# Patient Record
Sex: FEMALE | Race: WHITE | NOT HISPANIC OR LATINO | Employment: UNEMPLOYED | URBAN - METROPOLITAN AREA
[De-identification: names, ages, dates, MRNs, and addresses within clinical notes are randomized per-mention and may not be internally consistent; named-entity substitution may affect disease eponyms.]

---

## 2018-08-03 ENCOUNTER — HOSPITAL ENCOUNTER (EMERGENCY)
Facility: HOSPITAL | Age: 28
Discharge: HOME | End: 2018-08-03
Attending: EMERGENCY MEDICINE
Payer: COMMERCIAL

## 2018-08-03 VITALS
TEMPERATURE: 98 F | HEIGHT: 62 IN | WEIGHT: 123.44 LBS | BODY MASS INDEX: 22.71 KG/M2 | OXYGEN SATURATION: 99 % | SYSTOLIC BLOOD PRESSURE: 123 MMHG | RESPIRATION RATE: 18 BRPM | HEART RATE: 59 BPM | DIASTOLIC BLOOD PRESSURE: 56 MMHG

## 2018-08-03 DIAGNOSIS — O20.0 THREATENED ABORTION: ICD-10-CM

## 2018-08-03 DIAGNOSIS — O46.90 VAGINAL BLEEDING IN PREGNANCY, UNSPECIFIED TRIMESTER: Primary | ICD-10-CM

## 2018-08-03 DIAGNOSIS — N93.9 VAGINAL BLEEDING: ICD-10-CM

## 2018-08-03 LAB
ABO + RH BLD: NORMAL
ABO + RH BLD: NORMAL
ALBUMIN SERPL BCP-MCNC: 3.6 G/DL
ALP SERPL-CCNC: 45 U/L
ALT SERPL W/O P-5'-P-CCNC: 10 U/L
ANION GAP SERPL CALC-SCNC: 6 MMOL/L
AST SERPL-CCNC: 14 U/L
B-HCG UR QL: POSITIVE
BACTERIA GENITAL QL WET PREP: ABNORMAL
BASOPHILS # BLD AUTO: 0.03 K/UL
BASOPHILS NFR BLD: 0.3 %
BILIRUB SERPL-MCNC: 0.5 MG/DL
BILIRUB UR QL STRIP: NEGATIVE
BLD GP AB SCN CELLS X3 SERPL QL: NORMAL
BUN SERPL-MCNC: 3 MG/DL
CALCIUM SERPL-MCNC: 9 MG/DL
CHLORIDE SERPL-SCNC: 106 MMOL/L
CLARITY UR REFRACT.AUTO: CLEAR
CLUE CELLS VAG QL WET PREP: ABNORMAL
CO2 SERPL-SCNC: 23 MMOL/L
COLOR UR AUTO: YELLOW
CREAT SERPL-MCNC: 0.6 MG/DL
CTP QC/QA: YES
DIFFERENTIAL METHOD: ABNORMAL
EOSINOPHIL # BLD AUTO: 0.1 K/UL
EOSINOPHIL NFR BLD: 1.2 %
ERYTHROCYTE [DISTWIDTH] IN BLOOD BY AUTOMATED COUNT: 12.7 %
EST. GFR  (AFRICAN AMERICAN): >60 ML/MIN/1.73 M^2
EST. GFR  (NON AFRICAN AMERICAN): >60 ML/MIN/1.73 M^2
FILAMENT FUNGI VAG WET PREP-#/AREA: ABNORMAL
GLUCOSE SERPL-MCNC: 70 MG/DL
GLUCOSE UR QL STRIP: NEGATIVE
HCG INTACT+B SERPL-ACNC: NORMAL MIU/ML
HCT VFR BLD AUTO: 38 %
HGB BLD-MCNC: 13 G/DL
HGB UR QL STRIP: ABNORMAL
IMM GRANULOCYTES # BLD AUTO: 0.03 K/UL
IMM GRANULOCYTES NFR BLD AUTO: 0.3 %
INJECT RH IG VOL PATIENT: NORMAL ML
KETONES UR QL STRIP: NEGATIVE
LEUKOCYTE ESTERASE UR QL STRIP: NEGATIVE
LYMPHOCYTES # BLD AUTO: 2.3 K/UL
LYMPHOCYTES NFR BLD: 25.5 %
MCH RBC QN AUTO: 31.2 PG
MCHC RBC AUTO-ENTMCNC: 34.2 G/DL
MCV RBC AUTO: 91 FL
MICROSCOPIC COMMENT: NORMAL
MONOCYTES # BLD AUTO: 0.7 K/UL
MONOCYTES NFR BLD: 7.2 %
NEUTROPHILS # BLD AUTO: 6 K/UL
NEUTROPHILS NFR BLD: 65.5 %
NITRITE UR QL STRIP: NEGATIVE
NRBC BLD-RTO: 0 /100 WBC
PH UR STRIP: 7 [PH] (ref 5–8)
PLATELET # BLD AUTO: 264 K/UL
PMV BLD AUTO: 10.9 FL
POTASSIUM SERPL-SCNC: 3.6 MMOL/L
PROT SERPL-MCNC: 6.8 G/DL
PROT UR QL STRIP: NEGATIVE
RBC # BLD AUTO: 4.17 M/UL
RBC #/AREA URNS AUTO: 4 /HPF (ref 0–4)
RH BLD: NORMAL
SODIUM SERPL-SCNC: 135 MMOL/L
SP GR UR STRIP: 1.01 (ref 1–1.03)
SPECIMEN SOURCE: ABNORMAL
SQUAMOUS #/AREA URNS AUTO: 2 /HPF
T VAGINALIS GENITAL QL WET PREP: ABNORMAL
URN SPEC COLLECT METH UR: ABNORMAL
UROBILINOGEN UR STRIP-ACNC: NEGATIVE EU/DL
WBC # BLD AUTO: 9.17 K/UL
WBC #/AREA URNS AUTO: 1 /HPF (ref 0–5)
WBC #/AREA VAG WET PREP: ABNORMAL
YEAST GENITAL QL WET PREP: ABNORMAL

## 2018-08-03 PROCEDURE — 81025 URINE PREGNANCY TEST: CPT | Performed by: PHYSICIAN ASSISTANT

## 2018-08-03 PROCEDURE — 80053 COMPREHEN METABOLIC PANEL: CPT

## 2018-08-03 PROCEDURE — 86901 BLOOD TYPING SEROLOGIC RH(D): CPT | Mod: 91

## 2018-08-03 PROCEDURE — 87491 CHLMYD TRACH DNA AMP PROBE: CPT

## 2018-08-03 PROCEDURE — 84702 CHORIONIC GONADOTROPIN TEST: CPT

## 2018-08-03 PROCEDURE — 85025 COMPLETE CBC W/AUTO DIFF WBC: CPT

## 2018-08-03 PROCEDURE — 99284 EMERGENCY DEPT VISIT MOD MDM: CPT | Mod: ,,, | Performed by: PHYSICIAN ASSISTANT

## 2018-08-03 PROCEDURE — 86850 RBC ANTIBODY SCREEN: CPT

## 2018-08-03 PROCEDURE — 86900 BLOOD TYPING SEROLOGIC ABO: CPT | Mod: 91

## 2018-08-03 PROCEDURE — 81001 URINALYSIS AUTO W/SCOPE: CPT

## 2018-08-03 PROCEDURE — 87210 SMEAR WET MOUNT SALINE/INK: CPT

## 2018-08-03 PROCEDURE — 99284 EMERGENCY DEPT VISIT MOD MDM: CPT

## 2018-08-03 PROCEDURE — 63600519 RHOGAM PHARM REV CODE 636 ALT 250 W HCPCS: Performed by: PHYSICIAN ASSISTANT

## 2018-08-03 PROCEDURE — 25000003 PHARM REV CODE 250: Performed by: PHYSICIAN ASSISTANT

## 2018-08-03 RX ADMIN — SODIUM CHLORIDE 1000 ML: 0.9 INJECTION, SOLUTION INTRAVENOUS at 05:08

## 2018-08-03 RX ADMIN — HUMAN RHO(D) IMMUNE GLOBULIN 300 MCG: 300 INJECTION, SOLUTION INTRAMUSCULAR at 10:08

## 2018-08-03 NOTE — ED NOTES
LOC: The patient is awake and alert; oriented x 3 and speaking appropriately.  APPEARANCE: Patient resting comfortably, patient is clean and well groomed  SKIN: warm and dry, normal skin turgor & moist mucus membranes, skin intact, no breakdown noted.  MUSCULOSKELETAL: Patient moving all extremities well, no obvious swelling or deformities noted  RESPIRATORY: Airway is open and patent; respirations are spontaneous, normal effort and rate  CARDIAC: Patient has a normal rate, no peripheral edema noted, capillary refill < 3 seconds; No complaints of chest pain   ABDOMEN: Soft and C/O lower abd cramping, no distention noted. Bowel sounds present x 4

## 2018-08-03 NOTE — ED TRIAGE NOTES
Vaginal bleeding that began 3 days ago. Denies blood clots . C/o  Lower abdominal cramping.  Denies dysuria.  No fever. States she is 12 wks pregnant per US.

## 2018-08-03 NOTE — ED PROVIDER NOTES
Encounter Date: 8/3/2018       History     Chief Complaint   Patient presents with    Vaginal Bleeding     12 weeks pregnant, 2 days ago mod to heavy, today spotting     Patient is a 28-year-old   female presenting to the ER for evaluation of vaginal bleeding.  Patient is here with  who states that patient has had vaginal bleeding for the last 3 days.  She was changing about 2 pads per day.  It has improved in the last day.  Patient has had some abdominal cramping associated with this.  Denies flank pain.  Denies nausea, vomiting, diarrhea.  No blood in stool or black tarry stool.  She denies dysuria hematuria.  Patient does not have established care in the [place] and they moved here 10 days ago.  Last ultrasound was at 7 weeks pregnant [place].      The history is provided by the patient and the spouse. The history is limited by a language barrier. No  was used.     Review of patient's allergies indicates:  No Known Allergies  Past Medical History:   Diagnosis Date    Anemia      History reviewed. No pertinent surgical history.  History reviewed. No pertinent family history.  Social History   Substance Use Topics    Smoking status: Never Smoker    Smokeless tobacco: Never Used    Alcohol use No     Review of Systems   Constitutional: Negative for chills and fever.   HENT: Negative for congestion.    Respiratory: Negative for cough and shortness of breath.    Cardiovascular: Negative for chest pain and palpitations.   Gastrointestinal: Positive for abdominal pain. Negative for diarrhea, nausea and vomiting.   Genitourinary: Positive for vaginal bleeding. Negative for dysuria, flank pain and hematuria.   Musculoskeletal: Negative for back pain.   Skin: Negative for rash.   Allergic/Immunologic: Negative for immunocompromised state.   Neurological: Negative for dizziness and weakness.   Hematological: Does not bruise/bleed easily.   Psychiatric/Behavioral:  Negative for confusion.       Physical Exam     Initial Vitals [08/03/18 1517]   BP Pulse Resp Temp SpO2   (!) 112/59 64 18 98.4 °F (36.9 °C) 100 %      MAP       --         Physical Exam    Constitutional: She appears well-developed and well-nourished. She is not diaphoretic. No distress.   HENT:   Head: Normocephalic and atraumatic.   Eyes: Conjunctivae and EOM are normal.   Neck: Neck supple.   Cardiovascular: Normal rate, regular rhythm, normal heart sounds and intact distal pulses.   Pulmonary/Chest: Breath sounds normal.   Abdominal: Soft. Normal appearance. She exhibits no distension. There is no tenderness. There is no rigidity, no rebound, no guarding and no CVA tenderness.   Genitourinary: Cervix exhibits no motion tenderness. Right adnexum displays no mass, no tenderness and no fullness. Left adnexum displays no mass, no tenderness and no fullness. There is bleeding in the vagina.   Genitourinary Comments: Minimal vaginal bleeding, brownish in color.    Neurological: She is alert and oriented to person, place, and time.   Skin: Skin is warm and dry.         ED Course   Procedures  Labs Reviewed   CBC W/ AUTO DIFFERENTIAL - Abnormal; Notable for the following:        Result Value    MCH 31.2 (*)     All other components within normal limits   COMPREHENSIVE METABOLIC PANEL - Abnormal; Notable for the following:     Sodium 135 (*)     BUN, Bld 3 (*)     Alkaline Phosphatase 45 (*)     Anion Gap 6 (*)     All other components within normal limits   URINALYSIS, REFLEX TO URINE CULTURE - Abnormal; Notable for the following:     Occult Blood UA 1+ (*)     All other components within normal limits    Narrative:     Preferred Collection Type->Urine, Clean Catch   VAGINAL SCREEN - Abnormal; Notable for the following:     Bacteria - Vaginal Screen Few (*)     All other components within normal limits   POCT URINE PREGNANCY - Abnormal; Notable for the following:     POC Preg Test, Ur Positive (*)     All other  components within normal limits   C. TRACHOMATIS/N. GONORRHOEAE BY AMP DNA   HCG, QUANTITATIVE, PREGNANCY   URINALYSIS MICROSCOPIC    Narrative:     Preferred Collection Type->Urine, Clean Catch   GROUP & RH   RH TYPING   PREPARE RH IMMUNE GLOBULIN          Imaging Results    None                APC / Resident Notes:   Patient was seen in the ED promptly upon arrival.  She is afebrile, no acute distress.  Physical examination does reveal minimal vaginal bleeding, pinkish brown in the vaginal vault.  No blood clots noted. No CMT or adnexal tenderness on exam. Cervical os is closed.   Wet prep unremarkable.  Laboratory studies unremarkable. Beta HCG [id number].    Bedside ultrasound was preformed by Dr. Vergara. IUP noted. Fetal movement and heart rate appears within normal limits.     Patient is O negative. She did receive 1 dose of RhoGAM in ED.  I did provide patient with follow-up information for OB gyn.  Advised to call on Monday.  Advised to avoid NSAIDs; only take Tylenol if needed.  Given strict return precautions to the ED.  Stable for discharge. The care of this patient was overseen by attending physician who agrees with treatment, plan, and disposition.                   Clinical Impression:   The primary encounter diagnosis was Vaginal bleeding in pregnancy, unspecified trimester. Diagnoses of Vaginal bleeding and Threatened  were also pertinent to this visit.      Disposition:   Disposition: Discharged  Condition: Stable                        Sasha Herrera PA-C  18

## 2018-08-04 LAB
C TRACH DNA SPEC QL NAA+PROBE: NOT DETECTED
N GONORRHOEA DNA SPEC QL NAA+PROBE: NOT DETECTED

## 2018-08-09 ENCOUNTER — LAB VISIT (OUTPATIENT)
Dept: LAB | Facility: HOSPITAL | Age: 28
End: 2018-08-09
Attending: OBSTETRICS & GYNECOLOGY
Payer: COMMERCIAL

## 2018-08-09 ENCOUNTER — TELEPHONE (OUTPATIENT)
Dept: OBSTETRICS AND GYNECOLOGY | Facility: CLINIC | Age: 28
End: 2018-08-09

## 2018-08-09 ENCOUNTER — INITIAL PRENATAL (OUTPATIENT)
Dept: OBSTETRICS AND GYNECOLOGY | Facility: CLINIC | Age: 28
End: 2018-08-09
Payer: COMMERCIAL

## 2018-08-09 VITALS — BODY MASS INDEX: 21.77 KG/M2 | WEIGHT: 118.25 LBS | SYSTOLIC BLOOD PRESSURE: 98 MMHG | DIASTOLIC BLOOD PRESSURE: 58 MMHG

## 2018-08-09 DIAGNOSIS — Z34.01 ENCOUNTER FOR SUPERVISION OF NORMAL FIRST PREGNANCY IN FIRST TRIMESTER: ICD-10-CM

## 2018-08-09 DIAGNOSIS — Z84.3 CONSANGUINITY: Primary | ICD-10-CM

## 2018-08-09 PROBLEM — Z34.00 PREGNANCY, SUPERVISION, NORMAL, FIRST: Status: ACTIVE | Noted: 2018-08-09

## 2018-08-09 PROCEDURE — 0500F INITIAL PRENATAL CARE VISIT: CPT | Mod: S$GLB,,, | Performed by: OBSTETRICS & GYNECOLOGY

## 2018-08-09 PROCEDURE — 87086 URINE CULTURE/COLONY COUNT: CPT

## 2018-08-09 PROCEDURE — 87491 CHLMYD TRACH DNA AMP PROBE: CPT

## 2018-08-09 PROCEDURE — 88175 CYTOPATH C/V AUTO FLUID REDO: CPT

## 2018-08-09 NOTE — TELEPHONE ENCOUNTER
----- Message from Trini Srivastava sent at 8/9/2018  3:36 PM CDT -----  Contact: diane  Name of Who is Calling: [facility]      What is the request in detail: Diane is requesting a call back to discuss this medication PNV,calcium 72-iron-folic acid 27 mg iron- 1 mg Tab      Can the clinic reply by [facility]: no      What Number to Call Back if not in [facility]: [phone]

## 2018-08-09 NOTE — TELEPHONE ENCOUNTER
Called pharmacy and informed them that any prenatal vitamin is fine as long as it has folic acid and dha.

## 2018-08-09 NOTE — PROGRESS NOTES
New OB    SUBJECTIVE:     Chief Complaint: Initial Prenatal Visit       History of Present Illness:  Pt is a 28 y.o. female who presents with her first pregnancy.  Dates unknown.  She does not vaginal bleeding/spotting, does have slight abdominal cramping, but no abdominal pain,  does not have nausea, does not have vomiting.     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 No pertinent surgical history.  OB History      Para Term  AB Living    1              SAB TAB Ectopic Multiple Live Births                     History reviewed. No pertinent family history.  Social History   Substance Use Topics    Smoking status: Never Smoker    Smokeless tobacco: Never Used    Alcohol use No       Current Outpatient Prescriptions   Medication Sig    FOLIC ACID, BULK, MISC by Misc.(Non-Drug; Combo Route) route.    PNV,calcium 72-iron-folic acid 27 mg iron- 1 mg Tab Take 1 tablet (1 each total) by mouth once daily.     No current facility-administered medications for this visit.        Review of Systems:  Review of Systems   Constitutional: Negative for fever and unexpected weight change.   Respiratory: Negative for shortness of breath.    Cardiovascular: Negative for chest pain.   Gastrointestinal: Negative for constipation, diarrhea, nausea and vomiting.   Genitourinary: Positive for pelvic pain. Negative for dysuria, frequency, menstrual problem, urgency, vaginal bleeding, vaginal discharge and vaginal pain.   Psychiatric/Behavioral: The patient is not nervous/anxious.         OBJECTIVE:     Physical Exam:  Physical Exam   Constitutional: She is oriented to person, place, and time. She appears well-developed and well-nourished.   Neck: Normal range of motion. Neck supple. No tracheal deviation  present. No thyromegaly present.   Cardiovascular: Normal rate, regular rhythm and normal heart sounds.    Pulmonary/Chest: Effort normal and breath sounds normal. Right breast exhibits no inverted nipple, no mass, no nipple discharge, no skin change and no tenderness. Left breast exhibits no inverted nipple, no mass, no nipple discharge, no skin change and no tenderness. Breasts are symmetrical.   Abdominal: Soft.   Genitourinary: Vagina normal and uterus normal. No labial fusion. There is no rash, tenderness, lesion or injury on the right labia. There is no rash, tenderness, lesion or injury on the left labia. Uterus is not deviated, not enlarged, not fixed and not tender. Cervix exhibits no motion tenderness, no discharge and no friability. Right adnexum displays no mass, no tenderness and no fullness. Left adnexum displays no mass, no tenderness and no fullness. No erythema, tenderness or bleeding in the vagina. No foreign body in the vagina. No signs of injury around the vagina. No vaginal discharge found.   Genitourinary Comments: Urethra: normal appearing urethra with no masses, tenderness or lesions  Urethral meatus: normal size, anterior vaginal wall with no prolapse, no lesions   Neurological: She is alert and oriented to person, place, and time.   Psychiatric: She has a normal mood and affect. Her behavior is normal. Judgment and thought content normal.   Nursing note and vitals reviewed.      Chaperoned by: Lyudmila    ASSESSMENT:       ICD-10-CM ICD-9-CM    1. 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 Encounter for supervision of normal first pregnancy in first trimester Z34.01 V22.0 Hemoglobin Electrophoresis,Hgb A2 Teja.      HIV-1 and HIV-2 antibodies      RPR      Hepatitis B surface antigen      Rubella antibody, IgG      Urine culture      C. trachomatis/N. gonorrhoeae by AMP DNA      Liquid-based pap smear, screening      US MFM Procedure ([facility])       PNV,calcium 72-iron-folic acid 27 mg iron- 1 mg Tab          Plan:      Ximena was seen today for initial prenatal visit.    Diagnoses and all orders for this visit:    Consanguinity  -     Ambulatory consult to Maternal Fetal Medicine    Encounter for supervision of normal first pregnancy in first trimester  -     Hemoglobin Electrophoresis,Hgb A2 Teja.; Future  -     HIV-1 and HIV-2 antibodies; Future  -     RPR; Future  -     Hepatitis B surface antigen; Future  -     Rubella antibody, IgG; Future  -     Urine culture  -     C. trachomatis/N. gonorrhoeae by AMP DNA  -     Liquid-based pap smear, screening  -     US MFM Procedure ([facility]); Future  -     PNV,calcium 72-iron-folic acid 27 mg iron- 1 mg Tab; Take 1 tablet (1 each total) by mouth once daily.        Orders Placed This Encounter   Procedures    Urine culture    C. trachomatis/N. gonorrhoeae by AMP DNA    Hemoglobin Electrophoresis,Hgb A2 Teja.    HIV-1 and HIV-2 antibodies    RPR    Hepatitis B surface antigen    Rubella antibody, IgG    Ambulatory consult to Maternal Fetal Medicine    US MFM Procedure ([facility])       Positive UPT:   - UPT: positive in the clinic  - Dating U/S: ordered today- with MFM  - She does not want 1st trimester screening with MFM; but does want quad screen in the second trimester  - Recommended Prenatal vitamins with with at least 400 mcg of folic acid  - First trimester labs: ordered; declined CF  - referral to MFM for consanguineous relationship      Counseling: Patient was counseled today on routine 1st trimester precautions, including vaginal bleeding and abdominal pain. We also discussed proper weight gain based on the [facility] [employer] [facility]'s recommendations based on her pre-pregnancy weight, foods to avoid in pregnancy (i.e. sushi, fish that are high in mercury, cold deli meat, and unpasteurized cheeses), environmental precautions such as cat litter, and prenatal vitamin options (i.e. stool softener, DHA).   Patient given A to Z handbook of pregnancy.    Follow-up in about 1 month (around 9/9/2018) for ob check.    Diana Rey

## 2018-08-10 ENCOUNTER — APPOINTMENT (OUTPATIENT)
Dept: LAB | Facility: HOSPITAL | Age: 28
End: 2018-08-10
Attending: OBSTETRICS & GYNECOLOGY
Payer: COMMERCIAL

## 2018-08-10 LAB
C TRACH DNA SPEC QL NAA+PROBE: NOT DETECTED
HBV SURFACE AG SERPL QL IA: NEGATIVE
HIV 1+2 AB+HIV1 P24 AG SERPL QL IA: NEGATIVE
N GONORRHOEA DNA SPEC QL NAA+PROBE: NOT DETECTED

## 2018-08-10 PROCEDURE — 86592 SYPHILIS TEST NON-TREP QUAL: CPT

## 2018-08-10 PROCEDURE — 83021 HEMOGLOBIN CHROMOTOGRAPHY: CPT

## 2018-08-10 PROCEDURE — 36415 COLL VENOUS BLD VENIPUNCTURE: CPT | Mod: PO

## 2018-08-10 PROCEDURE — 87340 HEPATITIS B SURFACE AG IA: CPT

## 2018-08-10 PROCEDURE — 86762 RUBELLA ANTIBODY: CPT

## 2018-08-10 PROCEDURE — 86703 HIV-1/HIV-2 1 RESULT ANTBDY: CPT

## 2018-08-11 LAB — RPR SER QL: NORMAL

## 2018-08-12 LAB
BACTERIA UR CULT: NORMAL
BACTERIA UR CULT: NORMAL

## 2018-08-13 LAB
RUBV IGG SER-ACNC: 57.6 IU/ML
RUBV IGG SER-IMP: REACTIVE

## 2018-08-14 LAB
HGB A2 MFR BLD HPLC: 2.8 %
HGB FRACT BLD ELPH-IMP: NORMAL
HGB FRACT BLD ELPH-IMP: NORMAL

## 2018-08-21 ENCOUNTER — INITIAL CONSULT (OUTPATIENT)
Dept: MATERNAL FETAL MEDICINE | Facility: CLINIC | Age: 28
End: 2018-08-21
Payer: COMMERCIAL

## 2018-08-21 ENCOUNTER — OFFICE VISIT (OUTPATIENT)
Dept: MATERNAL FETAL MEDICINE | Facility: CLINIC | Age: 28
End: 2018-08-21
Payer: COMMERCIAL

## 2018-08-21 VITALS — DIASTOLIC BLOOD PRESSURE: 68 MMHG | BODY MASS INDEX: 20.85 KG/M2 | WEIGHT: 113.31 LBS | SYSTOLIC BLOOD PRESSURE: 98 MMHG

## 2018-08-21 DIAGNOSIS — Z84.3 CONSANGUINITY: ICD-10-CM

## 2018-08-21 DIAGNOSIS — Z34.01 ENCOUNTER FOR SUPERVISION OF NORMAL FIRST PREGNANCY IN FIRST TRIMESTER: ICD-10-CM

## 2018-08-21 DIAGNOSIS — O35.9XX0 FETAL ABNORMALITY AFFECTING MANAGEMENT OF MOTHER, SINGLE OR UNSPECIFIED FETUS: ICD-10-CM

## 2018-08-21 DIAGNOSIS — Z36.89 ENCOUNTER FOR FETAL ANATOMIC SURVEY: Primary | ICD-10-CM

## 2018-08-21 PROCEDURE — 99999 PR PBB SHADOW E&M-EST. PATIENT-LVL II: CPT | Mod: PBBFAC,,, | Performed by: OBSTETRICS & GYNECOLOGY

## 2018-08-21 PROCEDURE — 76811 OB US DETAILED SNGL FETUS: CPT | Mod: S$GLB,,, | Performed by: OBSTETRICS & GYNECOLOGY

## 2018-08-21 PROCEDURE — 99205 OFFICE O/P NEW HI 60 MIN: CPT | Mod: 25,S$GLB,, | Performed by: OBSTETRICS & GYNECOLOGY

## 2018-08-21 PROCEDURE — 3008F BODY MASS INDEX DOCD: CPT | Mod: CPTII,S$GLB,, | Performed by: OBSTETRICS & GYNECOLOGY

## 2018-08-21 NOTE — PROGRESS NOTES
"OB Ultrasound Report (see PDF version under imaging tab) and consultation    Indication  ========    Consultation- consanguinity . Estimation of gestational age.    Method  ======    Transabdominal ultrasound examination, [place] E10. View: Sufficient.    Pregnancy  =========    [place] pregnancy. Number of fetuses: 1.    Dating  ======    GA by "stated dating" 15 w + 3 d  YASMIN by "stated dating": 2/9/2019  Ultrasound examination on: 8/21/2018  GA by U/S based upon: AC, BPD, Femur, HC  GA by U/S 16 w + 2 d  YASMIN by U/S: 2/3/2019  Assigned: Dating performed on 08/21/2018, based on the external assessment  Assigned GA 15 w + 3 d  Assigned YASMIN: 2/9/2019    General Evaluation  ===============    Cardiac activity: present.  bpm.  Fetal movements: visualized.  Placenta:  Placental site: right lateral.  Umbilical cord: 2 vessel cord.  Amniotic fluid: normal amount.    Fetal Biometry  ===========    Fetal Biometry  BPD 34.7 mm 16w 5d Hadlock  OFD 42.1 mm 16w 4d Dhara  .8 mm 16w 1d Hadlock  .7 mm 16w 4d Hadlock  Femur 18.9 mm 15w 4d Hadlock   g  Calculated by: Hadlock (BPD-HC-AC-FL)  EFW (lb) 0 lb  EFW (oz) 5 oz  Cephalic index 0.82  HC / AC 1.15  FL / BPD 0.54  FL / AC 0.18   bpm    Fetal Anatomy  ===========    Cranium: appears normal  Lateral ventricles: details  Choroid plexus: normal  Midline falx: normal  Cerebellum: visualized  Cisterna magna: normal  Rt lateral ventricle: ventriculomegaly  Lt lateral ventricle: ventriculomegaly  Third ventricle: dilated  Stomach: visualized  Bladder: visualized  Arms: both visualized  Legs: both visualized    Maternal Structures  ===============    Ovaries / Tubes / Adnexa  Rt ovary: Visualized  Rt ovary D1 4.0 cm  Rt ovary D2 3.3 cm  Rt ovary D3 2.1 cm  Rt ovary mean 3.1 cm  Rt ovary vol 14.0 cm³  Lt ovary: Not visualized    Consultation  ==========    Type: Abnormal Ultrasound Finding and Parental consanguinity.  I spent 60 minutes on the " consultation today with the patient and her spouse regarding their family histories and regarding findings  from today's ultrasound exam. More than 50% of the consultation was spent in face-to-face counseling and coordination of care.    The patient is a 29 y/o G1 who is currently 15 and 3/7 weeks pregnant based on a prior scan performed in [facility] (YASMIN of  2/9/19). She presents with her  today due to concerns regarding consanguinity. Ximena and her  are first cousins (his  mother and her father are siblings). They report 4 relatives with congenital heart disease (CHD). One child (Wendi brothers son, who  is the product of a consanguineous mating also) has a congenital pulmonary valve problem that has been corrected by one surgical  procedure. He is 6 years old and reportedly has no other congenital birth defects or developmental delay. The other 3 individuals with  CHD are the offspring of 2 of Wendi fathers brothers. One brother had two daughters affected by CHD, and another brother has one  son affected by CHD. The specific types of CHD for these individuals is unknown. These individuals are otherwise reportedly normal.  To the best of their knowledge, no other individuals in the families are affected by congenital birth defects or known genetic disease.  Both Ximena and her  are in good health and deny any chronic medical illnesses or medication use. Other than 2 episodes of  vaginal bleeding, which have resolved, Wendi pregnancy has been uncomplicated thus far.  We reviewed the concerns associated with consanguinity in general and discussed the particular risks based on the known family  history of CHD. The primary genetic concerns include an increased risk for autosomal recessive disorders, due to the increased  number of genes shared in consanguineous couples, and an increased risk (approximately 2X increased over the general population  risk) for congenital anomalies or  developmental delay. Ximena and her  have been screened for thalassemia (uncertain whether  beta thalassemia, alpha thalassemia, or both) prior to their marriage, and neither was found to be a carrier. They have not undergone  any other screening for genetic disease. We reviewed the option of expanded carrier screening. At this point, they are not interested  in pursuing expanded carrier screening. Based on the family history of CHD, they are at increased risk to have a child with CHD, and  fetal echo and consultation with Pediatric Cardiology is recommended.  On todays ultrasound examination, while limited due to early gestation, bilateral cerebral lateral ventriculomegaly and dilation of the  third ventricle is suspected. Therefore, we reviewed the association between this finding and fetal chromosomal/genetic abnormalities  and fetal congenital infection. Amniocentesis, as the means of making a definitive cytogenetic diagnosis and as a means of testing for  some congenital infections, was discussed. At this point, Ximena and her  are deferring a decision about amniocentesis. Will  plan for a f/u ultrasound exam in 3 weeks to reassess the cerebral ventricles and to better delineate the rest of the fetal anatomy. At  that time, will readdress amniocentesis and referral to Pediatric Cardiology for a fetal echo.    Impression  =========    Fetal size is appropriate for established gestational age. The fetal anatomic survey is limited due to early gestational age. However,  lateral cerebral ventriculomegaly and dilation of the third ventricle is suspected. The remainder of the intracranial anatomy is grossly  normal. No other fetal structural malformations are seen, but some of the fetal anatomy cannot be adequately assessed today.  AFV is normal.

## 2018-08-21 NOTE — LETTER
August 21, 2018      Diana Rey MD  [address] [address]           [facility]  [address]  [facility] [address]  Phone: [phone]          Patient: Ximena Robbins   MR Number: [id number]   YOB: 1990   Date of Visit: 8/21/2018       Dear Dr. Diana Rey:    Thank you for referring Ximena Robbins to me for evaluation. Attached you will find relevant portions of my assessment and plan of care.    If you have questions, please do not hesitate to call me. I look forward to following Ximena Robbins along with you.    Sincerely,    Peri Watson MD    Enclosure  CC:  No Recipients    If you would like to receive this communication electronically, please contact [email or web address] or [phone] to request more information on [employer] access.    For providers and/or their staff who would like to refer a patient to [facility], please contact us through our one-stop-shop provider referral line, [facility], at [phone].    If you feel you have received this communication in error or would no longer like to receive these types of communications, please e-mail [email or web address]

## 2018-09-04 ENCOUNTER — PATIENT MESSAGE (OUTPATIENT)
Dept: OBSTETRICS AND GYNECOLOGY | Facility: CLINIC | Age: 28
End: 2018-09-04

## 2018-09-12 ENCOUNTER — PROCEDURE VISIT (OUTPATIENT)
Dept: MATERNAL FETAL MEDICINE | Facility: CLINIC | Age: 28
End: 2018-09-12
Payer: COMMERCIAL

## 2018-09-12 ENCOUNTER — INITIAL CONSULT (OUTPATIENT)
Dept: MATERNAL FETAL MEDICINE | Facility: CLINIC | Age: 28
End: 2018-09-12
Payer: COMMERCIAL

## 2018-09-12 VITALS — WEIGHT: 116.19 LBS | SYSTOLIC BLOOD PRESSURE: 90 MMHG | BODY MASS INDEX: 21.38 KG/M2 | DIASTOLIC BLOOD PRESSURE: 54 MMHG

## 2018-09-12 DIAGNOSIS — O35.00X0 CENTRAL NERVOUS SYSTEM MALFORMATION IN FETUS AFFECTING OBSTETRICAL CARE, SINGLE OR UNSPECIFIED FETUS: ICD-10-CM

## 2018-09-12 DIAGNOSIS — O35.9XX0 FETAL ABNORMALITY AFFECTING MANAGEMENT OF MOTHER, SINGLE OR UNSPECIFIED FETUS: ICD-10-CM

## 2018-09-12 DIAGNOSIS — Z84.3 CONSANGUINITY: ICD-10-CM

## 2018-09-12 DIAGNOSIS — Z36.89 ENCOUNTER FOR FETAL ANATOMIC SURVEY: ICD-10-CM

## 2018-09-12 DIAGNOSIS — O35.9XX0 FETAL ABNORMALITY AFFECTING MANAGEMENT OF MOTHER, SINGLE OR UNSPECIFIED FETUS: Primary | ICD-10-CM

## 2018-09-12 DIAGNOSIS — O35.BXX0 PREGNANCY COMPLICATED BY FETAL CONGENITAL HEART DISEASE, SINGLE OR UNSPECIFIED FETUS: ICD-10-CM

## 2018-09-12 PROCEDURE — 99215 OFFICE O/P EST HI 40 MIN: CPT | Mod: 25,S$GLB,, | Performed by: OBSTETRICS & GYNECOLOGY

## 2018-09-12 PROCEDURE — 76811 OB US DETAILED SNGL FETUS: CPT | Mod: S$GLB,,, | Performed by: OBSTETRICS & GYNECOLOGY

## 2018-09-12 PROCEDURE — 3008F BODY MASS INDEX DOCD: CPT | Mod: CPTII,S$GLB,, | Performed by: OBSTETRICS & GYNECOLOGY

## 2018-09-12 PROCEDURE — 99999 PR PBB SHADOW E&M-EST. PATIENT-LVL II: CPT | Mod: PBBFAC,,, | Performed by: OBSTETRICS & GYNECOLOGY

## 2018-09-13 ENCOUNTER — TELEPHONE (OUTPATIENT)
Dept: MATERNAL FETAL MEDICINE | Facility: CLINIC | Age: 28
End: 2018-09-13

## 2018-09-13 NOTE — PROGRESS NOTES
"OB Ultrasound Report (see PDF version under imaging tab) and f/u consultation    Indication  ========    Fetal anatomy survey - dilated lateral ventricles and third ventricle seen on last exam.    Method  ======    [place] E10, Transabdominal ultrasound examination. View: Suboptimal view: limited by fetal position.    Pregnancy  =========    [place] pregnancy. Number of fetuses: 1.    Dating  ======    Cycle: regular cycle  GA by "stated dating" 18 w + 4 d  YASMIN by "stated dating": 2/9/2019  Ultrasound examination on: 9/12/2018  GA by U/S based upon: AC, BPD, Femur, HC  GA by U/S 18 w + 6 d  YASMIN by U/S: 2/7/2019  Assigned: Dating performed on 08/21/2018, based on the external assessment  Assigned GA 18 w + 4 d  Assigned YASMIN: 2/9/2019    General Evaluation  ===============    Cardiac activity: present.  bpm.  Fetal movements: visualized.  Presentation: transverse.  Placenta:  Placental site: anterior.  Umbilical cord: Cord vessels: 2 vessel cord. Cord insertion: placental insertion: normal.  Amniotic fluid: normal amount.    Fetal Biometry  ===========    Fetal Biometry  BPD 43.6 mm 19w 1d Hadlock  OFD 56.0 mm 19w 5d Dhara  .4 mm 19w 0d Hadlock  .8 mm 18w 6d Hadlock  Femur 27.1 mm 18w 2d Hadlock  Cerebellum tr 18.2 mm 18w 4d Reed  CM 4.6 mm  Nuchal fold 4.79 mm  Humerus 28.4 mm 19w 1d Dhara   g  Calculated by: Hadlock (BPD-HC-AC-FL)  EFW (lb) 0 lb  EFW (oz) 9 oz  Cephalic index 0.78  HC / AC 1.21  FL / BPD 0.62  FL / AC 0.20   bpm    Fetal Anatomy  ===========    Cranium: normal  Lateral ventricles: abnormal  Choroid plexus: normal  Midline falx: normal  Cerebellum: normal  Cisterna magna: normal  Head shape: normal  Rt lateral ventricle: abnormal  Rt lateral ventricle: 11 - 12mm  Lt lateral ventricle: abnormal  Lt lateral ventricle: 11 - 12mm  Cavum septi pellucidi: visualized, small  Parenchyma: normal  Third ventricle: abnormal  Third ventricle: dilated - 3.4 " mm  Neck: appears normal  Nuchal fold: normal  Lips: normal  Profile: normal  Nose: normal  Maxilla: normal  Mandible: normal  RVOT: normal  LVOT: suboptimal  4-chamber view: normal 4 chamber with prominent/dilated coronary sinus  Situs: normal  Aortic arch: suboptimal  Ductal arch: suboptimal  SVC: suboptimal  IVC: suboptimal  3-vessel view: suboptimal  3-vessel - trachea view: suspected right aortic arch  Cardiac position: normal  Cardiac axis: normal  Cardiac size: normal  Cardiac rhythm: normal  Rt lung: normal  Lt lung: normal  Diaphragm: normal  Cord insertion: normal  Stomach: normal  Kidneys: normal  Bladder: normal  Genitals: normal  Abdom. wall: appears normal  Abdom. cavity: normal  Rt kidney: normal  Lt kidney: normal  Liver: normal  Bowel: normal  Rt renal artery: normal  Lt renal artery: normal  Cervical spine: normal  Thoracic spine: normal  Lumbar spine: normal  Sacral spine: normal  Arms: normal  Legs: normal  Rt arm: normal  Lt arm: normal  Rt hand: present, hand closed  Lt hand: present, hand closed  Rt leg: normal  Lt leg: normal  Rt foot: normal  Lt foot: normal  Position of hands: suboptimal  Position of feet: normal  Gender: male  Wants to know gender: yes    Maternal Structures  ===============    Uterus / Cervix  Uterus: Normal  Cervical length 34.4 mm  Ovaries / Tubes / Adnexa  Rt ovary: Visualized  Lt ovary: Not visualized  Other: Uterus and adnexa normal    Consultation  ==========    Type: Abnormal Ultrasound Finding.  I spent 60 minutes on the consultation today with the patient and her spouse regarding findings from today's ultrasound exam. More  than 50% of the consultation was spent in face-to-face counseling and coordination of care.  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 Possible causes of aqueductal stenosis, including X-linked  aqueductal stenosis, infection, hemorrhage, and other genetic disorders, were reviewed. Options for testing during  pregnancy,  including amniocentesis, serial ultrasound exams, fetal MRI, serum screening tests for common fetal aneuploidies, and maternal  serologies for congenital infection, were also reviewed. The limitations of all testing available prenatally in arriving at an etiology for the  CNS findings were also reviewed. Similarly, the inability to predict with certainty the presence and degree of neurologic and  neurodevelopmental abnormalities associated with the CNS findings was discussed. The concurrent finding of likely fetal  cardiovascular malformation and consanguinity of the couple makes a genetic etiology a more likely explanation for the aqueductal  stenosis. We reviewed the importance of serial ultrasound examinations to assess for progression of ventriculomegaly and the  importance of better delineation of fetal cardiac anatomy. We also reviewed options regarding pregnancy continuation/termination,  specifically with regard to legal limitations in terms of gestational age.  At the end of our discussion, the couple decided to proceed with a quad screen and maternal serologies for congenital infection. Will  send CMV and toxoplasmosis IgG/IgM. She has had rubella and syphilis testing recently and is rubella immune and negative for  syphilis.  At this point, they are uncertain regarding amniocentesis and continuation of pregnancy.    Impression  =========    The following abnormal findings are seen on today's exam:  1. Bilateral, symmetrical lateral cerebral ventriculomegaly (11 - 12 mm at the atrial level)  2. Dilation of the third ventricle (~ 3 mm)  3. Prominent coronary sinus  4. 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 Possible narrowing of the aortic outflow with malalignment VSD    The remainder of the visualized fetal anatomy is normal; however, not all structures can be adequately visualized due to fetal position  and early gestational age. The cerebellum and posterior fossa are normal in appearance, and the 4th  ventricle is not dilated. There is  no evidence of cerebral ventricular wall irregularity, intraventricular or intraparenchymal hemorrhage, intracranial calcification, or  mass lesion in the brain. Today's CNS findings are most consistent with a diagnosis of aqueductal stenosis. Because the fetus is male,  X-linked aqueductal stenosis is in the differential diagnosis. Also in the differential diagnosis is aqueductal stenosis secondary to  infection, hemorrhage, or mass lesion. The fetal thumbs do not appear to be persistently adducted. While somewhat reassuring in  terms of concern for X-linked aqueductal stenosis, only ~50% of fetuses with aqueductal stenosis will have adducted thumbs.  The cardiac findings are concerning for an aortic arch abnormality and possible LVOT narrowing and malalignment VSD.

## 2018-09-13 NOTE — TELEPHONE ENCOUNTER
Left message for patient and  that orders for blood work labs have been entered and the patient can get them drawn at her earliest convenience.

## 2018-09-18 ENCOUNTER — LAB VISIT (OUTPATIENT)
Dept: LAB | Facility: OTHER | Age: 28
End: 2018-09-18
Payer: COMMERCIAL

## 2018-09-18 DIAGNOSIS — O35.9XX0 FETAL ABNORMALITY AFFECTING MANAGEMENT OF MOTHER, SINGLE OR UNSPECIFIED FETUS: ICD-10-CM

## 2018-09-18 PROCEDURE — 81511 FTL CGEN ABNOR FOUR ANAL: CPT

## 2018-09-18 PROCEDURE — 86644 CMV ANTIBODY: CPT

## 2018-09-18 PROCEDURE — 86645 CMV ANTIBODY IGM: CPT

## 2018-09-18 PROCEDURE — 86777 TOXOPLASMA ANTIBODY: CPT

## 2018-09-18 PROCEDURE — 86778 TOXOPLASMA ANTIBODY IGM: CPT

## 2018-09-20 LAB
# FETUSES US: ABNORMAL
2ND TRIMESTER 4 SCREEN PNL SERPL: POSITIVE
2ND TRIMESTER 4 SCREEN SERPL-IMP: ABNORMAL
AFP MOM SERPL: 2.27
AFP SERPL-MCNC: 129.2 NG/ML
AGE AT DELIVERY: 28
B-HCG MOM SERPL: 1.24
B-HCG SERPL-ACNC: 30.7 IU/ML
CMV IGM TITR SERPL: <8 U/ML
FET TS 21 RISK FROM MAT AGE: ABNORMAL
GA (DAYS): 3 D
GA (WEEKS): 19 WK
GA METHOD: ABNORMAL
IDDM PATIENT QL: ABNORMAL
INHIBIN A MOM SERPL: 1.56
INHIBIN A SERPL-MCNC: 333.8 PG/ML
SMOKING STATUS FTND: NO
T GONDII IGM SER-ACNC: <3 AU/ML
TS 18 RISK FETUS: ABNORMAL
TS 21 RISK FETUS: ABNORMAL
U ESTRIOL MOM SERPL: 0.99
U ESTRIOL SERPL-MCNC: 1.79 NG/ML

## 2018-09-24 ENCOUNTER — PATIENT MESSAGE (OUTPATIENT)
Dept: MATERNAL FETAL MEDICINE | Facility: CLINIC | Age: 28
End: 2018-09-24

## 2018-09-25 LAB — CMV IGG SERPL QL IA: REACTIVE

## 2018-09-26 LAB
T GONDII IGG SER QL IA: NORMAL
T GONDII IGG SERPL IA-ACNC: <5 IU/ML

## 2018-09-27 ENCOUNTER — TELEPHONE (OUTPATIENT)
Dept: MATERNAL FETAL MEDICINE | Facility: CLINIC | Age: 28
End: 2018-09-27

## 2018-09-27 NOTE — TELEPHONE ENCOUNTER
Pt  notified that MRI has been scheduled per Dr Watson on Wednesday, October 3 at 4:30 pm. Pt's  verbalized understanding of information. Pt portal message sent to the pt as well.

## 2018-09-28 ENCOUNTER — TELEPHONE (OUTPATIENT)
Dept: MATERNAL FETAL MEDICINE | Facility: CLINIC | Age: 28
End: 2018-09-28

## 2018-09-28 ENCOUNTER — TELEPHONE (OUTPATIENT)
Dept: OBSTETRICS AND GYNECOLOGY | Facility: CLINIC | Age: 28
End: 2018-09-28

## 2018-09-28 DIAGNOSIS — Z01.89 ENCOUNTER FOR LABORATORY TEST: Primary | ICD-10-CM

## 2018-09-28 NOTE — TELEPHONE ENCOUNTER
"Pt's  notified that Dr Watson would like to order one more lab test --CMV IgG avidity. Informed that per Dr Watson's message " her CMV test results likely represent a distant past infection, but the avidity testing will help confirm that". It is schedule before MRI on 10/3/18 at 3:45 pm. Pt verbalized understanding of information and appointment letter placed in mail for pt and  to review.         "

## 2018-10-03 ENCOUNTER — HOSPITAL ENCOUNTER (OUTPATIENT)
Dept: RADIOLOGY | Facility: OTHER | Age: 28
Discharge: HOME | End: 2018-10-03
Payer: COMMERCIAL

## 2018-10-03 PROCEDURE — 74712 MRI FETAL SNGL/1ST GESTATION: CPT | Mod: TC

## 2018-10-03 PROCEDURE — 74712 MRI FETAL SNGL/1ST GESTATION: CPT | Mod: 26,,, | Performed by: RADIOLOGY

## 2018-10-04 ENCOUNTER — LAB VISIT (OUTPATIENT)
Dept: LAB | Facility: OTHER | Age: 28
End: 2018-10-04
Payer: COMMERCIAL

## 2018-10-04 DIAGNOSIS — O35.9XX0 FETAL ABNORMALITY AFFECTING MANAGEMENT OF MOTHER, SINGLE OR UNSPECIFIED FETUS: Primary | ICD-10-CM

## 2018-10-04 DIAGNOSIS — O35.9XX0 FETAL ABNORMALITY AFFECTING MANAGEMENT OF MOTHER, SINGLE OR UNSPECIFIED FETUS: ICD-10-CM

## 2018-10-04 PROCEDURE — 86644 CMV ANTIBODY: CPT

## 2018-10-04 PROCEDURE — 30000890 MAYO MISCELLANEOUS TEST (REFLEX)

## 2018-10-10 LAB — MAYO MISCELLANEOUS RESULT (REF): NORMAL

## 2018-10-15 ENCOUNTER — PATIENT MESSAGE (OUTPATIENT)
Dept: MATERNAL FETAL MEDICINE | Facility: CLINIC | Age: 28
End: 2018-10-15

## 2018-10-15 NOTE — TELEPHONE ENCOUNTER
Mrs. Robbins,  Please call me at your convenience to discuss the results from your lab work and fetal MRI.  [phone].  Dr. Watson

## 2018-10-16 ENCOUNTER — TELEPHONE (OUTPATIENT)
Dept: MATERNAL FETAL MEDICINE | Facility: CLINIC | Age: 28
End: 2018-10-16

## 2018-10-16 DIAGNOSIS — O35.9XX0 FETAL ABNORMALITY AFFECTING MANAGEMENT OF MOTHER, SINGLE OR UNSPECIFIED FETUS: Primary | ICD-10-CM

## 2018-10-16 NOTE — TELEPHONE ENCOUNTER
Spoke with patient's  and tried to set up a follow up appointment and he told me that he would call us back next week to schedule.

## 2018-10-30 ENCOUNTER — PROCEDURE VISIT (OUTPATIENT)
Dept: MATERNAL FETAL MEDICINE | Facility: CLINIC | Age: 28
End: 2018-10-30
Payer: COMMERCIAL

## 2018-10-30 ENCOUNTER — TELEPHONE (OUTPATIENT)
Dept: MATERNAL FETAL MEDICINE | Facility: CLINIC | Age: 28
End: 2018-10-30

## 2018-10-30 ENCOUNTER — INITIAL CONSULT (OUTPATIENT)
Dept: MATERNAL FETAL MEDICINE | Facility: CLINIC | Age: 28
End: 2018-10-30
Payer: COMMERCIAL

## 2018-10-30 ENCOUNTER — TELEPHONE (OUTPATIENT)
Dept: PEDIATRIC CARDIOLOGY | Facility: CLINIC | Age: 28
End: 2018-10-30

## 2018-10-30 VITALS
SYSTOLIC BLOOD PRESSURE: 100 MMHG | BODY MASS INDEX: 22.27 KG/M2 | DIASTOLIC BLOOD PRESSURE: 62 MMHG | WEIGHT: 121.06 LBS

## 2018-10-30 DIAGNOSIS — O35.9XX0 FETAL ABNORMALITY AFFECTING MANAGEMENT OF MOTHER, SINGLE OR UNSPECIFIED FETUS: ICD-10-CM

## 2018-10-30 DIAGNOSIS — O40.2XX0 POLYHYDRAMNIOS IN SECOND TRIMESTER, SINGLE OR UNSPECIFIED FETUS: ICD-10-CM

## 2018-10-30 DIAGNOSIS — O35.9XX0 KNOWN FETAL ANOMALY, ANTEPARTUM, SINGLE OR UNSPECIFIED FETUS: Primary | ICD-10-CM

## 2018-10-30 PROCEDURE — 99999 PR PBB SHADOW E&M-EST. PATIENT-LVL II: CPT | Mod: PBBFAC,,, | Performed by: OBSTETRICS & GYNECOLOGY

## 2018-10-30 PROCEDURE — 76816 OB US FOLLOW-UP PER FETUS: CPT | Mod: S$GLB,,, | Performed by: OBSTETRICS & GYNECOLOGY

## 2018-10-30 PROCEDURE — 3008F BODY MASS INDEX DOCD: CPT | Mod: CPTII,S$GLB,, | Performed by: OBSTETRICS & GYNECOLOGY

## 2018-10-30 PROCEDURE — 99213 OFFICE O/P EST LOW 20 MIN: CPT | Mod: 25,S$GLB,, | Performed by: OBSTETRICS & GYNECOLOGY

## 2018-10-30 NOTE — LETTER
October 30, 2018      Diana Rey MD  [address] [facility] [address]           [facility]  [address]  [facility] [address]  Phone: [phone]          Patient: Ximena Robbins   MR Number: [id number]   YOB: 1990   Date of Visit: 10/30/2018       Dear Dr. Diana Rey:    Thank you for referring Ximena Robbins to me for evaluation. Attached you will find relevant portions of my assessment and plan of care.    If you have questions, please do not hesitate to call me. I look forward to following Ximena Robbins along with you.    Sincerely,    Cheryl Flor RN    Enclosure  CC:  No Recipients    If you would like to receive this communication electronically, please contact [email or web address] or [phone] to request more information on [facility] access.    For providers and/or their staff who would like to refer a patient to [facility], please contact us through our one-stop-shop provider referral line, [facility], at [phone].    If you feel you have received this communication in error or would no longer like to receive these types of communications, please e-mail [email or web address]

## 2018-10-30 NOTE — TELEPHONE ENCOUNTER
----- Message from Cheryl Flor RN sent at 10/30/2018  1:55 PM CDT -----  Please call the pt's  to schedule. He will let you know if the date is ok since his schedule is hectic. If you can make it with Aleja or Atilio that would be ideal.     Thank You,     KAMALA Ochoa, RN  [facility]  Maternal Fetal Medicine  [phone]        ----- Message -----  From: Amol Armstrong RN  Sent: 10/30/2018   1:46 PM  To: Cheryl Flor RN    What if it's not with one of those docs?  We could do 11/14 with Dr. Russo in the morning at [facility]?  Aleja and Mouledgallito don't have anything in the next few weeks.  ----- Message -----  From: Cheryl Flor RN  Sent: 10/30/2018  10:07 AM  To: [facility] Fetal Cardiology    Hey guys,     This pt needs a fetal echo in two weeks. She has a suspected rt aortic arch, small stomach and cerebral ventriculomegaly. I attempted to schedule the appointment but nothing you all had available worked with the pt's 's schedule (he's an MD). Pt will need an Macedonian interpretor or Marrti. She speaks some English but  interprets as well. Please let me know once she is scheduled and I will schedule her with us. His best contact is [phone].     Dr Watson wants with Dr Masterson or Xiao (spelled it wrong)     Thank You,     KAMALA Ochoa, RN  [facility]  Maternal Fetal Medicine  [phone]

## 2018-10-30 NOTE — PROGRESS NOTES
"OB Ultrasound Report (see PDF version under imaging tab) and f/u consultation    Indication  ========    Evaluation of fetal growth and f/u fetal anomalies.    Maternal Assessment  ================    Weight 55 kg  Weight (lb) 121 lb  BP syst 100 mmHg  BP diast 62 mmHg    Method  ======    Transabdominal ultrasound examination. View: Good view.    Pregnancy  =========    [place] pregnancy. Number of fetuses: 1.    Dating  ======    Cycle: regular cycle  GA by "stated dating" 25 w + 3 d  YASMIN by "stated dating": 2/9/2019  Ultrasound examination on: 10/30/2018  GA by U/S based upon: AC, BPD, Femur, HC  GA by U/S 25 w + 1 d  YASMIN by U/S: 2/11/2019  Assigned: Dating performed on 08/21/2018, based on the external assessment  Assigned GA 25 w + 3 d  Assigned YASMIN: 2/9/2019    General Evaluation  ===============    Cardiac activity: present.  bpm.  Fetal movements: visualized.  Presentation: cephalic.  Placenta: anterior.  Umbilical cord: 2 vessel cord.  Amniotic fluid: Amount of AF: polyhydramnios.    Fetal Biometry  ===========    Fetal Biometry  BPD 64.3 mm 26w 0d Hadlock  OFD 81.0 mm 26w 2d Dhara  .9 mm 25w 2d Hadlock  .4 mm 24w 2d Hadlock  Femur 45.6 mm 25w 1d Hadlock   g 30% Ruben  Calculated by: Hadlock (BPD-HC-AC-FL)  EFW (lb) 1 lb  EFW (oz) 10 oz  Cephalic index 0.79  HC / AC 1.19  FL / BPD 0.71  FL / AC 0.23   bpm  Head / Face / Neck   8.4 mm    Fetal Anatomy  ===========    Cranium: normal  Midline falx: normal  Cavum septi pellucidi: normal  Cerebellum: normal  Cisterna magna: normal  Thalami: normal  Third ventricle: mildly dilated  Fourth ventricle: normal  Posterior fossa: normal  Lips: normal  4-chamber view: normal  RVOT: normal  LVOT: normal  3-vessel-trachea view: abnormal  3-vessel - trachea view: right aortic arch with left DA  Kidneys: normal  Bladder: normal  Stomach: small  Gender: male  Wants to know gender: yes  Other: A full anatomy survey previously " performed.    Consultation  ==========    Type: Abnormal Ultrasound Findings.  I spent 15 minutes on the consultation today with the patient and her spouse regarding results from prior testing, findings from today's  ultrasound exam, and recommendations for further evaluation and monitoring. More than 50% of the consultation was spent in  face-to-face counseling and coordination of care.  Fetal MRI confirmed mild lateral cerebral ventriculomegaly and did not show any other significant structural abnormalities. Maternal  serologies, including CMV IgG avidity testing, were not suggestive of acute infection. The quad screen showed a minimal elevation of  MSAFP but was otherwise normal.  Findings from today's exam, specifically improvement in size of the lateral cerebral ventricles and the new finding of polyhydramnios  and a small fetal stomach, were discussed. The risks of PTL/PTD and PROM associated with progressive polyhhydramnios were also  reviewed, as was the importance of careful surveillance for worsening polyhydramnios.  Recommendations for further evaluation also reviewed. The patient and her spouse were also encouraged to schedule f/u OB care  visits with Dr. Rey.    Impression  =========    The fetal lateral cerebral ventricles are normal in size on today's exam with measurements of 7.7 to 8.5 mm. The third ventricle  remains borderline/mildly dilated at 2 - 3 mm. The remainder of the intracranial anatomy is normal in appearance.  A right-sided aortic arch with a left DA is again suspected on today's study.  A new finding today is polyhydramnios. The fetal stomach remained relatively small throughout the study, so a possible cause for the  polyhydramnios is esophageal atresia +/- TEF. No other fetal structural anomalies associated with polyhydramnios are seen.    Recommendation  ==============    1. F/U exam in 2 weeks to reassess AFV and size of the fetal stomach  2. Fetal echo with Pediatric Cardiology  within the next 2 - 4 weeks.

## 2018-10-30 NOTE — PROGRESS NOTES
Dr Watson would like to see pt for follow up ultrasound and consult in 2 weeks in addition ordering a fetal echo. Discussed these orders with the pt and her . Attempted to schedule both appointments (together and separately), offering multiple dates but pt's  decline reporting those dates do not work with his schedule. Pt and her  informed of the importance of the fetal echo and follow up with Dr Watson. Both verbalized understanding of information. Message sent to fetal echo for scheduling and will coordinate MFM visit with that appointment per their request.

## 2018-10-30 NOTE — TELEPHONE ENCOUNTER
Spoke to Pt's . He confirmed fetal echo has been scheduled on November 14 at 8 am. Offered to have pt f/u with Dr Watson at 10 am the same day. He declined and states he will call the week of the 19th once he knows his schedule.

## 2018-11-14 ENCOUNTER — CLINICAL SUPPORT (OUTPATIENT)
Dept: PEDIATRIC CARDIOLOGY | Facility: CLINIC | Age: 28
End: 2018-11-14
Attending: OBSTETRICS & GYNECOLOGY
Payer: COMMERCIAL

## 2018-11-14 ENCOUNTER — OFFICE VISIT (OUTPATIENT)
Dept: PEDIATRIC CARDIOLOGY | Facility: CLINIC | Age: 28
End: 2018-11-14
Payer: COMMERCIAL

## 2018-11-14 VITALS
WEIGHT: 125.25 LBS | HEIGHT: 62 IN | HEART RATE: 60 BPM | BODY MASS INDEX: 23.05 KG/M2 | SYSTOLIC BLOOD PRESSURE: 96 MMHG | DIASTOLIC BLOOD PRESSURE: 50 MMHG

## 2018-11-14 DIAGNOSIS — O35.BXX0 ANOMALY OF HEART OF FETUS AFFECTING PREGNANCY, ANTEPARTUM, SINGLE OR UNSPECIFIED FETUS: ICD-10-CM

## 2018-11-14 DIAGNOSIS — O35.9XX0 KNOWN FETAL ANOMALY, ANTEPARTUM, SINGLE OR UNSPECIFIED FETUS: ICD-10-CM

## 2018-11-14 DIAGNOSIS — O35.09X0 PREGNANCY COMPLICATED BY FETAL CEREBRAL VENTRICULOMEGALY, SINGLE OR UNSPECIFIED FETUS: ICD-10-CM

## 2018-11-14 DIAGNOSIS — Z3A.27 27 WEEKS GESTATION OF PREGNANCY: ICD-10-CM

## 2018-11-14 DIAGNOSIS — O40.3XX0 POLYHYDRAMNIOS AFFECTING PREGNANCY IN THIRD TRIMESTER: ICD-10-CM

## 2018-11-14 PROCEDURE — 99999 PR PBB SHADOW E&M-EST. PATIENT-LVL III: CPT | Mod: PBBFAC,,, | Performed by: PEDIATRICS

## 2018-11-14 PROCEDURE — 76825 ECHO EXAM OF FETAL HEART: CPT | Mod: S$GLB,,, | Performed by: PEDIATRICS

## 2018-11-14 PROCEDURE — 3008F BODY MASS INDEX DOCD: CPT | Mod: CPTII,S$GLB,, | Performed by: PEDIATRICS

## 2018-11-14 PROCEDURE — 93325 DOPPLER ECHO COLOR FLOW MAPG: CPT | Mod: S$GLB,,, | Performed by: PEDIATRICS

## 2018-11-14 PROCEDURE — 99204 OFFICE O/P NEW MOD 45 MIN: CPT | Mod: 25,S$GLB,, | Performed by: PEDIATRICS

## 2018-11-14 PROCEDURE — 76827 ECHO EXAM OF FETAL HEART: CPT | Mod: S$GLB,,, | Performed by: PEDIATRICS

## 2018-11-14 NOTE — PROGRESS NOTES
I had the pleasure of evaluating Ximena Robbins who is now 28 y.o.  and carrying her 1st pregnancy at 27 4/7 weeks gestation. She was referred for evaluation of the fetal heart due to increased risks for congenital heart disease associated with cerebral ventriculomegaly, polyhydramnios, possible VSD and probable right aortic arch noted at [facility] evaluations..      Current Outpatient Medications:     FOLIC ACID, BULK, MISC, by Misc.(Non-Drug; Combo Route) route., Disp: , Rfl:     PNV,calcium 72-iron-folic acid 27 mg iron- 1 mg Tab, Take 1 tablet (1 each total) by mouth once daily., Disp: 30 tablet, Rfl: 11    ranitidine (ZANTAC) 150 MG tablet, Take 150 mg by mouth Daily., Disp: , Rfl:   Review of patient's allergies indicates:  No Known Allergies    Maternal factors increasing risk for congenital heart disease: As noted above  Paternal factors increasing risk for congenital heart disease: Unremarkable    FETAL ECHOCARDIOGRAM (preliminary):  Fetal echocardiogram performed to evaluate for question of right aortic arch with possible malalignment type VSD suspected based on MRI and previous Maternal Fetal Medicine evaluations:  Several images suggest but not diagnostic for the presence of a small to moderate ventricular septal defect with mild malalignment of the ventricular septum.  Color flow Doppler does not demonstrate shunt in this area suggesting that this is simply dropout artifact.  Images suggest normally positioned aortic arch.  Otherwise normal anatomical connections and function for estimated gestational age.   (Full report in electronic medical record)    I reviewed the information available from this study with the parents.  Discussion was primarily with the father who is one of [facility]'s Internal Medicine physicians and has a good understanding of physiology.  I used a diagram of fetal cardiac rate is the allergy to explain the observations today.  Basically, the images that I was able to obtain of the  aortic arch appear to demonstrate normal sinus although this is very difficult to confirm on fetal evaluation with inability to demonstrate the trachea well on the images obtained.  I also went over my concern that there may be a ventricular septal defect with a mild degree of malalignment of the ventricular septum.  The remainder the anatomical connections and function appear to be normal with no evidence of significant pulmonary or aortic stenosis.  It is quite conceivable with a history present that this baby would have tetralogy of Fallot that is associated with a higher risk for right aortic arch as well as tracheoesophageal fistula.  Based on the imaging that is present, I do not think that this increases the risk for fetal demise or for normal management of delivery.  I also do not anticipate that the baby would be cyanotic after delivery although this is quite difficult to predict.  I discussed the possibility of repeating this study in 4-6 weeks with the father.  He is not sure that he wants to proceed with this plan and expressed his comfort with simply planning for a prompt evaluation of the infant after delivery to provided and more definitive diagnosis of any cardiovascular abnormalities that might be present.    Briefly reviewed the American Heart Association web site with the family can go to discuss the concerns that may arise after delivery.  I am not sure that the mother has a good understanding based on our discussions today but Dr. Robbins can use these diagrams to help explain the concerns to her at his leisure.  Unless other issues arise to prompt a more urgent reason to evaluate later during pregnancy, have left the decision for further fetal evaluations up to the family.  They are to call me and I will arrange for a repeat fetal cardiac evaluation convenient with the father schedule if they wish to follow up with another fetal cardiac study.  Otherwise we will plan to evaluate the infant  after delivery.      I answered all the parent's questions. I have not scheduled another evaluation; however, if questions arise later during gestation or after delivery, I would be happy to assist in any way. Ms. Robbins is comfortable with the plan.        RECOMMENDATIONS:  Repeat fetal echocardiogram in 4 -6 weeks if family wishes (father will call to schedule).  Otherwise would manage as provisional diagnosis of tetralogy of Fallot - unlikely to present with ductal dependent physiology.  Prompt evaluation of infant after delivery.      Note:  Over 50% of visit in consultation with the family >45 minutes

## 2018-11-14 NOTE — LETTER
November 14, 2018        Diana Rey MD  [address] [facility] [address]             [facility] - Peds Cardiology  [address]  [facility] [address]  Phone: [phone]  Fax: [phone]   Patient: Ximena Robbins   MR Number: [id number]   YOB: 1990   Date of Visit: 11/14/2018       Dear Dr. Rey:    Thank you for referring Ximena Robbins to me for evaluation. Below are the relevant portions of my assessment and plan of care.            If you have questions, please do not hesitate to call me. I look forward to following Ximena along with you.    Sincerely,      Neil Russo MD           CC  Peri Watson MD

## 2018-11-14 NOTE — LETTER
November 14, 2018        No Recipients             [facility]  [address]  [facility] [address]  Phone: [phone]  Fax: [phone]   Patient: Ximena Robbins   MR Number: [id number]   YOB: 1990   Date of Visit: 11/14/2018       Dear Dr. Park Recipients:    Thank you for referring Ximena Robbins to me for evaluation. Attached you will find relevant portions of my assessment and plan of care.    If you have questions, please do not hesitate to call me. I look forward to following Ximena Robbins along with you.    Sincerely,      Neil Russo MD            CC  No Recipients    Enclosure

## 2018-11-15 ENCOUNTER — OFFICE VISIT (OUTPATIENT)
Dept: URGENT CARE | Facility: CLINIC | Age: 28
End: 2018-11-15
Payer: COMMERCIAL

## 2018-11-15 ENCOUNTER — IMMUNIZATION (OUTPATIENT)
Dept: URGENT CARE | Facility: CLINIC | Age: 28
End: 2018-11-15
Payer: COMMERCIAL

## 2018-11-15 VITALS
OXYGEN SATURATION: 100 % | HEART RATE: 64 BPM | HEIGHT: 62 IN | RESPIRATION RATE: 18 BRPM | WEIGHT: 123.44 LBS | SYSTOLIC BLOOD PRESSURE: 94 MMHG | BODY MASS INDEX: 22.71 KG/M2 | TEMPERATURE: 98 F | DIASTOLIC BLOOD PRESSURE: 62 MMHG

## 2018-11-15 DIAGNOSIS — Z23 FLU VACCINE NEED: Primary | ICD-10-CM

## 2018-11-15 DIAGNOSIS — H65.91 OTITIS MEDIA WITH EFFUSION, RIGHT: Primary | ICD-10-CM

## 2018-11-15 PROCEDURE — 3008F BODY MASS INDEX DOCD: CPT | Mod: CPTII,S$GLB,, | Performed by: FAMILY MEDICINE

## 2018-11-15 PROCEDURE — 90471 IMMUNIZATION ADMIN: CPT | Mod: S$GLB,,, | Performed by: FAMILY MEDICINE

## 2018-11-15 PROCEDURE — 99213 OFFICE O/P EST LOW 20 MIN: CPT | Mod: S$GLB,,, | Performed by: FAMILY MEDICINE

## 2018-11-15 PROCEDURE — 90686 IIV4 VACC NO PRSV 0.5 ML IM: CPT | Mod: S$GLB,,, | Performed by: FAMILY MEDICINE

## 2018-11-15 RX ORDER — AMOXICILLIN 875 MG/1
875 TABLET, FILM COATED ORAL 2 TIMES DAILY
Qty: 20 TABLET | Refills: 0 | Status: SHIPPED | OUTPATIENT
Start: 2018-11-15 | End: 2018-11-25

## 2018-11-15 NOTE — PROGRESS NOTES
"Subjective:       Patient ID: Ximena Robbins is a 28 y.o. female.    Vitals:  height is 5' 1.8" (1.57 m) and weight is 56 kg (123 lb 7.3 oz). Her oral temperature is 97.6 °F (36.4 °C). Her blood pressure is 94/62 and her pulse is 64. Her respiration is 18 and oxygen saturation is 100%.     Chief Complaint: Otalgia    This is a 28 y.o. female who presents today with a chief complaint of   Rt ear fullness for 3 months. No other sx      Otalgia    There is pain in the right ear. This is a new problem. The current episode started more than 1 month ago. The problem occurs constantly. The problem has been unchanged. There has been no fever. The pain is at a severity of 0/10. The patient is experiencing no pain. Pertinent negatives include no coughing, diarrhea, headaches, rash, sore throat or vomiting. The treatment provided no relief.       Constitution: Negative for chills, fatigue and fever.   HENT: Positive for ear pain. Negative for congestion and sore throat.         Ear fullness   Neck: Negative for painful lymph nodes.   Cardiovascular: Negative for chest pain and leg swelling.   Eyes: Negative for double vision and blurred vision.   Respiratory: Negative for cough and shortness of breath.    Gastrointestinal: Negative for nausea, vomiting and diarrhea.   Genitourinary: Negative for dysuria, frequency, urgency and history of kidney stones.   Musculoskeletal: Negative for joint pain, joint swelling, muscle cramps and muscle ache.   Skin: Negative for color change, pale, rash and bruising.   Allergic/Immunologic: Negative for seasonal allergies.   Neurological: Negative for dizziness, history of vertigo, light-headedness, passing out and headaches.   Hematologic/Lymphatic: Negative for swollen lymph nodes.   Psychiatric/Behavioral: Negative for nervous/anxious, sleep disturbance and depression. The patient is not nervous/anxious.        Objective:      Physical Exam   Constitutional: She appears well-developed and " well-nourished.   HENT:   Head: Normocephalic and atraumatic.   Right Ear: Tympanic membrane is erythematous (minimal) and bulging.   Left Ear: Tympanic membrane and ear canal normal.   Cardiovascular: Normal rate, regular rhythm and normal heart sounds.   Pulmonary/Chest: Effort normal and breath sounds normal.   Nursing note and vitals reviewed.      Assessment:       1. Otitis media with effusion, right        Plan:         Otitis media with effusion, right    Other orders  -     amoxicillin (AMOXIL) 875 MG tablet; Take 1 tablet (875 mg total) by mouth 2 (two) times daily. for 10 days  Dispense: 20 tablet; Refill: 0    to discuss with OB/GYn treatment now vs wait and see approach

## 2018-11-15 NOTE — PATIENT INSTRUCTIONS
Anatomy of the Ear    The ear is a complex and delicate organ. It collects sound waves so you can hear the world around you. The ear also has a second function--it helps you keep your balance. Your ear can be divided into 3 parts. The outer ear and middle ear help collect and amplify sound. The inner ear converts sound waves to messages that are sent to the brain. The inner ear also senses the movement and position of your head and body so you can maintain your balance and see clearly, even when you change positions.  The mastoid bone surrounds the middle ear. The external ear collects sound waves. The ear canal carries sound waves to the eardrum. The eardrum vibrates from sound waves, setting the middle ear bones in motion. The middle ear bones (ossicles) vibrate, transmitting sound waves to the inner ear. When the ear is healthy, air pressure remains balanced in the middle ear. The eustachian tube helps control air pressure in the middle ear. The semicircular canals help maintain balance. The vestibular nerve carries balance signals to the brain. The auditory nerve carries sound signals to the brain. The cochlea picks up sound waves and makes nerve signals.     Date Last Reviewed: 10/1/2016  © 3952-6621 [employer]. [address] [place] [address], [place] [address]. All rights reserved. This information is not intended as a substitute for professional medical care. Always follow your healthcare professional's instructions.

## 2018-11-20 ENCOUNTER — TELEPHONE (OUTPATIENT)
Dept: OBSTETRICS AND GYNECOLOGY | Facility: CLINIC | Age: 28
End: 2018-11-20

## 2018-11-20 NOTE — TELEPHONE ENCOUNTER
----- Message from Onur Clark sent at 11/20/2018  7:16 AM CST -----  Contact: [email or web address]  Message from [facility], System Message sent at 11/20/2018  2:31 AM CST -----    Appointment Request From: Ximena Robbins    With Provider: Diana Rey MD [[facility]]    Preferred Date Range: 11/21/2018 - 11/21/2018    Preferred Times: Any time    Reason for visit: Existing Patient    Comments:  Hi, I would like to have my prenatal F/U with my OB Dr. Rey or any alternative provider if she is not available on WEDNESDAY please.

## 2018-11-27 ENCOUNTER — HOSPITAL ENCOUNTER (INPATIENT)
Facility: OTHER | Age: 28
LOS: 4 days | Discharge: HOME | End: 2018-12-01
Attending: OBSTETRICS & GYNECOLOGY | Admitting: PEDIATRICS
Payer: COMMERCIAL

## 2018-11-27 ENCOUNTER — ANESTHESIA EVENT (OUTPATIENT)
Dept: OBSTETRICS AND GYNECOLOGY | Facility: OTHER | Age: 28
End: 2018-11-27
Payer: COMMERCIAL

## 2018-11-27 ENCOUNTER — CLINICAL SUPPORT (OUTPATIENT)
Dept: OBSTETRICS AND GYNECOLOGY | Facility: CLINIC | Age: 28
End: 2018-11-27
Payer: COMMERCIAL

## 2018-11-27 ENCOUNTER — ANESTHESIA (OUTPATIENT)
Dept: OBSTETRICS AND GYNECOLOGY | Facility: OTHER | Age: 28
End: 2018-11-27
Payer: COMMERCIAL

## 2018-11-27 ENCOUNTER — ROUTINE PRENATAL (OUTPATIENT)
Dept: OBSTETRICS AND GYNECOLOGY | Facility: CLINIC | Age: 28
End: 2018-11-27
Payer: COMMERCIAL

## 2018-11-27 VITALS — BODY MASS INDEX: 23.54 KG/M2 | DIASTOLIC BLOOD PRESSURE: 60 MMHG | SYSTOLIC BLOOD PRESSURE: 92 MMHG | WEIGHT: 127.88 LBS

## 2018-11-27 DIAGNOSIS — Z67.91 RH NEGATIVE STATUS DURING PREGNANCY IN THIRD TRIMESTER: ICD-10-CM

## 2018-11-27 DIAGNOSIS — O28.8 NON-REACTIVE NST (NON-STRESS TEST): ICD-10-CM

## 2018-11-27 DIAGNOSIS — Z3A.29 29 WEEKS GESTATION OF PREGNANCY: Primary | ICD-10-CM

## 2018-11-27 DIAGNOSIS — O26.893 RH NEGATIVE STATUS DURING PREGNANCY IN THIRD TRIMESTER: ICD-10-CM

## 2018-11-27 LAB
BASOPHILS # BLD AUTO: 0.02 K/UL
BASOPHILS NFR BLD: 0.1 %
DIFFERENTIAL METHOD: ABNORMAL
EOSINOPHIL # BLD AUTO: 0 K/UL
EOSINOPHIL NFR BLD: 0.2 %
ERYTHROCYTE [DISTWIDTH] IN BLOOD BY AUTOMATED COUNT: 12.5 %
HCT VFR BLD AUTO: 33 %
HGB BLD-MCNC: 11.4 G/DL
LYMPHOCYTES # BLD AUTO: 2.1 K/UL
LYMPHOCYTES NFR BLD: 15.4 %
MCH RBC QN AUTO: 31.2 PG
MCHC RBC AUTO-ENTMCNC: 34.5 G/DL
MCV RBC AUTO: 90 FL
MONOCYTES # BLD AUTO: 0.6 K/UL
MONOCYTES NFR BLD: 4.6 %
NEUTROPHILS # BLD AUTO: 10.7 K/UL
NEUTROPHILS NFR BLD: 79.3 %
PLATELET # BLD AUTO: 268 K/UL
PMV BLD AUTO: 10.3 FL
RBC # BLD AUTO: 3.65 M/UL
WBC # BLD AUTO: 13.5 K/UL

## 2018-11-27 PROCEDURE — 96376 TX/PRO/DX INJ SAME DRUG ADON: CPT

## 2018-11-27 PROCEDURE — 90471 IMMUNIZATION ADMIN: CPT | Mod: S$GLB,,, | Performed by: NURSE PRACTITIONER

## 2018-11-27 PROCEDURE — 85025 COMPLETE CBC W/AUTO DIFF WBC: CPT | Mod: 91

## 2018-11-27 PROCEDURE — 11000001 HC ACUTE MED/SURG PRIVATE ROOM

## 2018-11-27 PROCEDURE — 25000003 PHARM REV CODE 250: Performed by: STUDENT IN AN ORGANIZED HEALTH CARE EDUCATION/TRAINING PROGRAM

## 2018-11-27 PROCEDURE — 96361 HYDRATE IV INFUSION ADD-ON: CPT

## 2018-11-27 PROCEDURE — 86592 SYPHILIS TEST NON-TREP QUAL: CPT

## 2018-11-27 PROCEDURE — 86703 HIV-1/HIV-2 1 RESULT ANTBDY: CPT

## 2018-11-27 PROCEDURE — 96372 THER/PROPH/DIAG INJ SC/IM: CPT | Mod: 59,S$GLB,, | Performed by: NURSE PRACTITIONER

## 2018-11-27 PROCEDURE — 0502F SUBSEQUENT PRENATAL CARE: CPT | Mod: S$GLB,,, | Performed by: NURSE PRACTITIONER

## 2018-11-27 PROCEDURE — 99285 EMERGENCY DEPT VISIT HI MDM: CPT | Mod: 25

## 2018-11-27 PROCEDURE — 63600175 PHARM REV CODE 636 W HCPCS: Performed by: STUDENT IN AN ORGANIZED HEALTH CARE EDUCATION/TRAINING PROGRAM

## 2018-11-27 PROCEDURE — 99999 PR PBB SHADOW E&M-EST. PATIENT-LVL I: CPT | Mod: PBBFAC,,,

## 2018-11-27 PROCEDURE — 96366 THER/PROPH/DIAG IV INF ADDON: CPT

## 2018-11-27 PROCEDURE — 90715 TDAP VACCINE 7 YRS/> IM: CPT | Mod: S$GLB,,, | Performed by: NURSE PRACTITIONER

## 2018-11-27 PROCEDURE — 59025 FETAL NON-STRESS TEST: CPT

## 2018-11-27 PROCEDURE — 96365 THER/PROPH/DIAG IV INF INIT: CPT

## 2018-11-27 PROCEDURE — 99999 PR PBB SHADOW E&M-EST. PATIENT-LVL II: CPT | Mod: PBBFAC,,, | Performed by: NURSE PRACTITIONER

## 2018-11-27 PROCEDURE — 96372 THER/PROPH/DIAG INJ SC/IM: CPT | Mod: 59

## 2018-11-27 RX ORDER — SIMETHICONE 80 MG
1 TABLET,CHEWABLE ORAL EVERY 6 HOURS PRN
Status: DISCONTINUED | OUTPATIENT
Start: 2018-11-27 | End: 2018-11-30

## 2018-11-27 RX ORDER — ONDANSETRON 8 MG/1
8 TABLET, ORALLY DISINTEGRATING ORAL EVERY 8 HOURS PRN
Status: DISCONTINUED | OUTPATIENT
Start: 2018-11-27 | End: 2018-11-30

## 2018-11-27 RX ORDER — CALCIUM GLUCONATE 98 MG/ML
1 INJECTION, SOLUTION INTRAVENOUS
Status: DISCONTINUED | OUTPATIENT
Start: 2018-11-27 | End: 2018-11-28

## 2018-11-27 RX ORDER — MAGNESIUM SULFATE HEPTAHYDRATE 40 MG/ML
2 INJECTION, SOLUTION INTRAVENOUS CONTINUOUS
Status: DISCONTINUED | OUTPATIENT
Start: 2018-11-27 | End: 2018-11-28

## 2018-11-27 RX ORDER — SODIUM CHLORIDE, SODIUM LACTATE, POTASSIUM CHLORIDE, CALCIUM CHLORIDE 600; 310; 30; 20 MG/100ML; MG/100ML; MG/100ML; MG/100ML
1000 INJECTION, SOLUTION INTRAVENOUS CONTINUOUS
Status: ACTIVE | OUTPATIENT
Start: 2018-11-27 | End: 2018-11-28

## 2018-11-27 RX ORDER — DIPHENHYDRAMINE HYDROCHLORIDE 50 MG/ML
25 INJECTION INTRAMUSCULAR; INTRAVENOUS EVERY 4 HOURS PRN
Status: DISCONTINUED | OUTPATIENT
Start: 2018-11-27 | End: 2018-11-30

## 2018-11-27 RX ORDER — MAGNESIUM SULFATE HEPTAHYDRATE 40 MG/ML
6 INJECTION, SOLUTION INTRAVENOUS ONCE
Status: COMPLETED | OUTPATIENT
Start: 2018-11-27 | End: 2018-11-27

## 2018-11-27 RX ORDER — BETAMETHASONE SODIUM PHOSPHATE AND BETAMETHASONE ACETATE 3; 3 MG/ML; MG/ML
12 INJECTION, SUSPENSION INTRA-ARTICULAR; INTRALESIONAL; INTRAMUSCULAR; SOFT TISSUE ONCE
Status: COMPLETED | OUTPATIENT
Start: 2018-11-28 | End: 2018-11-28

## 2018-11-27 RX ORDER — AMOXICILLIN 250 MG
1 CAPSULE ORAL NIGHTLY PRN
Status: DISCONTINUED | OUTPATIENT
Start: 2018-11-27 | End: 2018-11-30

## 2018-11-27 RX ORDER — BETAMETHASONE SODIUM PHOSPHATE AND BETAMETHASONE ACETATE 3; 3 MG/ML; MG/ML
12 INJECTION, SUSPENSION INTRA-ARTICULAR; INTRALESIONAL; INTRAMUSCULAR; SOFT TISSUE ONCE
Status: COMPLETED | OUTPATIENT
Start: 2018-11-27 | End: 2018-11-27

## 2018-11-27 RX ORDER — PRENATAL WITH FERROUS FUM AND FOLIC ACID 3080; 920; 120; 400; 22; 1.84; 3; 20; 10; 1; 12; 200; 27; 25; 2 [IU]/1; [IU]/1; MG/1; [IU]/1; MG/1; MG/1; MG/1; MG/1; MG/1; MG/1; UG/1; MG/1; MG/1; MG/1; MG/1
1 TABLET ORAL DAILY
Status: DISCONTINUED | OUTPATIENT
Start: 2018-11-28 | End: 2018-11-30

## 2018-11-27 RX ORDER — DIPHENHYDRAMINE HCL 25 MG
25 CAPSULE ORAL EVERY 4 HOURS PRN
Status: DISCONTINUED | OUTPATIENT
Start: 2018-11-27 | End: 2018-11-30

## 2018-11-27 RX ADMIN — PROMETHAZINE HYDROCHLORIDE 12.5 MG: 25 INJECTION, SOLUTION INTRAMUSCULAR; INTRAVENOUS at 08:11

## 2018-11-27 RX ADMIN — SODIUM CHLORIDE, SODIUM LACTATE, POTASSIUM CHLORIDE, AND CALCIUM CHLORIDE 1000 ML: .6; .31; .03; .02 INJECTION, SOLUTION INTRAVENOUS at 06:11

## 2018-11-27 RX ADMIN — MAGNESIUM SULFATE HEPTAHYDRATE 2 G/HR: 40 INJECTION, SOLUTION INTRAVENOUS at 07:11

## 2018-11-27 RX ADMIN — MAGNESIUM SULFATE HEPTAHYDRATE 6 G: 40 INJECTION, SOLUTION INTRAVENOUS at 06:11

## 2018-11-27 RX ADMIN — BETAMETHASONE SODIUM PHOSPHATE AND BETAMETHASONE ACETATE 12 MG: 3; 3 INJECTION, SUSPENSION INTRA-ARTICULAR; INTRALESIONAL; INTRAMUSCULAR at 06:11

## 2018-11-27 NOTE — PROGRESS NOTES
Here for TDAP injection. Patient without complaint of pain at this time ,Injection given. Tolerated well. No pain noted after injection.  Advised to wait in lobby 15 minutes and report any adverse reactions.         Site: [facility]    Baby Friendly Handout Given to Patient    Here for rhogam injection , no complaints at this time. Verified patient is RH negative.   No complaint of pain before or after injection. Patient advised to wait 15 minutes before leaving and report any adverse reactions.      Site: [facility]

## 2018-11-27 NOTE — PROGRESS NOTES
Here for routine OB appt at 29w3d, with no complaints.  Reports good FM.  Denies LOF, denies VB, denies contractions. Needs glucose screen.. No showed for last appointment with me, last seen by MFM in October. Dx with polyhydramnios, needs f/u scan. Occ right upper abdominal pain, no association with food. Appears to be from baby balling up. Here with . Discussed need for Tdap and Rhogam. Reviewed warning signs of Labor and Preeclampsia.  Daily FM counts reinforced.  F/U scheduled 2 weeks with me  Tdap, GTT, and Rhogam scheduled

## 2018-11-28 LAB
BACTERIA #/AREA URNS HPF: NORMAL /HPF
BILIRUB UR QL STRIP: NEGATIVE
CLARITY UR: CLEAR
COLOR UR: YELLOW
GLUCOSE UR QL STRIP: NEGATIVE
HGB UR QL STRIP: ABNORMAL
HIV 1+2 AB+HIV1 P24 AG SERPL QL IA: NEGATIVE
KETONES UR QL STRIP: ABNORMAL
LEUKOCYTE ESTERASE UR QL STRIP: NEGATIVE
MAGNESIUM SERPL-MCNC: 7 MG/DL
MICROSCOPIC COMMENT: NORMAL
NITRITE UR QL STRIP: NEGATIVE
PH UR STRIP: 6 [PH] (ref 5–8)
POCT GLUCOSE: 100 MG/DL (ref 70–110)
PROT UR QL STRIP: NEGATIVE
RBC #/AREA URNS HPF: 1 /HPF (ref 0–4)
RPR SER QL: NORMAL
SP GR UR STRIP: >=1.03 (ref 1–1.03)
SQUAMOUS #/AREA URNS HPF: 3 /HPF
URN SPEC COLLECT METH UR: ABNORMAL
UROBILINOGEN UR STRIP-ACNC: NEGATIVE EU/DL
WBC #/AREA URNS HPF: 4 /HPF (ref 0–5)

## 2018-11-28 PROCEDURE — 25000003 PHARM REV CODE 250: Performed by: STUDENT IN AN ORGANIZED HEALTH CARE EDUCATION/TRAINING PROGRAM

## 2018-11-28 PROCEDURE — 87086 URINE CULTURE/COLONY COUNT: CPT

## 2018-11-28 PROCEDURE — 11000001 HC ACUTE MED/SURG PRIVATE ROOM

## 2018-11-28 PROCEDURE — 63600175 PHARM REV CODE 636 W HCPCS: Performed by: STUDENT IN AN ORGANIZED HEALTH CARE EDUCATION/TRAINING PROGRAM

## 2018-11-28 PROCEDURE — 25000003 PHARM REV CODE 250: Performed by: PEDIATRICS

## 2018-11-28 PROCEDURE — 81000 URINALYSIS NONAUTO W/SCOPE: CPT

## 2018-11-28 PROCEDURE — 72100003 HC LABOR CARE, EA. ADDL. 8 HRS

## 2018-11-28 PROCEDURE — 72100002 HC LABOR CARE, 1ST 8 HOURS

## 2018-11-28 PROCEDURE — 96361 HYDRATE IV INFUSION ADD-ON: CPT

## 2018-11-28 PROCEDURE — 59025 FETAL NON-STRESS TEST: CPT | Mod: 26,,, | Performed by: OBSTETRICS & GYNECOLOGY

## 2018-11-28 PROCEDURE — 36415 COLL VENOUS BLD VENIPUNCTURE: CPT

## 2018-11-28 PROCEDURE — 83735 ASSAY OF MAGNESIUM: CPT

## 2018-11-28 PROCEDURE — 99232 SBSQ HOSP IP/OBS MODERATE 35: CPT | Mod: 25,,, | Performed by: OBSTETRICS & GYNECOLOGY

## 2018-11-28 RX ORDER — SODIUM CHLORIDE, SODIUM LACTATE, POTASSIUM CHLORIDE, CALCIUM CHLORIDE 600; 310; 30; 20 MG/100ML; MG/100ML; MG/100ML; MG/100ML
1000 INJECTION, SOLUTION INTRAVENOUS CONTINUOUS
Status: ACTIVE | OUTPATIENT
Start: 2018-11-28 | End: 2018-11-29

## 2018-11-28 RX ORDER — PANTOPRAZOLE SODIUM 40 MG/1
40 TABLET, DELAYED RELEASE ORAL DAILY
Status: DISCONTINUED | OUTPATIENT
Start: 2018-11-28 | End: 2018-12-01 | Stop reason: HOSPADM

## 2018-11-28 RX ORDER — DEXTROSE MONOHYDRATE, SODIUM CHLORIDE, AND POTASSIUM CHLORIDE 50; 1.49; 4.5 G/1000ML; G/1000ML; G/1000ML
INJECTION, SOLUTION INTRAVENOUS CONTINUOUS
Status: DISCONTINUED | OUTPATIENT
Start: 2018-11-28 | End: 2018-11-28

## 2018-11-28 RX ADMIN — DEXTROSE MONOHYDRATE, SODIUM CHLORIDE, AND POTASSIUM CHLORIDE: 50; 4.5; 1.49 INJECTION, SOLUTION INTRAVENOUS at 01:11

## 2018-11-28 RX ADMIN — MAGNESIUM SULFATE HEPTAHYDRATE 2 G/HR: 40 INJECTION, SOLUTION INTRAVENOUS at 12:11

## 2018-11-28 RX ADMIN — BETAMETHASONE SODIUM PHOSPHATE AND BETAMETHASONE ACETATE 12 MG: 3; 3 INJECTION, SUSPENSION INTRA-ARTICULAR; INTRALESIONAL; INTRAMUSCULAR at 07:11

## 2018-11-28 RX ADMIN — PANTOPRAZOLE SODIUM 40 MG: 40 TABLET, DELAYED RELEASE ORAL at 02:11

## 2018-11-28 RX ADMIN — ONDANSETRON 8 MG: 8 TABLET, ORALLY DISINTEGRATING ORAL at 05:11

## 2018-11-28 RX ADMIN — SODIUM CHLORIDE, SODIUM LACTATE, POTASSIUM CHLORIDE, AND CALCIUM CHLORIDE 1000 ML: .6; .31; .03; .02 INJECTION, SOLUTION INTRAVENOUS at 03:11

## 2018-11-28 RX ADMIN — ONDANSETRON 8 MG: 8 TABLET, ORALLY DISINTEGRATING ORAL at 10:11

## 2018-11-28 NOTE — PROGRESS NOTES
"[facility]  Obstetrics  Antepartum Progress Note    Patient Name: Ximena Robbins  MRN: [id number]  Admission Date: 2018  Hospital Length of Stay: 1 days  Attending Physician: Anne Gardner MD  Primary Care Provider: Primary Doctor No    Subjective:     Principal Problem:Non-reactive NST (non-stress test)    HPI:  Ximena Robbins is a 28 y.o. F at 29w3d presents complaining of abdominal pain for the past couple of days. This IUP is complicated by Rh negative status, fetal anomalies ( including right aortic arch, polyhydramnios and small stomach,) parental consanguity, and language barrier.  Patient reports contractions, denies vaginal bleeding, denies LOF.   Fetal Movement: normal.       Hospital Course:  2018 Admitted for observation 2/2 to contractions at 29 w3d. Cvx 60/-3, FFN unable to be checked 2/2 to recent intercourse. Noted FHT decelerations with contractions in KEEGAN. Maternal resuscitation able to be performed. BMZ given, 6g Mg load started. Admitted for continuous observation.   2018 - Patient stable overnight. Contractions noted to have "spaced out." Continued on Mag Sulfate for fetal neuro-protection.     Interval History:  Patient denies complaints on AM rounds. Had some contractions around 4:00 the patient states.  Endorses good fetal movements. No vaginal bleeding or leaking fluids.       Objective:     Vital Signs (Most Recent):  Temp: 98.2 °F (36.8 °C) (18 0420)  Pulse: 73 (18 0640)  Resp: 18 (18 0420)  BP: (!) 85/50 (18 0640)  SpO2: 97 % (18 0640) Vital Signs (24h Range):  Temp:  [97.6 °F (36.4 °C)-98.8 °F (37.1 °C)] 98.2 °F (36.8 °C)  Pulse:  [51-92] 73  Resp:  [18] 18  SpO2:  [97 %-100 %] 97 %  BP: ()/(48-69) 85/50        There is no height or weight on file to calculate BMI.    FHT: 120, moderate variability, accels present, no decells, Category 1 - Reassuring  TOCO: Rare      Intake/Output Summary (Last 24 " hours) at 2018 0658  Last data filed at 2018 0600  Gross per 24 hour   Intake 1455.01 ml   Output 1280 ml   Net 175.01 ml       Cervical Exam:  Dilation:  1  Effacement:  60%  Station: -3  Presentation: Vertex     Significant Labs:  Recent Lab Results       18  1830   18  1250        Antibody Identification   POS  Comment:  Rhogam Antibody     Baso # 0.02 0.01     Basophil% 0.1 0.1     Differential Method Automated Automated     Eos # 0.0 0.1     Eosinophil% 0.2 0.5     Gran # (ANC) 10.7 11.5     Gran% 79.3 78.8     Group & Rh   O NEG     Hematocrit 33.0 34.1     Hemoglobin 11.4 11.5     INDIRECT LESLIE   POS     Lymph # 2.1 2.3     Lymph% 15.4 15.8     MCH 31.2 31.2     MCHC 34.5 33.7     MCV 90 92     Mono # 0.6 0.6     Mono% 4.6 4.2     MPV 10.3 10.5     OB Glucose Screen   65     Platelets 268 282     RBC 3.65 3.69     RDW 12.5 12.6     WBC 13.50 14.65           Physical Exam:   Constitutional: She is oriented to person, place, and time. She appears well-developed and well-nourished. No distress.   Patient appears to be in no acute distress or pain.    HENT:   Head: Normocephalic and atraumatic.    Eyes: Conjunctivae are normal.    Neck: Neck supple.    Cardiovascular: Normal rate, regular rhythm and intact distal pulses.     Pulmonary/Chest: Effort normal. No respiratory distress.        Abdominal: Soft. She exhibits no distension and no abdominal incision. There is no tenderness. There is no rebound and no guarding.                 Neurological: She is alert and oriented to person, place, and time.    Skin: Skin is warm and dry. She is not diaphoretic.    Psychiatric: She has a normal mood and affect. Her behavior is normal. Judgment and thought content normal.       Assessment/Plan:     28 y.o. female  at 29w4d for:    * Non-reactive NST (non-stress test)    - non reassuring FHTs noted in KEEGAN  - Contraction significantly diminished  - BMZ given > 2nd dose today  - On Magnesium for  neuroprotection  - NPO   - continuous monitoring  - Consider De-Escalation with continued improved clinical status  - delivery if continued late decelerations   - discussed with MFM   - No tocolytics if no cervical change      Polyhydramnios affecting pregnancy in third trimester    - noted on last MFM US   - concern for esophageal atresia with small stomach noted      Fetal cardiac anomaly affecting pregnancy, antepartum    - right aortic arch noted     27 weeks gestation of pregnancy    - s/p TDAP  - s/p Rhogam  - S/p flu shot           Felicita Buchanan MD  Obstetrics  [facility]

## 2018-11-28 NOTE — SUBJECTIVE & OBJECTIVE
Obstetric HPI:    Obstetric History       T0      L0     SAB0   TAB0   Ectopic0   Multiple0   Live Births0       # Outcome Date GA Lbr Axel/2nd Weight Sex Delivery Anes PTL Lv   1 Current                 Past Medical History:   Diagnosis Date    Anemia      History reviewed. No pertinent surgical history.    PTA Medications   Medication Sig    FOLIC ACID, BULK, MISC by Misc.(Non-Drug; Combo Route) route.    PNV,calcium 72-iron-folic acid 27 mg iron- 1 mg Tab Take 1 tablet (1 each total) by mouth once daily.    ranitidine (ZANTAC) 150 MG tablet Take 150 mg by mouth Daily.       Review of patient's allergies indicates:  No Known Allergies     Family History     None        Tobacco Use    Smoking status: Never Smoker    Smokeless tobacco: Never Used   Substance and Sexual Activity    Alcohol use: No    Drug use: No    Sexual activity: Not on file     Review of Systems   Constitutional: Negative for fever.   Respiratory: Negative for cough and shortness of breath.    Cardiovascular: Negative for leg swelling.   Gastrointestinal: Positive for abdominal pain (contractions). Negative for vomiting.   Genitourinary: Negative for vaginal bleeding and vaginal discharge.   Neurological: Negative for headaches.   Hematological: Does not bruise/bleed easily.      Objective:     Vital Signs (Most Recent):  Temp: 97.6 °F (36.4 °C) (18)  Pulse: 70 (18 2240)  Resp: 18 (18)  BP: 105/69 (180)  SpO2: 100 % (18) Vital Signs (24h Range):  Temp:  [97.6 °F (36.4 °C)-98.8 °F (37.1 °C)] 97.6 °F (36.4 °C)  Pulse:  [51-75] 70  Resp:  [18] 18  SpO2:  [99 %-100 %] 100 %  BP: ()/(51-69) 105/69        There is no height or weight on file to calculate BMI.    FHT: 140 Cat 2 moderate BTBV, spontaneous later decelerations noted with return to baseline with maternal repositioning and oxygen. Then able to tolerate contractions without fetal heart tone decelerations. Overall  (reassuring)  TOCO: irregular, 2-10    Physical Exam:   Constitutional: She is oriented to person, place, and time. She appears well-developed and well-nourished. No distress.    HENT:   Head: Normocephalic and atraumatic.      Cardiovascular: Normal rate and regular rhythm.     Pulmonary/Chest: Effort normal.        Abdominal: Soft. She exhibits no distension. There is no tenderness.             Musculoskeletal: Normal range of motion. She exhibits no edema.       Neurological: She is alert and oriented to person, place, and time.    Skin: Skin is warm and dry.    Psychiatric: She has a normal mood and affect. Her behavior is normal. Judgment and thought content normal.       Cervix:  Dilation:  1  Effacement:  60  Station: -3  Presentation: Vertex     Significant Labs:  Lab Results   Component Value Date    GROUPTRH O NEG 11/27/2018    HEPBSAG Negative 08/10/2018       CBC:   Recent Labs   Lab 11/27/18  1830   WBC 13.50*   RBC 3.65*   HGB 11.4*   HCT 33.0*      MCV 90   MCH 31.2*   MCHC 34.5

## 2018-11-28 NOTE — ANESTHESIA PREPROCEDURE EVALUATION
[facility]  Anesthesia Obstetric Pre-Procedure Evaluation         Patient Name: Ximena Robbins  YOB: 1990  MRN: [id number]    SUBJECTIVE:     Pre-operative evaluation for * No procedures listed *     2018    Ximena Robbins is a 28 y.o. female, currently a  at 29w5d. Patient presents w/  labor, endorsing painful contractions. Current pregnancy complicated by questionable VSD and/or aortic arch abnormality vs poor imaging. Patient's  has significant CHD in family. Most recent fetal doppler echo as below:    CONCLUSIONS:  1. Fetal echocardiogram performed to evaluate for question of right aortic arch with  possible malalignment type VSD suspecetd based on MRI and previous Maternal  Fetal Medicine evaluations.  2. Several images suggest but not diagnostic for the presence of a small to moderate  ventricular septal defect with mild malalignment of the ventricular septum.  3. Color flow Doppler does not demonstrate shunt in this area suggesting that this is  simply dropout artifact.  4. Images suggest normally positioned aortic arch.  5. Otherwise normal anatomical connections and function for estimated gestational age.      Lab Results   Component Value Date     2018       OB History    Para Term  AB Living   1             SAB TAB Ectopic Multiple Live Births                  # Outcome Date GA Lbr Axel/2nd Weight Sex Delivery Anes PTL Lv   1 Current                   Patient Active Problem List   Diagnosis    Pregnancy, supervision, normal, first    27 weeks gestation of pregnancy    Fetal cardiac anomaly affecting pregnancy, antepartum    Pregnancy complicated by fetal cerebral ventriculomegaly    Polyhydramnios affecting pregnancy in third trimester    Non-reactive NST (non-stress test)       Review of patient's allergies indicates:  No Known Allergies    Current Medications:   [START ON 2018] betamethasone  acetate-betamethasone sodium phosphate  12 mg Intramuscular Once    [START ON 11/28/2018] prenatal vitamin  1 tablet Oral Daily    promethazine (PHENERGAN) IVPB  12.5 mg Intravenous Once       No current facility-administered medications on file prior to encounter.      Current Outpatient Medications on File Prior to Encounter   Medication Sig Dispense Refill    FOLIC ACID, BULK, MISC by Misc.(Non-Drug; Combo Route) route.      PNV,calcium 72-iron-folic acid 27 mg iron- 1 mg Tab Take 1 tablet (1 each total) by mouth once daily. 30 tablet 11    ranitidine (ZANTAC) 150 MG tablet Take 150 mg by mouth Daily.         History reviewed. No pertinent surgical history.    Social History     Socioeconomic History    Marital status:      Spouse name: Not on file    Number of children: Not on file    Years of education: Not on file    Highest education level: Not on file   Social Needs    Financial resource strain: Not on file    Food insecurity - worry: Not on file    Food insecurity - inability: Not on file    Transportation needs - medical: Not on file    Transportation needs - non-medical: Not on file   Occupational History    Not on file   Tobacco Use    Smoking status: Never Smoker    Smokeless tobacco: Never Used   Substance and Sexual Activity    Alcohol use: No    Drug use: No    Sexual activity: Not on file   Other Topics Concern    Not on file   Social History Narrative    Not on file       OBJECTIVE:     Vital Signs Range (Last 24H):    Temp:  [37.1 °C (98.8 °F)]   Pulse:  [51-59]   BP: ()/(51-66)   SpO2:  [100 %]     BP Readings from Last 3 Encounters:   11/27/18 (!) 92/51   11/27/18 92/60   11/15/18 94/62           Wt Readings from Last 1 Encounters:   11/27/18 0942 58 kg (127 lb 13.9 oz)       CBC:   Lab Results   Component Value Date    WBC 13.50 (H) 11/27/2018    HGB 11.4 (L) 11/27/2018    HCT 33.0 (L) 11/27/2018    MCV 90 11/27/2018     11/27/2018       CMP:      Chemistry        Component Value Date/Time     (L) 08/03/2018 1716    K 3.6 08/03/2018 1716     08/03/2018 1716    CO2 23 08/03/2018 1716    BUN 3 (L) 08/03/2018 1716    CREATININE 0.6 08/03/2018 1716    GLU 70 08/03/2018 1716        Component Value Date/Time    CALCIUM 9.0 08/03/2018 1716    ALKPHOS 45 (L) 08/03/2018 1716    AST 14 08/03/2018 1716    ALT 10 08/03/2018 1716    BILITOT 0.5 08/03/2018 1716    ESTGFRAFRICA >60.0 08/03/2018 1716    EGFRNONAA >60.0 08/03/2018 1716        INR:  No results for input(s): PT, INR, PROTIME, APTT in the last 72 hours.    Diagnostic Studies: No relevant studies.    EKG: No recent studies available.    2D ECHO:  No results found for this or any previous visit.    ASSESSMENT/PLAN:     Anesthesia Evaluation    I have reviewed the Patient Summary Reports.    I have reviewed the Nursing Notes.   I have reviewed the Medications.     Review of Systems  Anesthesia Hx:  No problems with previous Anesthesia  Denies Family Hx of Anesthesia complications.   Denies Personal Hx of Anesthesia complications.   Social:  Non-Smoker, No Alcohol Use    Hematology/Oncology:        Denies Current/Recent Cancer   EENT/Dental:   denies chronic allergic rhinitis   Cardiovascular:   Denies Hypertension.  Denies MI.  Denies CAD.    Denies CABG/stent.  Denies Dysrhythmias.             Pulmonary:   Denies COPD.  Denies Asthma.  Denies Recent URI.  Denies Sleep Apnea.    Renal/:   Denies Chronic Renal Disease.     Hepatic/GI:   Denies GERD. Denies Liver Disease.    Neurological:   Denies TIA. Denies CVA. Denies Seizures.    Endocrine:   Denies Diabetes.    Psych:   Denies Psychiatric History.          Physical Exam  General:  Well nourished    Airway/Jaw/Neck:  Airway Findings: Mouth Opening: Normal Tongue: Normal  General Airway Assessment: Adult  Mallampati: III  Improves to II with phonation.  TM Distance: Normal, at least 6 cm  Jaw/Neck Findings:  Neck ROM: Normal ROM      Dental:  Dental  Findings: In tact   Chest/Lungs:  Chest/Lungs Findings: Clear to auscultation, Normal Respiratory Rate     Heart/Vascular:  Heart Findings: Rate: Normal  Rhythm: Regular Rhythm  Sounds: Normal     Abdomen:  Abdomen Findings:  Normal, Soft, Nontender     Musculoskeletal:  Musculoskeletal Findings: Normal   Skin:  Skin Findings: Normal    Mental Status:  Mental Status Findings:  Cooperative, Alert and Oriented         Anesthesia Plan  Type of Anesthesia, risks & benefits discussed:  Anesthesia Type:  CSE, epidural, general, spinal  Patient's Preference:   Intra-op Monitoring Plan: standard ASA monitors  Intra-op Monitoring Plan Comments:   Post Op Pain Control Plan: multimodal analgesia, IV/PO Opioids PRN, per primary service following discharge from PACU and intrathecal opioid  Post Op Pain Control Plan Comments:   Induction:    Beta Blocker:  Patient is not currently on a Beta-Blocker (No further documentation required).       Informed Consent: Patient understands risks and agrees with Anesthesia plan.  Questions answered. Anesthesia consent signed with patient.  ASA Score: 2     Day of Surgery Review of History & Physical:    H&P update referred to the provider.         Ready For Surgery From Anesthesia Perspective.

## 2018-11-28 NOTE — PROGRESS NOTES
11/28/18 0952   [facility] Angelique Note - Strip   Strip Reviewed by Angelique Nurse? Yes   [facility] Angelique Note - Communication   Hayti Nurse Communicated with Bedside Nurse Regarding: Fetal Status   [facility] Angelique Note - Notification   Nurse Notified? Yes   Name of Nurse ROMULO lazaro  (in pt.'s room)

## 2018-11-28 NOTE — ASSESSMENT & PLAN NOTE
- non reassuring FHTs noted in KEEGAN  - Contraction significantly diminished  - BMZ given > 2nd dose today  - On Magnesium for neuroprotection  - NPO   - continuous monitoring  - Consider De-Escalation with continued improved clinical status  - delivery if continued late decelerations   - discussed with MFM   - No tocolytics if no cervical change

## 2018-11-28 NOTE — HPI
Ximena Robbins is a 28 y.o. F at 29w3d presents complaining of abdominal pain for the past couple of days. This IUP is complicated by Rh negative status, fetal anomalies ( including right aortic arch, polyhydramnios and small stomach,) parental consanguity, and language barrier.  Patient reports contractions, denies vaginal bleeding, denies LOF.   Fetal Movement: normal.

## 2018-11-28 NOTE — ASSESSMENT & PLAN NOTE
- non reassuring FHTs noted in KEEGAN  - continues to have mildly painful contractions   - BMZ given  - started on Magnesium 6 load for neuroprotection  - NPO   - continuous monitoring  - delivery if continued late decelerations   - discussed with MFM   - No tocolytics if no cervical change

## 2018-11-28 NOTE — HOSPITAL COURSE
"11/27/2018 Admitted for observation 2/2 to contractions at 29 w3d. Cvx 1/60/-3, FFN unable to be checked 2/2 to recent intercourse. Noted FHT decelerations with contractions in KEEGAN. Maternal resuscitation able to be performed. BMZ given, 6g Mg load started. Admitted for continuous observation.   11/28/2018 - Patient stable overnight. Contractions noted to have "spaced out." Continued on Mag Sulfate for fetal neuro-protection. Patient with one prolonged decel this AM with spontaneous resolution. Thus remained on Magnesium and continuous throughout the day.  Additional monitoring was reassuring and the patient was moved downstairs. After additional reassuring monitoring the Mag was discontinued and patient was given a diet.   11/29/2018 - No acute events overnight. Patient without complaints this AM.    "

## 2018-11-28 NOTE — SUBJECTIVE & OBJECTIVE
Interval History:  Patient denies complaints on AM rounds. Had some contractions around 4:00 the patient states.  Endorses good fetal movements. No vaginal bleeding or leaking fluids.       Objective:     Vital Signs (Most Recent):  Temp: 98.2 °F (36.8 °C) (11/28/18 0420)  Pulse: 73 (11/28/18 0640)  Resp: 18 (11/28/18 0420)  BP: (!) 85/50 (11/28/18 0640)  SpO2: 97 % (11/28/18 0640) Vital Signs (24h Range):  Temp:  [97.6 °F (36.4 °C)-98.8 °F (37.1 °C)] 98.2 °F (36.8 °C)  Pulse:  [51-92] 73  Resp:  [18] 18  SpO2:  [97 %-100 %] 97 %  BP: ()/(48-69) 85/50        There is no height or weight on file to calculate BMI.    FHT: 120, moderate variability, accels present, no decells, Category 1 - Reassuring  TOCO: Rare      Intake/Output Summary (Last 24 hours) at 11/28/2018 0658  Last data filed at 11/28/2018 0600  Gross per 24 hour   Intake 1455.01 ml   Output 1280 ml   Net 175.01 ml       Cervical Exam:  Dilation:  1  Effacement:  60%  Station: -3  Presentation: Vertex     Significant Labs:  Recent Lab Results       11/27/18  1830   11/27/18  1250        Antibody Identification   POS  Comment:  Rhogam Antibody     Baso # 0.02 0.01     Basophil% 0.1 0.1     Differential Method Automated Automated     Eos # 0.0 0.1     Eosinophil% 0.2 0.5     Gran # (ANC) 10.7 11.5     Gran% 79.3 78.8     Group & Rh   O NEG     Hematocrit 33.0 34.1     Hemoglobin 11.4 11.5     INDIRECT LESLIE   POS     Lymph # 2.1 2.3     Lymph% 15.4 15.8     MCH 31.2 31.2     MCHC 34.5 33.7     MCV 90 92     Mono # 0.6 0.6     Mono% 4.6 4.2     MPV 10.3 10.5     OB Glucose Screen   65     Platelets 268 282     RBC 3.65 3.69     RDW 12.5 12.6     WBC 13.50 14.65           Physical Exam:   Constitutional: She is oriented to person, place, and time. She appears well-developed and well-nourished. No distress.   Patient appears to be in no acute distress or pain.    HENT:   Head: Normocephalic and atraumatic.    Eyes: Conjunctivae are normal.    Neck: Neck  supple.    Cardiovascular: Normal rate, regular rhythm and intact distal pulses.     Pulmonary/Chest: Effort normal. No respiratory distress.        Abdominal: Soft. She exhibits no distension and no abdominal incision. There is no tenderness. There is no rebound and no guarding.                 Neurological: She is alert and oriented to person, place, and time.    Skin: Skin is warm and dry. She is not diaphoretic.    Psychiatric: She has a normal mood and affect. Her behavior is normal. Judgment and thought content normal.

## 2018-11-28 NOTE — H&P
[facility]  Obstetrics  History & Physical    Patient Name: Ximena Robbins  MRN: [id number]  Admission Date: 2018  Primary Care Provider: Primary Doctor No    Subjective:     Principal Problem:Non-reactive NST (non-stress test)    History of Present Illness:  Ximena Robbins is a 28 y.o. F at 29w3d presents complaining of abdominal pain for the past couple of days. This IUP is complicated by Rh negative status, fetal anomalies ( including right aortic arch, polyhydramnios and small stomach,) parental consanguity, and language barrier.  Patient reports contractions, denies vaginal bleeding, denies LOF.   Fetal Movement: normal.       Obstetric HPI:    Obstetric History       T0      L0     SAB0   TAB0   Ectopic0   Multiple0   Live Births0       # Outcome Date GA Lbr Axel/2nd Weight Sex Delivery Anes PTL Lv   1 Current                 Past Medical History:   Diagnosis Date    Anemia      History reviewed. No pertinent surgical history.    PTA Medications   Medication Sig    FOLIC ACID, BULK, MISC by Misc.(Non-Drug; Combo Route) route.    PNV,calcium 72-iron-folic acid 27 mg iron- 1 mg Tab Take 1 tablet (1 each total) by mouth once daily.    ranitidine (ZANTAC) 150 MG tablet Take 150 mg by mouth Daily.       Review of patient's allergies indicates:  No Known Allergies     Family History     None        Tobacco Use    Smoking status: Never Smoker    Smokeless tobacco: Never Used   Substance and Sexual Activity    Alcohol use: No    Drug use: No    Sexual activity: Not on file     Review of Systems   Constitutional: Negative for fever.   Respiratory: Negative for cough and shortness of breath.    Cardiovascular: Negative for leg swelling.   Gastrointestinal: Positive for abdominal pain (contractions). Negative for vomiting.   Genitourinary: Negative for vaginal bleeding and vaginal discharge.   Neurological: Negative for headaches.   Hematological: Does not bruise/bleed  easily.      Objective:     Vital Signs (Most Recent):  Temp: 97.6 °F (36.4 °C) (18)  Pulse: 70 (18 2240)  Resp: 18 (18)  BP: 105/69 (180)  SpO2: 100 % (18) Vital Signs (24h Range):  Temp:  [97.6 °F (36.4 °C)-98.8 °F (37.1 °C)] 97.6 °F (36.4 °C)  Pulse:  [51-75] 70  Resp:  [18] 18  SpO2:  [99 %-100 %] 100 %  BP: ()/(51-69) 105/69        There is no height or weight on file to calculate BMI.    FHT: 140 Cat 2 moderate BTBV, spontaneous later decelerations noted with return to baseline with maternal repositioning and oxygen. Then able to tolerate contractions without fetal heart tone decelerations. Overall (reassuring)  TOCO: irregular, 2-10    Physical Exam:   Constitutional: She is oriented to person, place, and time. She appears well-developed and well-nourished. No distress.    HENT:   Head: Normocephalic and atraumatic.      Cardiovascular: Normal rate and regular rhythm.     Pulmonary/Chest: Effort normal.        Abdominal: Soft. She exhibits no distension. There is no tenderness.             Musculoskeletal: Normal range of motion. She exhibits no edema.       Neurological: She is alert and oriented to person, place, and time.    Skin: Skin is warm and dry.    Psychiatric: She has a normal mood and affect. Her behavior is normal. Judgment and thought content normal.       Cervix:  Dilation:  1  Effacement:  60  Station: -3  Presentation: Vertex     Significant Labs:  Lab Results   Component Value Date    GROUPTRH O NEG 2018    HEPBSAG Negative 08/10/2018       CBC:   Recent Labs   Lab 18  1830   WBC 13.50*   RBC 3.65*   HGB 11.4*   HCT 33.0*      MCV 90   MCH 31.2*   MCHC 34.5     Assessment/Plan:     28 y.o. female  at 29w3d for:    * Non-reactive NST (non-stress test)    - non reassuring FHTs noted in KEEGAN  - continues to have mildly painful contractions   - BMZ given  - started on Magnesium 6 load for neuroprotection  - NPO   -  continuous monitoring  - delivery if continued late decelerations   - discussed with MFM   - No tocolytics if no cervical change   - consent signed  - Vertex on US  - Used Language Line ID number [id number] to consent and explain treatment plan      Polyhydramnios affecting pregnancy in third trimester    - noted on last MFM US   - concern for esophageal atresia with small stomach noted      Fetal cardiac anomaly affecting pregnancy, antepartum    - right aortic arch noted     27 weeks gestation of pregnancy    - s/p TDAP  - s/p Rhogam  - S/p flu shot         Anabela Adam MD  Obstetrics  [facility]

## 2018-11-28 NOTE — PROGRESS NOTES
MAG NOTE     Ximena Robbins is a 28 y.o. female @ 29w4 on Magneisum sulfate for neuroprotection.     Patient denies headaches, denies blurry vision and denies right upper quadrant pain. denies CP, denies SOB. Patient reports no nausea/no vomiting.     Objective:       BP (!) 95/51   Pulse 68   Temp 98.1 °F (36.7 °C) (Oral)   Resp 18   LMP 03/28/2018 (LMP Unknown)   SpO2 99%   Breastfeeding? No   Vitals:    11/28/18 0340 11/28/18 0350 11/28/18 0400 11/28/18 0410   BP: (!) 95/51      Pulse: 66 64 66 68   Resp:       Temp:       TempSrc:       SpO2: 100% 99% 99% 99%         No intake/output data recorded.    I/O this shift:  In: 1183.3 [I.V.:1133.3; IV Piggyback:50]  Out: 1180 [Urine:1180]           General:   alert, appears stated age and cooperative   Lungs:   clear to auscultation bilaterally   Heart:   regular rate and rhythm, S1, S2 normal, no murmur, click, rub or gallop   Abdomen:  soft, non-tender; bowel sounds normal; no masses,  no organomegaly   Uterine Size:  soft located at the umblicus.    Extremities: peripheral pulses normal, no pedal edema, no clubbing or cyanosis   Reflexes - 2+  bilaterally     FHTs: 125, Cat 2, reassuring    Lab Review  Recent Results (from the past 48 hour(s))   OB Glucose Screen    Collection Time: 11/27/18 12:50 PM   Result Value Ref Range    OB Glucose Screen 65 (L) 70 - 140 mg/dL   CBC auto differential    Collection Time: 11/27/18 12:50 PM   Result Value Ref Range    WBC 14.65 (H) 3.90 - 12.70 K/uL    RBC 3.69 (L) 4.00 - 5.40 M/uL    Hemoglobin 11.5 (L) 12.0 - 16.0 g/dL    Hematocrit 34.1 (L) 37.0 - 48.5 %    MCV 92 82 - 98 fL    MCH 31.2 (H) 27.0 - 31.0 pg    MCHC 33.7 32.0 - 36.0 g/dL    RDW 12.6 11.5 - 14.5 %    Platelets 282 150 - 350 K/uL    MPV 10.5 9.2 - 12.9 fL    Gran # (ANC) 11.5 (H) 1.8 - 7.7 K/uL    Lymph # 2.3 1.0 - 4.8 K/uL    Mono # 0.6 0.3 - 1.0 K/uL    Eos # 0.1 0.0 - 0.5 K/uL    Baso # 0.01 0.00 - 0.20 K/uL    Gran% 78.8 (H) 38.0 - 73.0 %    Lymph%  15.8 (L) 18.0 - 48.0 %    Mono% 4.2 4.0 - 15.0 %    Eosinophil% 0.5 0.0 - 8.0 %    Basophil% 0.1 0.0 - 1.9 %    Differential Method Automated    Type & Screen    Collection Time: 18 12:50 PM   Result Value Ref Range    Group & Rh O NEG     Indirect Karol POS    Antibody identification    Collection Time: 18 12:50 PM   Result Value Ref Range    Antibody Identification POS    CBC auto differential    Collection Time: 18  6:30 PM   Result Value Ref Range    WBC 13.50 (H) 3.90 - 12.70 K/uL    RBC 3.65 (L) 4.00 - 5.40 M/uL    Hemoglobin 11.4 (L) 12.0 - 16.0 g/dL    Hematocrit 33.0 (L) 37.0 - 48.5 %    MCV 90 82 - 98 fL    MCH 31.2 (H) 27.0 - 31.0 pg    MCHC 34.5 32.0 - 36.0 g/dL    RDW 12.5 11.5 - 14.5 %    Platelets 268 150 - 350 K/uL    MPV 10.3 9.2 - 12.9 fL    Gran # (ANC) 10.7 (H) 1.8 - 7.7 K/uL    Lymph # 2.1 1.0 - 4.8 K/uL    Mono # 0.6 0.3 - 1.0 K/uL    Eos # 0.0 0.0 - 0.5 K/uL    Baso # 0.02 0.00 - 0.20 K/uL    Gran% 79.3 (H) 38.0 - 73.0 %    Lymph% 15.4 (L) 18.0 - 48.0 %    Mono% 4.6 4.0 - 15.0 %    Eosinophil% 0.2 0.0 - 8.0 %    Basophil% 0.1 0.0 - 1.9 %    Differential Method Automated           Assessment:     28 y.o.  female , on magnesium sulfate    Active Hospital Problems    Diagnosis  POA    *Non-reactive NST (non-stress test) [O28.8]  Yes    27 weeks gestation of pregnancy [Z3A.27]  Not Applicable    Polyhydramnios affecting pregnancy in third trimester [O40.3XX0]  Yes    Fetal cardiac anomaly affecting pregnancy, antepartum [O35.8XX0]  Yes      Resolved Hospital Problems   No resolved problems to display.        Plan:   1. Continue magnesium sulfate, no signs of toxicity at this time  2. Close maternal monitoring including UOP and BP  3. Recheck in 2-4 hours or PRN

## 2018-11-28 NOTE — ED PROVIDER NOTES
Encounter Date: 2018       History     Chief Complaint   Patient presents with    Abdominal Pain     Ximena Robbins is a 28 y.o. F at 29w3d presents complaining of abdominal pain for the past couple of days. This IUP is complicated by Rh negative status, fetal anomalies ( including right aortic arch, polyhydramnios and small stomach,) parental consanguity, and language barrier.  Patient reports contractions, denies vaginal bleeding, denies LOF.   Fetal Movement: normal.              Review of patient's allergies indicates:  No Known Allergies  Past Medical History:   Diagnosis Date    Anemia      No past surgical history on file.  Family History   Problem Relation Age of Onset    Cardiomyopathy Neg Hx     Arrhythmia Neg Hx     Congenital heart disease Neg Hx     Early death Neg Hx     Heart attacks under age 50 Neg Hx     Pacemaker/defibrilator Neg Hx      Social History     Tobacco Use    Smoking status: Never Smoker    Smokeless tobacco: Never Used   Substance Use Topics    Alcohol use: No    Drug use: No     Review of Systems   Constitutional: Negative for chills, fatigue and fever.   HENT: Negative for congestion.    Eyes: Negative for visual disturbance.   Respiratory: Negative for cough and shortness of breath.    Cardiovascular: Negative for chest pain and palpitations.   Gastrointestinal: Positive for abdominal pain. Negative for abdominal distention, constipation, diarrhea, nausea and vomiting.   Genitourinary: Negative for difficulty urinating, dysuria, hematuria, vaginal bleeding and vaginal discharge.   Skin: Negative for rash.   Neurological: Negative for dizziness, seizures, light-headedness and headaches.   Hematological: Does not bruise/bleed easily.   Psychiatric/Behavioral: Negative for dysphoric mood. The patient is not nervous/anxious.        Physical Exam     Initial Vitals   BP Pulse Resp Temp SpO2   18 1747 18 1747 -- 18 1746 18 1805   108/66 (!)  59  98.8 °F (37.1 °C) 100 %      MAP       --                Physical Exam    Vitals reviewed.  Constitutional: She appears well-developed and well-nourished.   Cardiovascular: Normal rate.   Pulmonary/Chest: No respiratory distress.   Abdominal: Soft. She exhibits no distension. There is no tenderness.   Size>dates   Musculoskeletal: She exhibits no edema.   Neurological: She is alert and oriented to person, place, and time.   Skin: Skin is warm and dry.   Psychiatric: She has a normal mood and affect. Her behavior is normal. Judgment and thought content normal.     OB LABOR EXAM:   Pre-Term Labor: No.   Membranes ruptured: No.   Method: Sterile vaginal exam per MD.   Vaginal Bleeding: none present.     Dilatation: 1.   Station: -3.   Effacement: 60%.             ED Course   Obtain Fetal nonstress test (NST)  Date/Time: 11/28/2018 12:43 AM  Performed by: Anabela Adam MD  Authorized by: Anabela Adam MD     Nonstress Test:     Variability:  6-25 BPM    Decelerations:  Late    Accelerations:  10 bpm    Baseline:  135    Uterine Irritability: No      Contractions:  Irregular  Biophysical Profile:     Nonstress Test Interpretation: reactive      Overall Impression:  Non - reassuring      Labs Reviewed   CBC W/ AUTO DIFFERENTIAL - Abnormal; Notable for the following components:       Result Value    WBC 13.50 (*)     RBC 3.65 (*)     Hemoglobin 11.4 (*)     Hematocrit 33.0 (*)     MCH 31.2 (*)     Gran # (ANC) 10.7 (*)     Gran% 79.3 (*)     Lymph% 15.4 (*)     All other components within normal limits   RPR   HIV 1 / 2 ANTIBODY   TYPE & SCREEN          Imaging Results    None          Medical Decision Making:   ED Management:  * Non-reactive NST (non-stress test)    - non reassuring FHTs noted in KEEGAN  - continues to have mildly painful contractions   - BMZ given  - started on Magnesium 6 load for neuroprotection  - NPO   - continuous monitoring  - delivery if continued late decelerations   - discussed with RYAN    - No tocolytics if no cervical change   - consent signed  - Vertex on US  - Used Language Line ID number [id number] to consent and explain treatment plan      Polyhydramnios affecting pregnancy in third trimester    - noted on last MFM US   - concern for esophageal atresia with small stomach noted      Fetal cardiac anomaly affecting pregnancy, antepartum    - right aortic arch noted     27 weeks gestation of pregnancy    - s/p TDAP  - s/p Rhogam  - S/p flu shot                         ED Course as of Nov 28 0042   Tue Nov 27, 2018   1904 Patient with multiple decels on fetal monitoring in KEEGAN. D/W MFM - admit to L&D for BMZ, Mg and continuous monitoring  [LB]      ED Course User Index  [LB] Rhonda Elizabeth MD     Clinical Impression:   The encounter diagnosis was Non-reactive NST (non-stress test).

## 2018-11-29 LAB
ALLENS TEST: ABNORMAL
ALLENS TEST: ABNORMAL
BACTERIA UR CULT: NORMAL
BACTERIA UR CULT: NORMAL
DELSYS: ABNORMAL
DELSYS: ABNORMAL
HCO3 UR-SCNC: 20 MMOL/L (ref 24–28)
HCO3 UR-SCNC: 21.3 MMOL/L (ref 24–28)
PCO2 BLDA: 42.1 MMHG (ref 35–45)
PCO2 BLDA: 46.3 MMHG (ref 35–45)
PH SMN: 7.27 [PH] (ref 7.35–7.45)
PH SMN: 7.29 [PH] (ref 7.35–7.45)
PO2 BLDA: 13 MMHG (ref 80–100)
PO2 BLDA: 13 MMHG (ref 80–100)
POC BE: -6 MMOL/L
POC BE: -7 MMOL/L
POC SATURATED O2: 12 % (ref 95–100)
POC SATURATED O2: 13 % (ref 95–100)
SAMPLE: ABNORMAL
SAMPLE: ABNORMAL
SITE: ABNORMAL
SITE: ABNORMAL

## 2018-11-29 PROCEDURE — 63600175 PHARM REV CODE 636 W HCPCS: Performed by: STUDENT IN AN ORGANIZED HEALTH CARE EDUCATION/TRAINING PROGRAM

## 2018-11-29 PROCEDURE — 99900035 HC TECH TIME PER 15 MIN (STAT)

## 2018-11-29 PROCEDURE — 25000003 PHARM REV CODE 250: Performed by: STUDENT IN AN ORGANIZED HEALTH CARE EDUCATION/TRAINING PROGRAM

## 2018-11-29 PROCEDURE — 88307 TISSUE EXAM BY PATHOLOGIST: CPT | Mod: 26,,, | Performed by: PATHOLOGY

## 2018-11-29 PROCEDURE — 36000685 HC CESAREAN SECTION LEVEL I

## 2018-11-29 PROCEDURE — 11000001 HC ACUTE MED/SURG PRIVATE ROOM

## 2018-11-29 PROCEDURE — 76819 FETAL BIOPHYS PROFIL W/O NST: CPT | Mod: 26,,, | Performed by: PEDIATRICS

## 2018-11-29 PROCEDURE — 0502F SUBSEQUENT PRENATAL CARE: CPT | Mod: ,,, | Performed by: OBSTETRICS & GYNECOLOGY

## 2018-11-29 PROCEDURE — 82803 BLOOD GASES ANY COMBINATION: CPT

## 2018-11-29 PROCEDURE — 76827 ECHO EXAM OF FETAL HEART: CPT | Mod: 26,,, | Performed by: PEDIATRICS

## 2018-11-29 PROCEDURE — 37000008 HC ANESTHESIA 1ST 15 MINUTES: Performed by: OBSTETRICS & GYNECOLOGY

## 2018-11-29 PROCEDURE — 88307 TISSUE EXAM BY PATHOLOGIST: CPT | Performed by: PATHOLOGY

## 2018-11-29 PROCEDURE — 59510 CESAREAN DELIVERY: CPT | Mod: ,,, | Performed by: ANESTHESIOLOGY

## 2018-11-29 PROCEDURE — 51702 INSERT TEMP BLADDER CATH: CPT

## 2018-11-29 PROCEDURE — 76816 OB US FOLLOW-UP PER FETUS: CPT | Mod: 26,,, | Performed by: PEDIATRICS

## 2018-11-29 PROCEDURE — 27200688 HC TRAY, SPINAL-HYPER/ ISOBARIC: Performed by: STUDENT IN AN ORGANIZED HEALTH CARE EDUCATION/TRAINING PROGRAM

## 2018-11-29 PROCEDURE — 37000009 HC ANESTHESIA EA ADD 15 MINS: Performed by: OBSTETRICS & GYNECOLOGY

## 2018-11-29 PROCEDURE — 59510 CESAREAN DELIVERY: CPT | Mod: ,,, | Performed by: OBSTETRICS & GYNECOLOGY

## 2018-11-29 RX ORDER — OXYTOCIN/RINGER'S LACTATE 20/1000 ML
333 PLASTIC BAG, INJECTION (ML) INTRAVENOUS CONTINUOUS
Status: ACTIVE | OUTPATIENT
Start: 2018-11-29 | End: 2018-11-29

## 2018-11-29 RX ORDER — PHENYLEPHRINE HYDROCHLORIDE 10 MG/ML
INJECTION INTRAVENOUS
Status: DISCONTINUED | OUTPATIENT
Start: 2018-11-29 | End: 2018-11-29

## 2018-11-29 RX ORDER — KETOROLAC TROMETHAMINE 30 MG/ML
30 INJECTION, SOLUTION INTRAMUSCULAR; INTRAVENOUS EVERY 6 HOURS
Status: COMPLETED | OUTPATIENT
Start: 2018-11-29 | End: 2018-11-30

## 2018-11-29 RX ORDER — SODIUM CITRATE AND CITRIC ACID MONOHYDRATE 334; 500 MG/5ML; MG/5ML
30 SOLUTION ORAL
Status: DISCONTINUED | OUTPATIENT
Start: 2018-11-29 | End: 2018-11-30

## 2018-11-29 RX ORDER — FENTANYL CITRATE 50 UG/ML
INJECTION, SOLUTION INTRAMUSCULAR; INTRAVENOUS
Status: DISCONTINUED | OUTPATIENT
Start: 2018-11-29 | End: 2018-11-29

## 2018-11-29 RX ORDER — METHYLERGONOVINE MALEATE 0.2 MG/ML
200 INJECTION INTRAVENOUS
Status: DISCONTINUED | OUTPATIENT
Start: 2018-11-29 | End: 2018-12-01 | Stop reason: HOSPADM

## 2018-11-29 RX ORDER — KETOROLAC TROMETHAMINE 30 MG/ML
INJECTION, SOLUTION INTRAMUSCULAR; INTRAVENOUS
Status: DISCONTINUED | OUTPATIENT
Start: 2018-11-29 | End: 2018-11-29

## 2018-11-29 RX ORDER — OXYTOCIN/RINGER'S LACTATE 20/1000 ML
41.7 PLASTIC BAG, INJECTION (ML) INTRAVENOUS CONTINUOUS
Status: ACTIVE | OUTPATIENT
Start: 2018-11-29 | End: 2018-11-29

## 2018-11-29 RX ORDER — ACETAMINOPHEN 10 MG/ML
INJECTION, SOLUTION INTRAVENOUS
Status: DISCONTINUED | OUTPATIENT
Start: 2018-11-29 | End: 2018-11-29

## 2018-11-29 RX ORDER — SODIUM CHLORIDE, SODIUM LACTATE, POTASSIUM CHLORIDE, CALCIUM CHLORIDE 600; 310; 30; 20 MG/100ML; MG/100ML; MG/100ML; MG/100ML
INJECTION, SOLUTION INTRAVENOUS CONTINUOUS PRN
Status: DISCONTINUED | OUTPATIENT
Start: 2018-11-29 | End: 2018-11-29

## 2018-11-29 RX ORDER — ACETAMINOPHEN 325 MG/1
650 TABLET ORAL EVERY 6 HOURS
Status: DISPENSED | OUTPATIENT
Start: 2018-11-29 | End: 2018-11-30

## 2018-11-29 RX ORDER — BUPIVACAINE HYDROCHLORIDE 7.5 MG/ML
INJECTION, SOLUTION INTRASPINAL
Status: DISCONTINUED | OUTPATIENT
Start: 2018-11-29 | End: 2018-11-29

## 2018-11-29 RX ORDER — MISOPROSTOL 200 UG/1
800 TABLET ORAL
Status: DISCONTINUED | OUTPATIENT
Start: 2018-11-29 | End: 2018-12-01 | Stop reason: HOSPADM

## 2018-11-29 RX ORDER — OXYTOCIN 10 [USP'U]/ML
INJECTION, SOLUTION INTRAMUSCULAR; INTRAVENOUS
Status: DISCONTINUED | OUTPATIENT
Start: 2018-11-29 | End: 2018-11-29

## 2018-11-29 RX ORDER — CARBOPROST TROMETHAMINE 250 UG/ML
250 INJECTION, SOLUTION INTRAMUSCULAR
Status: DISCONTINUED | OUTPATIENT
Start: 2018-11-29 | End: 2018-12-01 | Stop reason: HOSPADM

## 2018-11-29 RX ORDER — ONDANSETRON 2 MG/ML
INJECTION INTRAMUSCULAR; INTRAVENOUS
Status: DISCONTINUED | OUTPATIENT
Start: 2018-11-29 | End: 2018-11-29

## 2018-11-29 RX ORDER — MUPIROCIN 20 MG/G
1 OINTMENT TOPICAL 2 TIMES DAILY
Status: CANCELLED | OUTPATIENT
Start: 2018-11-29 | End: 2018-12-04

## 2018-11-29 RX ORDER — CEFAZOLIN SODIUM 1 G/3ML
INJECTION, POWDER, FOR SOLUTION INTRAMUSCULAR; INTRAVENOUS
Status: DISCONTINUED | OUTPATIENT
Start: 2018-11-29 | End: 2018-11-29

## 2018-11-29 RX ORDER — OXYCODONE HYDROCHLORIDE 5 MG/1
10 TABLET ORAL EVERY 4 HOURS PRN
Status: DISPENSED | OUTPATIENT
Start: 2018-11-29 | End: 2018-11-30

## 2018-11-29 RX ORDER — ONDANSETRON 2 MG/ML
4 INJECTION INTRAMUSCULAR; INTRAVENOUS EVERY 6 HOURS PRN
Status: DISCONTINUED | OUTPATIENT
Start: 2018-11-29 | End: 2018-12-01 | Stop reason: HOSPADM

## 2018-11-29 RX ORDER — SODIUM CHLORIDE, SODIUM LACTATE, POTASSIUM CHLORIDE, CALCIUM CHLORIDE 600; 310; 30; 20 MG/100ML; MG/100ML; MG/100ML; MG/100ML
INJECTION, SOLUTION INTRAVENOUS CONTINUOUS
Status: DISCONTINUED | OUTPATIENT
Start: 2018-11-29 | End: 2018-11-30

## 2018-11-29 RX ORDER — MUPIROCIN 20 MG/G
OINTMENT TOPICAL
Status: CANCELLED | OUTPATIENT
Start: 2018-11-29

## 2018-11-29 RX ORDER — METOCLOPRAMIDE HYDROCHLORIDE 5 MG/ML
10 INJECTION INTRAMUSCULAR; INTRAVENOUS
Status: DISCONTINUED | OUTPATIENT
Start: 2018-11-29 | End: 2018-11-30

## 2018-11-29 RX ORDER — FAMOTIDINE 10 MG/ML
20 INJECTION INTRAVENOUS
Status: DISCONTINUED | OUTPATIENT
Start: 2018-11-29 | End: 2018-11-30

## 2018-11-29 RX ORDER — CEFAZOLIN SODIUM 2 G/50ML
2 SOLUTION INTRAVENOUS
Status: DISCONTINUED | OUTPATIENT
Start: 2018-11-29 | End: 2018-11-30

## 2018-11-29 RX ORDER — MORPHINE SULFATE 0.5 MG/ML
INJECTION, SOLUTION EPIDURAL; INTRATHECAL; INTRAVENOUS
Status: DISCONTINUED | OUTPATIENT
Start: 2018-11-29 | End: 2018-11-29

## 2018-11-29 RX ORDER — OXYCODONE HYDROCHLORIDE 5 MG/1
5 TABLET ORAL EVERY 4 HOURS PRN
Status: DISPENSED | OUTPATIENT
Start: 2018-11-29 | End: 2018-11-30

## 2018-11-29 RX ORDER — EPHEDRINE SULFATE 50 MG/ML
INJECTION, SOLUTION INTRAVENOUS
Status: DISCONTINUED | OUTPATIENT
Start: 2018-11-29 | End: 2018-11-29

## 2018-11-29 RX ADMIN — EPHEDRINE SULFATE 5 MG: 50 INJECTION INTRAMUSCULAR; INTRAVENOUS; SUBCUTANEOUS at 12:11

## 2018-11-29 RX ADMIN — OXYTOCIN 2 UNITS: 10 INJECTION, SOLUTION INTRAMUSCULAR; INTRAVENOUS at 12:11

## 2018-11-29 RX ADMIN — ACETAMINOPHEN 650 MG: 325 TABLET ORAL at 11:11

## 2018-11-29 RX ADMIN — Medication 0.15 MG: at 12:11

## 2018-11-29 RX ADMIN — BUPIVACAINE HYDROCHLORIDE IN DEXTROSE 1.6 ML: 7.5 INJECTION, SOLUTION SUBARACHNOID at 12:11

## 2018-11-29 RX ADMIN — PHENYLEPHRINE HYDROCHLORIDE 100 MCG: 10 INJECTION INTRAVENOUS at 12:11

## 2018-11-29 RX ADMIN — PRENATAL VIT W/ FE FUMARATE-FA TAB 27-0.8 MG 1 TABLET: 27-0.8 TAB at 09:11

## 2018-11-29 RX ADMIN — ACETAMINOPHEN 650 MG: 325 TABLET ORAL at 06:11

## 2018-11-29 RX ADMIN — PANTOPRAZOLE SODIUM 40 MG: 40 TABLET, DELAYED RELEASE ORAL at 09:11

## 2018-11-29 RX ADMIN — SODIUM CHLORIDE, SODIUM LACTATE, POTASSIUM CHLORIDE, AND CALCIUM CHLORIDE: .6; .31; .03; .02 INJECTION, SOLUTION INTRAVENOUS at 12:11

## 2018-11-29 RX ADMIN — KETOROLAC TROMETHAMINE 30 MG: 30 INJECTION, SOLUTION INTRAMUSCULAR; INTRAVENOUS at 12:11

## 2018-11-29 RX ADMIN — OXYCODONE HYDROCHLORIDE 5 MG: 5 TABLET ORAL at 05:11

## 2018-11-29 RX ADMIN — ONDANSETRON 4 MG: 2 INJECTION INTRAMUSCULAR; INTRAVENOUS at 12:11

## 2018-11-29 RX ADMIN — PHENYLEPHRINE HYDROCHLORIDE 200 MCG: 10 INJECTION INTRAVENOUS at 12:11

## 2018-11-29 RX ADMIN — KETOROLAC TROMETHAMINE 30 MG: 30 INJECTION, SOLUTION INTRAMUSCULAR at 11:11

## 2018-11-29 RX ADMIN — SODIUM CHLORIDE, SODIUM LACTATE, POTASSIUM CHLORIDE, AND CALCIUM CHLORIDE: .6; .31; .03; .02 INJECTION, SOLUTION INTRAVENOUS at 11:11

## 2018-11-29 RX ADMIN — KETOROLAC TROMETHAMINE 30 MG: 30 INJECTION, SOLUTION INTRAMUSCULAR at 06:11

## 2018-11-29 RX ADMIN — FENTANYL CITRATE 10 MCG: 50 INJECTION, SOLUTION INTRAMUSCULAR; INTRAVENOUS at 12:11

## 2018-11-29 RX ADMIN — ACETAMINOPHEN 1000 MG: 10 INJECTION, SOLUTION INTRAVENOUS at 12:11

## 2018-11-29 RX ADMIN — OXYCODONE HYDROCHLORIDE 5 MG: 5 TABLET ORAL at 09:11

## 2018-11-29 RX ADMIN — CEFAZOLIN 2 G: 330 INJECTION, POWDER, FOR SOLUTION INTRAMUSCULAR; INTRAVENOUS at 12:11

## 2018-11-29 NOTE — ASSESSMENT & PLAN NOTE
- non reassuring FHTs noted in KEEGAN  - Rare and intermittent contractions  - S/P 2nd dose of BMZ  - S/P Magnesium for neuroprotection  - Reg Diet  - continuous monitoring > Consider de-escalation this AM  - delivery if continued late decelerations   - MFM scan this AM   - No tocolytics if no cervical change

## 2018-11-29 NOTE — TRANSFER OF CARE
"Anesthesia Transfer of Care Note    Patient: Ximena Robbins    Procedure(s) Performed: Procedure(s) (LRB):   SECTION (N/A)    Patient location: Labor and Delivery    Anesthesia Type: spinal    Transport from OR: Transported from OR on room air with adequate spontaneous ventilation    Post pain: adequate analgesia    Post assessment: no apparent anesthetic complications    Post vital signs: stable    Level of consciousness: awake and alert    Nausea/Vomiting: no nausea/vomiting    Complications: none    Transfer of care protocol was followed      Last vitals:   Visit Vitals  BP (!) 100/59 (BP Location: Left arm, Patient Position: Sitting)   Pulse 60   Temp 36.1 °C (97 °F) (Temporal)   Resp 16   Ht 5' 2" (1.575 m)   Wt 57.6 kg (127 lb)   LMP 2018 (LMP Unknown)   SpO2 (!) 93%   Breastfeeding? Unknown   BMI 23.23 kg/m²     "

## 2018-11-29 NOTE — ANESTHESIA PROCEDURE NOTES
ANESTHESIA BLOCK SPINAL    Diagnosis: IUP  Patient location during procedure: OR  Start time: 11/29/2018 11:55 AM  Timeout: 11/29/2018 11:55 AM  End time: 11/29/2018 12:01 PM  Staffing  Anesthesiologist: Ivonne Dinero MD  Resident/CRNA: Andrew Rodriguez MD  Performed: resident/CRNA   Preanesthetic Checklist  Completed: patient identified, surgical consent, pre-op evaluation, timeout performed, IV checked, risks and benefits discussed and monitors and equipment checked  Spinal Block  Patient position: sitting  Prep: ChloraPrep  Patient monitoring: heart rate, cardiac monitor and continuous pulse ox  Approach: midline  Location: L4-5  Injection technique: single shot  CSF Fluid: clear free-flowing CSF  Needle  Needle type: pencil-tip   Needle gauge: 25 G  Needle length: 3.5 in  Additional Documentation: negative aspiration for heme  Needle localization: anatomical landmarks  Assessment  Sensory level: T4   Dermatomal levels determined by pinch or prick  Ease of block: easy  Patient's tolerance of the procedure: comfortable throughout block

## 2018-11-29 NOTE — PLAN OF CARE
11/29/18 1036   Discharge Assessment   Assessment Type Discharge Planning Assessment   Confirmed/corrected address and phone number on facesheet? Yes   Assessment information obtained from? Patient   Prior to hospitilization cognitive status: Alert/Oriented   Prior to hospitalization functional status: Independent   Current cognitive status: Alert/Oriented   Current Functional Status: Independent   Lives With spouse   Able to Return to Prior Arrangements yes   Patient currently receives any other outside agency services? No   Equipment Currently Used at Home none   Do you have any problems affording any of your prescribed medications? No   Is the patient taking medications as prescribed? yes   Does the patient have transportation home? Yes   Transportation Available family or friend will provide   Discharge Plan A Home     Introduced self and explained sw role. No anticipated d/c planning needs at this time.     Carlene Alva LCSW    [facility]  [email or web address]    (phone) [phone] or  [place] [phone]  (fax) [phone]

## 2018-11-29 NOTE — PROGRESS NOTES
FHT tracing reviewed with OB staff.  130 bpm, mod BTBV, + accels, - decels  Not william    Overall reassuring  OK to discontinue magnesium sulfate as delivery does not appear imminent at this time  Keep on continuous monitoring    Mary Carrillo MD  OBGYN - PGY 4

## 2018-11-29 NOTE — PROGRESS NOTES
"[facility]  Obstetrics  Antepartum Progress Note    Patient Name: Ximena Robbins  MRN: [id number]  Admission Date: 2018  Hospital Length of Stay: 2 days  Attending Physician: Danii Cassidy MD  Primary Care Provider: Primary Doctor No    Subjective:     Principal Problem:Non-reactive NST (non-stress test)    HPI:  Ximena Robbins is a 28 y.o. F at 29w3d presents complaining of abdominal pain for the past couple of days. This IUP is complicated by Rh negative status, fetal anomalies ( including right aortic arch, polyhydramnios and small stomach,) parental consanguity, and language barrier.  Patient reports contractions, denies vaginal bleeding, denies LOF.   Fetal Movement: normal.       Hospital Course:  2018 Admitted for observation 2/2 to contractions at 29 w3d. Cvx 1/60/-3, FFN unable to be checked 2/2 to recent intercourse. Noted FHT decelerations with contractions in KEEGAN. Maternal resuscitation able to be performed. BMZ given, 6g Mg load started. Admitted for continuous observation.   2018 - Patient stable overnight. Contractions noted to have "spaced out." Continued on Mag Sulfate for fetal neuro-protection. Patient with one prolonged decel this AM with spontaneous resolution. Thus remained on Magnesium and continuous throughout the day.  Additional monitoring was reassuring and the patient was moved downstairs. After additional reassuring monitoring the Mag was discontinued and patient was given a diet.   2018 - No acute events overnight. Patient without complaints this AM.      Interval History:  Patient doing well this AM. Is without complaints.  States she had "1" overnight. Feels good fetal movements. No vaginal bleeding or leaking fluids.      Objective:     Vital Signs (Most Recent):  Temp: 97 °F (36.1 °C) (18)  Pulse: 61 (18)  Resp: 16 (18)  BP: (!) 91/52 (18)  SpO2: 99 % (18) Vital Signs (24h " Range):  Temp:  [96.3 °F (35.7 °C)-98.3 °F (36.8 °C)] 97 °F (36.1 °C)  Pulse:  [61-86] 61  Resp:  [16-18] 16  SpO2:  [84 %-100 %] 99 %  BP: ()/(52-69) 91/52     Weight: 57.6 kg (127 lb)  Body mass index is 23.23 kg/m².    FHT: 125, moderate variability, accels present, no decells, Category 1 - Reassuring  TOCO: Rare      Intake/Output Summary (Last 24 hours) at 11/29/2018 0649  Last data filed at 11/28/2018 2309  Gross per 24 hour   Intake 1547.92 ml   Output 1675 ml   Net -127.08 ml       Cervical Exam:   ON ADMIT  Dilation:  1  Effacement:  60%  Station: -3  Presentation: Vertex     Significant Labs:  Recent Lab Results       11/28/18  1732   11/28/18  1604   11/28/18  1000        Appearance, UA   Clear       Bacteria, UA   Occasional       Bilirubin (UA)   Negative       Color, UA   Yellow       Glucose, UA   Negative       Ketones, UA   2+       Leukocytes, UA   Negative       Magnesium 7.0  Comment:  MG critical result(s) called and verbal readback obtained from Whitley Delong RN. , 11/28/2018 18:17           Microscopic Comment   SEE COMMENT  Comment:  Other formed elements not mentioned in the report are not   present in the microscopic examination.          Nitrite, UA   Negative       Occult Blood UA   1+       pH, UA   6.0       POCT Glucose     100     Protein, UA   Negative  Comment:  Recommend a 24 hour urine protein or a urine   protein/creatinine ratio if globulin induced proteinuria is  clinically suspected.         RBC, UA   1       Specific Gravity, UA   >=1.030       Specimen UA   Urine, Clean Catch       Squam Epithel, UA   3       Urobilinogen, UA   Negative       WBC, UA   4             Physical Exam:   Constitutional: She is oriented to person, place, and time. She appears well-developed and well-nourished. No distress.   Patient appears to be in no acute distress.     HENT:   Head: Normocephalic and atraumatic.    Eyes: Conjunctivae are normal.    Neck: Neck supple.    Cardiovascular:  Normal rate, regular rhythm and intact distal pulses.     Pulmonary/Chest: Effort normal. No respiratory distress.        Abdominal: Soft. She exhibits no distension and no abdominal incision. There is no tenderness. There is no rebound and no guarding.                 Neurological: She is alert and oriented to person, place, and time.    Skin: Skin is warm and dry. She is not diaphoretic.    Psychiatric: She has a normal mood and affect. Her behavior is normal. Judgment and thought content normal.       Assessment/Plan:     28 y.o. female  at 29w5d for:    * Non-reactive NST (non-stress test)    - non reassuring FHTs noted in KEEGAN  - Rare and intermittent contractions  - S/P 2nd dose of BMZ  - S/P Magnesium for neuroprotection  - Reg Diet  - continuous monitoring > Consider de-escalation this AM  - delivery if continued late decelerations   - MFM scan this AM   - No tocolytics if no cervical change      Polyhydramnios affecting pregnancy in third trimester    - noted on last MFM US   - concern for esophageal atresia with small stomach noted      Fetal cardiac anomaly affecting pregnancy, antepartum    - right aortic arch noted     27 weeks gestation of pregnancy    - s/p TDAP  - s/p Rhogam  - S/p flu shot           Felicita Buchanan MD  Obstetrics  [facility]

## 2018-11-29 NOTE — PROCEDURES
"Indication  ========    Evaluation of fetal growth.    Method  ======    Transabdominal ultrasound examination. View: Good view.    Pregnancy  =========    [place] pregnancy. Number of fetuses: 1.    Dating  ======    Cycle: regular cycle  GA by "stated dating" 29 w + 5 d  YASMIN by "stated dating": 2/9/2019  Ultrasound examination on: 11/29/2018  GA by U/S based upon: AC, BPD, Femur, HC  GA by U/S 28 w + 2 d  YASMIN by U/S: 2/19/2019  Assigned: Dating performed on 08/21/2018, based on the external assessment  Assigned GA 29 w + 5 d  Assigned YASMIN: 2/9/2019    General Evaluation  ==============    Cardiac activity: present.  bpm.  Fetal movements: not visualized.  Presentation: cephalic.  Placenta: anterior.  Umbilical cord: 2 vessel cord, placental insertion: normal.  Amniotic fluid: Amount of AF: polyhydramnios. MVP 10.7 cm. YAIMA 37.6 cm. Q1 9.8 cm, Q2 8.9 cm, Q3 8.1 cm, Q4 10.7 cm.    Biophysical Profile  ==============    0: Fetal breathing movements  0: Gross body movements  0: Fetal tone  2: Amniotic fluid volume  2/8: Biophysical profile score  Interpretation: abnormal    Fetal Biometry  ============    Fetal Biometry  BPD 72.7 mm 29w 1d Hadlock  OFD 95.8 mm 31w 0d Dhara  .3 mm 29w 3d Hadlock  .0 mm 26w 4d Hadlock  Femur 52.6 mm 28w 0d Hadlock  EFW 1,082 g 12% Ruben  Calculated by: Hadlock (BPD-HC-AC-FL)  EFW (lb) 2 lb  EFW (oz) 6 oz  Cephalic index 0.76  HC / AC 1.22  FL / BPD 0.72  FL / HC 0.19  FL / AC 0.24  MVP 10.7 cm  YAIMA 37.6 cm   bpm  Head / Face / Neck   8.3 mm    Fetal Anatomy  ============    Cranium: normal  Kidneys: normal  Bladder: normal  Gender: male  Wants to know gender: yes  Other: A full anatomy survey previously performed.    Fetal Doppler  ===========    Umbilical Cord  Umbilical A PS 32.45 cm/s  Umbilical A ED 11.94 cm/s  Umbilical A TAmax 19.95 cm/s  Umbilical A MD 11.87 cm/s  Umbilical A RI 0.67 64% Ricco  Umbilical A PI 1.16 84% Ricco  Umbilical A S / " D 3.03 59% Ricco  Umbilical A  bpm  Head / Brain  Rt MCA PI divided by: Umbilical artery PI  Lt MCA PI divided by: Umbilical artery PI    Impression  =========    Patient brought in for growth and evaluation. It was quickly noted that there was no fetal movement. The fetus was almost more blood and. A  growth had been done and the baby measured 12 percentile. The amniotic fluid was normal but there was no movement, no tone, and no  breathing movements present.    The couple was quickly counseled that we needed to deliver emergently; I explained to them that the baby could die if we did not get it  delivered quickly. I also explained that with the multiple fetal anomalies it is possible that this baby has an aneuploid or genetic syndrome that  is not compatible with life.    The couple seemed to be uncertain and took a period of time to decide that they would be willing to go ahead. After that they asked for time to  pray. When they had given permission, they were wheeled quickly to Labor and Delivery for an emergent  section.    It is noted that this baby has a number of anomalies which had been discussed with the patient and . They declined all genetic  screening/testing except quad screen, fetal echo, MRI. She was admitted for intermittently poor fetal heart rate tracings.

## 2018-11-29 NOTE — SUBJECTIVE & OBJECTIVE
"Interval History:  Patient doing well this AM. Is without complaints.  States she had "1" overnight. Feels good fetal movements. No vaginal bleeding or leaking fluids.      Objective:     Vital Signs (Most Recent):  Temp: 97 °F (36.1 °C) (11/29/18 0450)  Pulse: 61 (11/29/18 0450)  Resp: 16 (11/29/18 0450)  BP: (!) 91/52 (11/29/18 0450)  SpO2: 99 % (11/29/18 0449) Vital Signs (24h Range):  Temp:  [96.3 °F (35.7 °C)-98.3 °F (36.8 °C)] 97 °F (36.1 °C)  Pulse:  [61-86] 61  Resp:  [16-18] 16  SpO2:  [84 %-100 %] 99 %  BP: ()/(52-69) 91/52     Weight: 57.6 kg (127 lb)  Body mass index is 23.23 kg/m².    FHT: 125, moderate variability, accels present, no decells, Category 1 - Reassuring  TOCO: Rare      Intake/Output Summary (Last 24 hours) at 11/29/2018 0649  Last data filed at 11/28/2018 2309  Gross per 24 hour   Intake 1547.92 ml   Output 1675 ml   Net -127.08 ml       Cervical Exam:   ON ADMIT  Dilation:  1  Effacement:  60%  Station: -3  Presentation: Vertex     Significant Labs:  Recent Lab Results       11/28/18  1732   11/28/18  1604   11/28/18  1000        Appearance, UA   Clear       Bacteria, UA   Occasional       Bilirubin (UA)   Negative       Color, UA   Yellow       Glucose, UA   Negative       Ketones, UA   2+       Leukocytes, UA   Negative       Magnesium 7.0  Comment:  MG critical result(s) called and verbal readback obtained from Whitley Delong RN. , 11/28/2018 18:17           Microscopic Comment   SEE COMMENT  Comment:  Other formed elements not mentioned in the report are not   present in the microscopic examination.          Nitrite, UA   Negative       Occult Blood UA   1+       pH, UA   6.0       POCT Glucose     100     Protein, UA   Negative  Comment:  Recommend a 24 hour urine protein or a urine   protein/creatinine ratio if globulin induced proteinuria is  clinically suspected.         RBC, UA   1       Specific Gravity, UA   >=1.030       Specimen UA   Urine, Clean Catch       Squam " Epithel, UA   3       Urobilinogen, UA   Negative       WBC, UA   4             Physical Exam:   Constitutional: She is oriented to person, place, and time. She appears well-developed and well-nourished. No distress.   Patient appears to be in no acute distress.     HENT:   Head: Normocephalic and atraumatic.    Eyes: Conjunctivae are normal.    Neck: Neck supple.    Cardiovascular: Normal rate, regular rhythm and intact distal pulses.     Pulmonary/Chest: Effort normal. No respiratory distress.        Abdominal: Soft. She exhibits no distension and no abdominal incision. There is no tenderness. There is no rebound and no guarding.                 Neurological: She is alert and oriented to person, place, and time.    Skin: Skin is warm and dry. She is not diaphoretic.    Psychiatric: She has a normal mood and affect. Her behavior is normal. Judgment and thought content normal.

## 2018-11-29 NOTE — PROGRESS NOTES
Initiated breast pump use with patient. Taught her how to use the pump, how to clean appropriately, how to bring milk to nicu and frequency of pumping. Assisted in using for the time and instructed her to call if needing further assistance.

## 2018-11-29 NOTE — L&D DELIVERY NOTE
[facility]   Section   Operative Note    SUMMARY     Date of Procedure: 2018     Procedure: Procedure(s) (LRB):   SECTION (N/A)    Surgeon(s) and Role:     * Ina Harper DO - Primary    Assisting Surgeon: Felicita Buchanan MD, Resident PGY-2    Pre-Operative Diagnosis: Known/Suspected Fetal Anomaly, Non Reassuring Fetal Status    Post-Operative Diagnosis: Post-Op Diagnosis Codes:     * Pregnant [Z34.90]      * S/P  Delivery    Anesthesia: Spinal/Epidural    Technical Procedures Used: Primary Low Transverse  Delivery via Pfannenstiel Incision           Description of the Findings of the Procedure:   1. Single viable male infant in vertex presentation  2. Normal appearing uterine contour.    3. Short umbilical cord  4. Excellent hemostasis at conclusion of case    Complications: No    Blood Loss: 110 mL    PreOp CBC:   Lab Results   Component Value Date    WBC 13.50 (H) 2018    HGB 11.4 (L) 2018    HCT 33.0 (L) 2018    MCV 90 2018     2018      Procedure Details  The patient was seen in the Holding Room. The risks, benefits, complications, treatment options, and expected outcomes were discussed with the patient.  The patient concurred with the proposed plan, giving informed consent.  The site of surgery properly noted/marked. The patient was taken to Operating Room, identified as Ximena Robbins and the procedure verified as Primary  Delivery. A Time Out was held and the above information confirmed.    After induction of anesthesia, the patient was prepped and draped in the usual sterile manner while placed in a dorsal supine position with a left lateral tilt.  A jacobson catheter was also placed per nursing. Preoperative antibiotics were administered and an allis test was performed yielding adequate anesthesia.  A Pfannenstiel incision was made and carried down through the subcutaneous tissue to the fascia. Fascial  incision was made and extended transversely. The fascia was grasped with Kocher clamps and  from the underlying rectus tissue superiorly and inferiorly. The peritoneum was identified, found to be free of adherent bowel and entered sharply. Peritoneal incision was extended longitudinally. The Manjinder retractor was then placed in the abdomen with appropriate positioning. A low transverse uterine incision was made with knife and extended with finger fracture. The amniotic sac was ruptured with hysterotomy and the infant was noted to be in Vertex position. The fetal head was brought to the incision and elevated out of the pelvis. The patient delivered a single viable male infant without difficulty.  Infant weighed 1010 grams with Apgar scores of 2/7 at one and five minutes respectively. After the umbilical cord was clamped and cut cord blood was obtained for evaluation. The placenta was removed intact and appeared normal apart from a short umbilical cord. The uterus was left in the abdomen and a limited survey of the pelvic organs demonstrated no gross abnormality, although the ovaries fallopian tubes could not be visualized given the Manjinder retractor. The uterine incision was closed with running locked sutures of 0-Chromic. Hemostasis was observed. Good hemostasis was noted. The fascia was then reapproximated with running sutures of 0-Vicryl. The subcutaneous fat and skin was reapproximated with 2-0 Vicryl and 4-0 Monocryl respectively.    Instrument, sponge, and needle counts were correct prior the abdominal closure and at the conclusion of the case.     Pt tolerated procedure well and Dr. Harper discussed with family that pt was stable and in good condition after the procedure.          Specimens:   Specimen (12h ago, onward)    Start     Ordered    18 1239  Specimen to Pathology - Surgery  Once     Comments:  placenta     Start Status   18 1239 Collected (18 1239)       18 1235           Condition: Good    Disposition: PACU - hemodynamically stable.    Attestation: Good         Delivery Information for  Urbano Robbins    Birth information:  YOB: 2018   Time of birth: 12:16 PM   Sex: male   Head Delivery Date/Time: 2018 12:16 PM   Delivery type: , Low Transverse   Gestational Age: 29w5d    Delivery Providers    Delivering clinician:  Ina Harper,    Provider Role    MD Marie Le, RN     Rhonda Penaloza, [facility]             Measurements    Weight:    Length:           Apgars    Living status:  Living  Apgars:   1 min.:   5 min.:   10 min.:   15 min.:   20 min.:     Skin color:   0  1       Heart rate:   1  2       Reflex irritability:   0  1       Muscle tone:   0  1       Respiratory effort:   1  2       Total:   2  7       Apgars assigned by:  NICU         Operative Delivery    Forceps attempted?:  No  Vacuum extractor attempted?:  No         Shoulder Dystocia    Shoulder dystocia present?:  No           Presentation    Presentation:  Vertex  Position:  Occiput Anterior           Interventions/Resuscitation    Method:  NICU Attended       Cord    Vessels:  2 vessels  Complications:  None  Delayed Cord Clamping?:  No  Cord Clamped Date/Time:  2018 12:16 PM  Cord Blood Disposition:  Sent with Baby  Gases Sent?:  Yes       Placenta    Placenta delivery date/time:  2018 1219  Placenta removal:  Manual removal  Placenta appearance:  Intact  Placenta disposition:  pathology           Labor Events:       labor:       Labor Onset Date/Time:         Dilation Complete Date/Time:         Start Pushing Date/Time:       Rupture Date/Time:              Rupture type:           Fluid Amount:        Fluid Color:        Fluid Odor:        Membrane Status (PeriCalm): INT (Intact)      Rupture Date/Time (PeriCalm):        Fluid Amount (PeriCalm):        Fluid Color (PeriCalm):         steroids:       Antibiotics given  for GBS:       Induction:       Indications for induction:        Augmentation:       Indications for augmentation:       Labor complications:       Additional complications:          Cervical ripening:                     Delivery:      Episiotomy: None     Indication for Episiotomy:       Perineal Lacerations: None Repaired:      Periurethral Laceration: none Repaired:     Labial Laceration: none Repaired:     Sulcus Laceration: none Repaired:     Vaginal Laceration: No Repaired:     Cervical Laceration: No Repaired:     Repair suture:       Repair # of packets:       Vaginal delivery QBL (mL): 0      QBL (mL): 110     Combined Blood Loss (mL): 110     Vaginal Sweep Performed:       Surgicount Correct: Yes       Other providers:       Anesthesia    Method:  Spinal          Details (if applicable):  Trial of Labor No    Categorization: Primary    Priority: Emergency   Indications for : Known/Suspected Fetal Anomaly   Incision Type: low transverse     Additional  information:  Forceps:    Vacuum:    Breech:    Observed anomalies    Other (Comments):

## 2018-11-30 LAB
ABO + RH BLD: NORMAL
BASOPHILS # BLD AUTO: 0.01 K/UL
BASOPHILS NFR BLD: 0.1 %
BLD GP AB SCN CELLS X3 SERPL QL: NORMAL
DIFFERENTIAL METHOD: ABNORMAL
EOSINOPHIL # BLD AUTO: 0 K/UL
EOSINOPHIL NFR BLD: 0.3 %
ERYTHROCYTE [DISTWIDTH] IN BLOOD BY AUTOMATED COUNT: 13 %
FETAL CELL SCN BLD QL ROSETTE: NORMAL
HCT VFR BLD AUTO: 27.7 %
HGB BLD-MCNC: 9.2 G/DL
INJECT RH IG VOL PATIENT: NORMAL ML
LYMPHOCYTES # BLD AUTO: 1.3 K/UL
LYMPHOCYTES NFR BLD: 9 %
MCH RBC QN AUTO: 31.1 PG
MCHC RBC AUTO-ENTMCNC: 33.2 G/DL
MCV RBC AUTO: 94 FL
MONOCYTES # BLD AUTO: 1.2 K/UL
MONOCYTES NFR BLD: 8.3 %
NEUTROPHILS # BLD AUTO: 12 K/UL
NEUTROPHILS NFR BLD: 81.8 %
PLATELET # BLD AUTO: 229 K/UL
PMV BLD AUTO: 10.1 FL
RBC # BLD AUTO: 2.96 M/UL
WBC # BLD AUTO: 14.66 K/UL

## 2018-11-30 PROCEDURE — 86901 BLOOD TYPING SEROLOGIC RH(D): CPT

## 2018-11-30 PROCEDURE — 36415 COLL VENOUS BLD VENIPUNCTURE: CPT

## 2018-11-30 PROCEDURE — 85461 HEMOGLOBIN FETAL: CPT

## 2018-11-30 PROCEDURE — 11000001 HC ACUTE MED/SURG PRIVATE ROOM

## 2018-11-30 PROCEDURE — 63600519 RHOGAM PHARM REV CODE 636 ALT 250 W HCPCS: Performed by: STUDENT IN AN ORGANIZED HEALTH CARE EDUCATION/TRAINING PROGRAM

## 2018-11-30 PROCEDURE — 99232 SBSQ HOSP IP/OBS MODERATE 35: CPT | Mod: ,,, | Performed by: OBSTETRICS & GYNECOLOGY

## 2018-11-30 PROCEDURE — 25000003 PHARM REV CODE 250: Performed by: STUDENT IN AN ORGANIZED HEALTH CARE EDUCATION/TRAINING PROGRAM

## 2018-11-30 PROCEDURE — 85025 COMPLETE CBC W/AUTO DIFF WBC: CPT

## 2018-11-30 PROCEDURE — 63600175 PHARM REV CODE 636 W HCPCS: Performed by: STUDENT IN AN ORGANIZED HEALTH CARE EDUCATION/TRAINING PROGRAM

## 2018-11-30 RX ORDER — BISACODYL 10 MG
10 SUPPOSITORY, RECTAL RECTAL ONCE AS NEEDED
Status: ACTIVE | OUTPATIENT
Start: 2018-11-30 | End: 2018-11-30

## 2018-11-30 RX ORDER — OXYTOCIN/RINGER'S LACTATE 20/1000 ML
41.65 PLASTIC BAG, INJECTION (ML) INTRAVENOUS CONTINUOUS
Status: ACTIVE | OUTPATIENT
Start: 2018-11-30 | End: 2018-11-30

## 2018-11-30 RX ORDER — ADHESIVE BANDAGE
30 BANDAGE TOPICAL 2 TIMES DAILY PRN
Status: DISCONTINUED | OUTPATIENT
Start: 2018-11-30 | End: 2018-12-01 | Stop reason: HOSPADM

## 2018-11-30 RX ORDER — HYDROCORTISONE 25 MG/G
CREAM TOPICAL 3 TIMES DAILY PRN
Status: DISCONTINUED | OUTPATIENT
Start: 2018-11-30 | End: 2018-12-01 | Stop reason: HOSPADM

## 2018-11-30 RX ORDER — SIMETHICONE 80 MG
1 TABLET,CHEWABLE ORAL EVERY 6 HOURS PRN
Status: DISCONTINUED | OUTPATIENT
Start: 2018-11-30 | End: 2018-12-01 | Stop reason: HOSPADM

## 2018-11-30 RX ORDER — DOCUSATE SODIUM 100 MG/1
200 CAPSULE, LIQUID FILLED ORAL 2 TIMES DAILY
Status: DISCONTINUED | OUTPATIENT
Start: 2018-11-30 | End: 2018-12-01 | Stop reason: HOSPADM

## 2018-11-30 RX ORDER — ONDANSETRON 8 MG/1
8 TABLET, ORALLY DISINTEGRATING ORAL EVERY 8 HOURS PRN
Status: DISCONTINUED | OUTPATIENT
Start: 2018-11-30 | End: 2018-12-01 | Stop reason: HOSPADM

## 2018-11-30 RX ORDER — SODIUM CHLORIDE, SODIUM LACTATE, POTASSIUM CHLORIDE, CALCIUM CHLORIDE 600; 310; 30; 20 MG/100ML; MG/100ML; MG/100ML; MG/100ML
INJECTION, SOLUTION INTRAVENOUS CONTINUOUS
Status: ACTIVE | OUTPATIENT
Start: 2018-11-30 | End: 2018-11-30

## 2018-11-30 RX ORDER — HYDROCODONE BITARTRATE AND ACETAMINOPHEN 10; 325 MG/1; MG/1
1 TABLET ORAL EVERY 4 HOURS PRN
Status: DISCONTINUED | OUTPATIENT
Start: 2018-11-30 | End: 2018-12-01 | Stop reason: HOSPADM

## 2018-11-30 RX ORDER — DIPHENHYDRAMINE HCL 25 MG
25 CAPSULE ORAL EVERY 4 HOURS PRN
Status: DISCONTINUED | OUTPATIENT
Start: 2018-11-30 | End: 2018-12-01 | Stop reason: HOSPADM

## 2018-11-30 RX ORDER — IBUPROFEN 600 MG/1
600 TABLET ORAL EVERY 6 HOURS
Status: DISCONTINUED | OUTPATIENT
Start: 2018-11-30 | End: 2018-12-01 | Stop reason: HOSPADM

## 2018-11-30 RX ORDER — AMOXICILLIN 250 MG
1 CAPSULE ORAL NIGHTLY PRN
Status: DISCONTINUED | OUTPATIENT
Start: 2018-11-30 | End: 2018-12-01 | Stop reason: HOSPADM

## 2018-11-30 RX ORDER — HYDROCODONE BITARTRATE AND ACETAMINOPHEN 5; 325 MG/1; MG/1
1 TABLET ORAL EVERY 4 HOURS PRN
Status: DISCONTINUED | OUTPATIENT
Start: 2018-11-30 | End: 2018-12-01 | Stop reason: HOSPADM

## 2018-11-30 RX ADMIN — IBUPROFEN 600 MG: 600 TABLET ORAL at 06:11

## 2018-11-30 RX ADMIN — IBUPROFEN 600 MG: 600 TABLET ORAL at 12:11

## 2018-11-30 RX ADMIN — DOCUSATE SODIUM 200 MG: 100 CAPSULE, LIQUID FILLED ORAL at 09:11

## 2018-11-30 RX ADMIN — IBUPROFEN 600 MG: 600 TABLET ORAL at 11:11

## 2018-11-30 RX ADMIN — HYDROCODONE BITARTRATE AND ACETAMINOPHEN 1 TABLET: 10; 325 TABLET ORAL at 12:11

## 2018-11-30 RX ADMIN — HYDROCODONE BITARTRATE AND ACETAMINOPHEN 1 TABLET: 10; 325 TABLET ORAL at 06:11

## 2018-11-30 RX ADMIN — OXYCODONE HYDROCHLORIDE 10 MG: 5 TABLET ORAL at 05:11

## 2018-11-30 RX ADMIN — PANTOPRAZOLE SODIUM 40 MG: 40 TABLET, DELAYED RELEASE ORAL at 09:11

## 2018-11-30 RX ADMIN — HUMAN RHO(D) IMMUNE GLOBULIN 300 MCG: 300 INJECTION, SOLUTION INTRAMUSCULAR at 02:11

## 2018-11-30 RX ADMIN — ACETAMINOPHEN 650 MG: 325 TABLET ORAL at 05:11

## 2018-11-30 RX ADMIN — KETOROLAC TROMETHAMINE 30 MG: 30 INJECTION, SOLUTION INTRAMUSCULAR at 05:11

## 2018-11-30 NOTE — PROGRESS NOTES
POSTPARTUM PROGRESS NOTE     Ximena Robbins is a 28 y.o. female POD #1 status post Primary  section at 29w5d in a pregnancy complicated by polyhydramnios, fetal cardiac anomaly, consanguinity, CMV IgG pos, elevated MSP. Patient is doing well this morning. She denies nausea, vomiting, fever or chills. Pt desires discharge home today  Patient reports mild abdominal pain that is adequately relieved by oral pain medications. Lochia is mild to moderate  and decreasing. Patient with jacobson removed this AM and she hast not yet voided. She has passed flatus, and has not had BM.  Patient does not plan to breast feed. Deferring circumcision since baby in NICU.     Objective:       Temp:  [97 °F (36.1 °C)-98.8 °F (37.1 °C)] 98.3 °F (36.8 °C)  Pulse:  [] 55  Resp:  [16-18] 18  SpO2:  [85 %-100 %] 99 %  BP: ()/(49-62) 99/51    General:   alert, appears stated age and cooperative   Lungs:   clear to auscultation bilaterally   Heart:   regular rate and rhythm, S1, S2 normal, no murmur, click, rub or gallop   Abdomen:  soft, non-tender; bowel sounds normal; no masses,  no organomegaly   Uterus:  firm located at the umblicus.    Incision: Bandage in place, clean, dry and intact   Extremities: peripheral pulses normal, no pedal edema, no clubbing or cyanosis     Lab Review  No results found for this or any previous visit (from the past 4 hour(s)).    I/O    Intake/Output Summary (Last 24 hours) at 2018 0724  Last data filed at 2018 0530  Gross per 24 hour   Intake 1700 ml   Output 1265 ml   Net 435 ml        Assessment:     Patient Active Problem List   Diagnosis    Pregnancy, supervision, normal, first    27 weeks gestation of pregnancy    Fetal cardiac anomaly affecting pregnancy, antepartum    Pregnancy complicated by fetal cerebral ventriculomegaly    Polyhydramnios affecting pregnancy in third trimester    Non-reactive NST (non-stress test)     delivery delivered      delivery -  for Non-Reassuring Fetal Status        Plan:   1. Postpartum care:  - Patient doing well. Continue routine management and advances.  - Continue PO pain meds. Pain well controlled.  - Heme: H/h 11.5/34.1 > 11.4/33.0  - Encourage ambulation  - Circumcision deferred - baby in NICU  - Lactation consult PRN        Dispo: As patient meets milestones, will plan to discharge POD2-3.      Leesa Diaz MD   OBGYN, PGY-1

## 2018-11-30 NOTE — ANESTHESIA POSTPROCEDURE EVALUATION
"Anesthesia Post Evaluation    Patient: Ximena Robbins    Procedure(s) Performed: Procedure(s) (LRB):   SECTION (N/A)    Final Anesthesia Type: spinal  Patient location during evaluation: labor & delivery  Patient participation: Yes- Able to Participate  Level of consciousness: awake and alert and oriented  Post-procedure vital signs: reviewed and stable  Pain management: adequate  Airway patency: patent  PONV status at discharge: No PONV  Anesthetic complications: no      Cardiovascular status: blood pressure returned to baseline  Respiratory status: unassisted and spontaneous ventilation  Hydration status: euvolemic  Follow-up not needed.        Visit Vitals  BP (!) 94/55   Pulse (!) 55   Temp 36.8 °C (98.3 °F) (Oral)   Resp 18   Ht 5' 2" (1.575 m)   Wt 57.6 kg (127 lb)   LMP 2018 (LMP Unknown)   SpO2 97%   Breastfeeding? Yes   BMI 23.23 kg/m²       Pain/Marilee Score: Pain Assessment Performed: Yes (2018  6:48 PM)  Presence of Pain: denies (2018  6:48 PM)  Pain Rating Prior to Med Admin: 8 (2018 12:43 PM)  Pain Rating Post Med Admin: 4 (2018  6:37 AM)        "

## 2018-11-30 NOTE — PLAN OF CARE
COPIED FROM INFANT'S CHART    SOCIAL WORK DISCHARGE PLANNING ASSESSMENT     Sw completed discharge planning assessment with pt's parents in mother's room 631.  Pt's parents were easily engaged. Education on the role of  was provided. Emotional support provided throughout assessment.        Legal Name: unknown                                 :  2018         Patient Active Problem List   Diagnosis    Prematurity, 1,000-1,249 grams, 29-30 completed weeks    Acute respiratory distress in  with surfactant disorder    Need for observation and evaluation of  for sepsis    Atresia of esophagus with tracheo-esophageal fistula    Congenital heart disease            [facility]irth [facility]                                   YASMIN: 19     Birth Weight:   No birth weight on file.                        Birth Length: 37.0 cm              Gestational Age: 29w5d           Apgars    Living status:  Living  Apgars:   1 min.:   5 min.:   10 min.:   15 min.:   20 min.:     Skin color:   0  1          Heart rate:   1  2          Reflex irritability:   0  1          Muscle tone:   0  1          Respiratory effort:   1  2          Total:   2  7          Apgars assigned by:  NICU            Mother: Ximena Robbins, age 28,  1990  Address: [address] [place] [address]  Phone: [phone]  Employer: none                       Job Title: n/a  Education: some college        Father: David Garcia, age 28,  1990  Address: same as above  Phone: [phone]  Job Title: MD  Education:  medical degree  Signed Birth Certificate: Yes; parents are      Support person(s): parents voiced that they did not want to put any one down for support     Sibling(s): none     Spiritual Affiliation: Yes  Baptism     Commercial Insurance Coverage: Yes  Mother's [facility] Health Plan (formerly [place] Medicaid): Primary: No Secondary: No       Pediatrician:  [facility].  List provided.  Mom to select MD and inform bedside RN       Nutrition: Expressed Breast Milk               Breast Pump:              No               Plans to obtain through commercial insurance company               WIC:              N/A        Essential Items: (includes car seat, crib/bassinet/pack-n-play, clothing, bottles, diapers, etc.)  Plans to acquire by discharge      Transportation: Personal vehicle      Education: Information given on CPR classes and Physician/NNP daily rounds.      Resources Given: [facility] [employer], [facility], Medicaid transportation, Immunizations, Glossary of Commonly Used Terms, SSI Benefits, Preparing for Your Baby's Discharge Home, Support Resources for NICU Families, Insurance Coverage of Breast Pumps and Supplies, Breast Pumps through [facility], [facility], [facility], and [facility].        Potential Eligibility for SSI Benefits: Yes. Sw to provide diagnosis letter for application process.     Potential Discharge Needs:  [facility]         Corky Myrick, [facility]  NICU   Phone [phone] Ext. [phone]  [email or web address]

## 2018-11-30 NOTE — PLAN OF CARE
Problem: Patient Care Overview  Goal: Plan of Care Review  Outcome: Ongoing (interventions implemented as appropriate)  Patient to follow basic breast pump education.

## 2018-11-30 NOTE — LACTATION NOTE
11/29/18 1650   Infant Information   Infant's Medical Care Provider NICU   Maternal Infant Assessment   Breast Shape Bilateral:;round   Breast Density Bilateral:;soft   Areola Bilateral:;elastic   Nipple(s) Bilateral:;everted   Maternal Infant Feeding   Time Spent (min) 15-30 min   Breastfeeding Education milk expression, electric pump;milk expression, hand;label/storage of breast milk;adequate infant intake;increasing milk supply   Equipment Type/Education   Pump Type Symphony   Breast Pump Type double electric, hospital grade   Breast Pump Flange Type hard   Breast Pump Flange Size 24 mm   Breast Pumping Bilateral Breasts:;pumped until emptied;milk labeled/stored   Pumping Frequency (times) (8 or more in 24hrs encouraged)   Lactation Referrals   Lactation Consult Pump teaching;Initial assessment;Knowledge deficit   Lactation Interventions   Attachment Promotion counseling provided   Breastfeeding Assistance support offered;milk expression/pumping;electric breast pump used   Maternal Breastfeeding Support lactation counseling provided;encouragement offered   LC to room, reviewed basic pump education with NICU folder. Assisted with pumping session and taught hand expression after pumping, 1ml EBM labeled and stored in MBU fridge. Reviewed pumping at least 8 times in 24hrs, breast pump use on initiation setting, cleaning parts, sterilizing daily, milk storage/handling/labeling.  number on board.

## 2018-12-01 VITALS
TEMPERATURE: 98 F | SYSTOLIC BLOOD PRESSURE: 110 MMHG | DIASTOLIC BLOOD PRESSURE: 65 MMHG | OXYGEN SATURATION: 99 % | BODY MASS INDEX: 23.37 KG/M2 | RESPIRATION RATE: 18 BRPM | HEIGHT: 62 IN | HEART RATE: 62 BPM | WEIGHT: 127 LBS

## 2018-12-01 PROCEDURE — 25000003 PHARM REV CODE 250: Performed by: STUDENT IN AN ORGANIZED HEALTH CARE EDUCATION/TRAINING PROGRAM

## 2018-12-01 RX ORDER — IBUPROFEN 600 MG/1
600 TABLET ORAL EVERY 6 HOURS
Qty: 30 TABLET | Refills: 2 | Status: SHIPPED | OUTPATIENT
Start: 2018-12-01 | End: 2018-12-24

## 2018-12-01 RX ORDER — HYDROCODONE BITARTRATE AND ACETAMINOPHEN 5; 325 MG/1; MG/1
1 TABLET ORAL EVERY 4 HOURS PRN
Qty: 20 TABLET | Refills: 0 | Status: SHIPPED | OUTPATIENT
Start: 2018-12-01 | End: 2018-12-24

## 2018-12-01 RX ADMIN — PANTOPRAZOLE SODIUM 40 MG: 40 TABLET, DELAYED RELEASE ORAL at 08:12

## 2018-12-01 RX ADMIN — IBUPROFEN 600 MG: 600 TABLET ORAL at 06:12

## 2018-12-01 RX ADMIN — DOCUSATE SODIUM 200 MG: 100 CAPSULE, LIQUID FILLED ORAL at 08:12

## 2018-12-01 RX ADMIN — HYDROCODONE BITARTRATE AND ACETAMINOPHEN 1 TABLET: 10; 325 TABLET ORAL at 08:12

## 2018-12-01 RX ADMIN — IBUPROFEN 600 MG: 600 TABLET ORAL at 12:12

## 2018-12-01 RX ADMIN — HYDROCODONE BITARTRATE AND ACETAMINOPHEN 1 TABLET: 5; 325 TABLET ORAL at 02:12

## 2018-12-01 NOTE — PROGRESS NOTES
POSTPARTUM PROGRESS NOTE     Ximena Robbins is a 28 y.o. female POD #2 status post Primary  section at 29w5d in a pregnancy complicated by polyhydramnios, fetal cardiac anomaly, consanguinity, CMV IgG pos, elevated MSP. Patient is doing well this morning. She denies nausea, vomiting, fever or chills. Pt desires discharge home today  Patient reports mild abdominal pain that is adequately relieved by oral pain medications. Lochia is mild to moderate  and decreasing. Patient with jacobson removed this AM and she hast not yet voided. She has passed flatus, and has not had BM.  Patient does not plan to breast feed. Deferring circumcision since baby in NICU.     Objective:       Temp:  [98.3 °F (36.8 °C)-99.2 °F (37.3 °C)] 99.2 °F (37.3 °C)  Pulse:  [68-76] 76  Resp:  [16-18] 16  SpO2:  [98 %] 98 %  BP: (106-124)/(64-70) 106/64    General:   alert, appears stated age and cooperative   Lungs:   clear to auscultation bilaterally   Heart:   regular rate and rhythm, S1, S2 normal, no murmur, click, rub or gallop   Abdomen:  soft, non-tender; bowel sounds normal; no masses,  no organomegaly   Uterus:  firm located at the umblicus.    Incision: Incision healing well, no erythema, no drainage   Extremities: peripheral pulses normal, no pedal edema, no clubbing or cyanosis     Lab Review  No results found for this or any previous visit (from the past 4 hour(s)).    I/O    Intake/Output Summary (Last 24 hours) at 2018 0803  Last data filed at 2018 1305  Gross per 24 hour   Intake --   Output 650 ml   Net -650 ml        Assessment:     Patient Active Problem List   Diagnosis    Pregnancy, supervision, normal, first    27 weeks gestation of pregnancy    Fetal cardiac anomaly affecting pregnancy, antepartum    Pregnancy complicated by fetal cerebral ventriculomegaly    Polyhydramnios affecting pregnancy in third trimester    Non-reactive NST (non-stress test)     delivery delivered     delivery  -  for Non-Reassuring Fetal Status        Plan:   1. Postpartum care:  - Patient doing well. Continue routine management and advances.  - Continue PO pain meds. Pain well controlled.  - Heme: H/h 11.5/34.1 > 11.4/33.0  - Encourage ambulation  - Circumcision deferred - baby in NICU  - Lactation consult PRN        Dispo: As patient meets milestones, will plan to discharge today.      Leesa Diaz MD   OBGYN, PGY-1

## 2018-12-01 NOTE — PLAN OF CARE
Problem: Patient Care Overview  Goal: Plan of Care Review  Outcome: Ongoing (interventions implemented as appropriate)  Lactation note:   did DC teaching for NICU mother pumping for her baby. Mother has NICU Blue folder for lactation. Mother declined hospital  and asked that her  be used. Reviewed how to work pump, how to keep track of pumpings, how to label nicu breastmilk, how to clean pump parts and bring milk to NICU even if it is only a drop of milk. NICU uses mother's milk for mouth care so even small amounts are ok to bring to NICU. Mother aware to pump 8 or more times a day for 15-20 minutes. Mother is aware of proper techniques for transporting her breastmilk and is aware of the written instructions in her blue folder. Mother is using a symphony pump at home and is aware that she can use the Symphony breastpump at the baby's bedside when she visits. Mother has the [facility] phone number and the [facility] phone number to call for further questions.

## 2018-12-24 ENCOUNTER — POSTPARTUM VISIT (OUTPATIENT)
Dept: OBSTETRICS AND GYNECOLOGY | Facility: CLINIC | Age: 28
End: 2018-12-24
Payer: COMMERCIAL

## 2018-12-24 VITALS
BODY MASS INDEX: 21.86 KG/M2 | SYSTOLIC BLOOD PRESSURE: 98 MMHG | WEIGHT: 118.81 LBS | HEIGHT: 62 IN | DIASTOLIC BLOOD PRESSURE: 56 MMHG

## 2018-12-24 PROCEDURE — 99999 PR PBB SHADOW E&M-EST. PATIENT-LVL III: CPT | Mod: PBBFAC,,, | Performed by: ADVANCED PRACTICE MIDWIFE

## 2018-12-24 PROCEDURE — 0503F POSTPARTUM CARE VISIT: CPT | Mod: S$GLB,,, | Performed by: ADVANCED PRACTICE MIDWIFE

## 2018-12-24 NOTE — PROGRESS NOTES
"HISTORY OF PRESENT ILLNESS:  Ximena Robbins is a 28 y.o. female   Presents today for a post partum exam . Pt  Is s/p primary LTCS at 29 weeks gestation sec to non reassuring fetal survey on US.   -The labor and delivery was (uncomplicated) (complicated by          ).    Delivery Date: 18   Delivery MD:  Meredith  Gender:  Male  Baby Name:  Anton  Birth Weight:  1010gm at 29 weeks  Work plans - does not work outside home  Breast Feeding: Infant in NICU- pumping  Vaginal Bleeding: None  Incontinence: No  Depression: No  [place] yet: No  Contraception discussed and patient desires Oral contraceptives  Support system: Good.    PREGNANCY COMPLICATED BY:    Current Outpatient Medications on File Prior to Visit   Medication Sig Dispense Refill    [DISCONTINUED] HYDROcodone-acetaminophen (NORCO) 5-325 mg per tablet Take 1 tablet by mouth every 4 (four) hours as needed. 20 tablet 0    [DISCONTINUED] ibuprofen (ADVIL,MOTRIN) 600 MG tablet Take 1 tablet (600 mg total) by mouth every 6 (six) hours. 30 tablet 2    [DISCONTINUED] ranitidine (ZANTAC) 150 MG tablet Take 150 mg by mouth Daily.       No current facility-administered medications on file prior to visit.        BP (!) 98/56   Ht 5' 2" (1.575 m)   Wt 53.9 kg (118 lb 13.3 oz)   LMP 2018 (LMP Unknown)   BMI 21.73 kg/m²     PE:   APPEARANCE: Well nourished, well developed, in no acute distress.   AFFECT: WNL, alert and oriented x 3.   NECK: Neck symmetric, without masses or thyromegaly.   NODES: No inguinal, cervical, axillary or femoral lymph node enlargement.   ABDOMEN: Soft. No tenderness or masses. No hepatosplenomegaly. No hernias. Incision clean, dry, intact, no evidence of infection.  BREASTS:Not examined due to breast feeding.  PELVIC: External female genitalia without lesions. Female hair distribution. Adequate perineal body, Normal urethral meatus. Vagina moist and well rugated without lesions or discharge. No significant cystocele " or rectocele present. Cervix pink without lesions, discharge or tenderness. Uterus is involuted to 4-6 week size, regular, mobile and nontender. Adnexa without masses or tenderness.   EXTREMITIES: No edema     DIAGNOSIS:   1. Normal Post Partum Exam     LABS AND TESTS ORDERED:     MEDICATIONS PRESCRIBED:       COUNSELING:   The patient was counseled today on:    Need to continue pumping breasts 6-8 times daily to keep milk supply established in event infant can go to breast after NICU  -all contraceptive options and she chose OCPs-  to get name of the pill she was taking in the past as pt desires to use same pill.   -may resume usual activities;      FOLLOW-UP for a WWE and Pap.

## 2018-12-27 ENCOUNTER — PATIENT MESSAGE (OUTPATIENT)
Dept: OBSTETRICS AND GYNECOLOGY | Facility: CLINIC | Age: 28
End: 2018-12-27

## 2019-01-09 ENCOUNTER — PATIENT MESSAGE (OUTPATIENT)
Dept: OBSTETRICS AND GYNECOLOGY | Facility: CLINIC | Age: 29
End: 2019-01-09

## 2019-01-11 ENCOUNTER — TELEPHONE (OUTPATIENT)
Dept: OBSTETRICS AND GYNECOLOGY | Facility: CLINIC | Age: 29
End: 2019-01-11

## 2019-01-11 DIAGNOSIS — Z30.011 ENCOUNTER FOR INITIAL PRESCRIPTION OF CONTRACEPTIVE PILLS: Primary | ICD-10-CM

## 2019-01-11 RX ORDER — ACETAMINOPHEN AND CODEINE PHOSPHATE 120; 12 MG/5ML; MG/5ML
1 SOLUTION ORAL DAILY
Qty: 28 TABLET | Refills: 11 | Status: SHIPPED | OUTPATIENT
Start: 2019-01-11 | End: 2020-03-17

## 2019-03-04 PROBLEM — O40.3XX0 POLYHYDRAMNIOS AFFECTING PREGNANCY IN THIRD TRIMESTER: Status: RESOLVED | Noted: 2018-11-14 | Resolved: 2019-03-04

## 2019-09-28 ENCOUNTER — OFFICE VISIT (OUTPATIENT)
Dept: URGENT CARE | Facility: CLINIC | Age: 29
End: 2019-09-28
Payer: COMMERCIAL

## 2019-09-28 VITALS
OXYGEN SATURATION: 100 % | DIASTOLIC BLOOD PRESSURE: 76 MMHG | HEIGHT: 62 IN | WEIGHT: 118 LBS | TEMPERATURE: 100 F | RESPIRATION RATE: 18 BRPM | BODY MASS INDEX: 21.71 KG/M2 | SYSTOLIC BLOOD PRESSURE: 102 MMHG | HEART RATE: 70 BPM

## 2019-09-28 DIAGNOSIS — R05.9 COUGH: ICD-10-CM

## 2019-09-28 DIAGNOSIS — J20.9 ACUTE BRONCHITIS, UNSPECIFIED ORGANISM: Primary | ICD-10-CM

## 2019-09-28 DIAGNOSIS — J06.9 UPPER RESPIRATORY INFECTION WITH COUGH AND CONGESTION: ICD-10-CM

## 2019-09-28 PROCEDURE — 3008F BODY MASS INDEX DOCD: CPT | Mod: CPTII,S$GLB,, | Performed by: NURSE PRACTITIONER

## 2019-09-28 PROCEDURE — 99213 PR OFFICE/OUTPT VISIT, EST, LEVL III, 20-29 MIN: ICD-10-PCS | Mod: S$GLB,,, | Performed by: NURSE PRACTITIONER

## 2019-09-28 PROCEDURE — 99213 OFFICE O/P EST LOW 20 MIN: CPT | Mod: S$GLB,,, | Performed by: NURSE PRACTITIONER

## 2019-09-28 PROCEDURE — 3008F PR BODY MASS INDEX (BMI) DOCUMENTED: ICD-10-PCS | Mod: CPTII,S$GLB,, | Performed by: NURSE PRACTITIONER

## 2019-09-28 RX ORDER — PROMETHAZINE HYDROCHLORIDE AND DEXTROMETHORPHAN HYDROBROMIDE 6.25; 15 MG/5ML; MG/5ML
5 SYRUP ORAL NIGHTLY PRN
Qty: 118 ML | Refills: 0 | Status: SHIPPED | OUTPATIENT
Start: 2019-09-28 | End: 2020-02-15

## 2019-09-28 RX ORDER — ALBUTEROL SULFATE 90 UG/1
2 AEROSOL, METERED RESPIRATORY (INHALATION) EVERY 6 HOURS PRN
Qty: 18 G | Refills: 0 | Status: SHIPPED | OUTPATIENT
Start: 2019-09-28 | End: 2020-06-18

## 2019-09-28 NOTE — PATIENT INSTRUCTIONS
Return to Urgent Care or go to ER if symptoms worsen or fail to improve.  Follow up with PCP as recommended for further management.       What Is Acute Bronchitis?  Acute bronchitis is when the airways in your lungs (bronchial tubes) become red and swollen (inflamed). It is usually caused by a viral infection. But it can also occur because of a bacteria or allergen. Symptoms include a cough that produces yellow or greenish mucus and can last for days or sometimes weeks.  Inside healthy lungs    Air travels in and out of the lungs through the airways. The linings of these airways produce sticky mucus. This mucus traps particles that enter the lungs. Tiny structures called cilia then sweep the particles out of the airways.     Healthy airway: Airways are normally open. Air moves in and out easily.      Healthy cilia: Tiny, hairlike cilia sweep mucus and particles up and out of the airways.   Lungs with bronchitis  Bronchitis often occurs with a cold or the flu virus. The airways become inflamed (red and swollen). There is a deep hacking cough from the extra mucus. Other symptoms may include:  · Wheezing  · Chest discomfort  · Shortness of breath  · Mild fever  A second infection, this time due to bacteria, may then occur. And airways irritated by allergens or smoke are more likely to get infected.        Inflamed airway: Inflammation and extra mucus narrow the airway, causing shortness of breath.      Impaired cilia: Extra mucus impairs cilia, causing congestion and wheezing. Smoking makes the problem worse.   Making a diagnosis  A physical exam, health history, and certain tests help your healthcare provider make the diagnosis.  Health history  Your healthcare provider will ask you about your symptoms.  The exam  Your provider listens to your chest for signs of congestion. He or she may also check your ears, nose, and throat.  Possible tests  · A sputum test for bacteria. This requires a sample of mucus from your  lungs.  · A nasal or throat swab. This tests to see if you have a bacterial infection.  · A chest X-ray. This is done if your healthcare provider thinks you have pneumonia.  · Tests to check for an underlying condition. Other tests may be done to check for things such as allergies, asthma, or COPD (chronic obstructive pulmonary disease). You may need to see a specialist for more lung function testing.  Treating a cough  The main treatment for bronchitis is easing symptoms. Avoiding smoke, allergens, and other things that trigger coughing can often help. If the infection is bacterial, you may be given antibiotics. During the illness, it's important to get plenty of sleep. To ease symptoms:  · Dont smoke. Also avoid secondhand smoke.  · Use a humidifier. Or try breathing in steam from a hot shower. This may help loosen mucus.  · Drink a lot of water and juice. They can soothe the throat and may help thin mucus.  · Sit up or use extra pillows when in bed. This helps to lessen coughing and congestion.  · Ask your provider about using medicine. Ask about using cough medicine, pain and fever medicine, or a decongestant.  Antibiotics  Most cases of bronchitis are caused by cold or flu viruses. They dont need antibiotics to treat them, even if your mucus is thick and green or yellow. Antibiotics dont treat viral illness and antibiotics have not been shown to have any benefit in cases of acute bronchitis. Taking antibiotics when they are not needed increases your risk of getting an infection later that is antibiotic-resistant. Antibiotics can also cause severe cases of diarrhea that require other antibiotics to treat.  It is important that you accept your healthcare provider's opinion to not use antibiotics. Your provider will prescribe antibiotics if the infection is caused by bacteria. If they are prescribed:  · Take all of the medicine. Take the medicine until it is used up, even if symptoms have improved. If you  dont, the bronchitis may come back.  · Take the medicines as directed. For instance, some medicines should be taken with food.  · Ask about side effects. Ask your provider or pharmacist what side effects are common, and what to do about them.  Follow-up care  You should see your provider again in 2 to 3 weeks. By this time, symptoms should have improved. An infection that lasts longer may mean you have a more serious problem.  Prevention  · Avoid tobacco smoke. If you smoke, quit. Stay away from smoky places. Ask friends and family not to smoke around you, or in your home or car.  · Get checked for allergies.  · Ask your provider about getting a yearly flu shot. Also ask about pneumococcal or pneumonia shots.  · Wash your hands often. This helps reduce the chance of picking up viruses that cause colds and flu.  Call your healthcare provider if:  · Symptoms worsen, or you have new symptoms  · Breathing problems worsen or  become severe  · Symptoms dont get better within a week, or within 3 days of taking antibiotics   Date Last Reviewed: 2/1/2017  © 3723-8437 [employer]. [address] [place] [address]. All rights reserved. This information is not intended as a substitute for professional medical care. Always follow your healthcare professional's instructions.        Bronchitis, Viral (Adult)    You have a viral bronchitis. Bronchitis is inflammation and swelling of the lining of the lungs. This is often caused by an infection. Symptoms include a dry, hacking cough that is worse at night. The cough may bring up yellow-green mucus. You may also feel short of breath or wheeze. Other symptoms may include tiredness, chest discomfort, and chills.  Bronchitis that is caused by a virus is not treated with antibiotics. Instead, medicines may be given to help relieve symptoms. Symptoms can last up to 2 weeks, although the cough may last much longer.  This illness is contagious during the first few  days and is spread through the air by coughing and sneezing, or by direct contact (touching the sick person and then touching your own eyes, nose, or mouth).  Most viral illnesses resolve within 10 to 14 days with rest and simple home remedies, although they may sometimes last for several weeks.  Home care  · If symptoms are severe, rest at home for the first 2 to 3 days. When you go back to your usual activities, don't let yourself get too tired.  · Do not smoke. Also avoid being exposed to secondhand smoke.  · You may use over-the-counter medicine to control fever or pain, unless another pain medicine was prescribed. (Note: If you have chronic liver or kidney disease or have ever had a stomach ulcer or gastrointestinal bleeding, talk with your healthcare provider before using these medicines. Also talk to your provider if you are taking medicine to prevent blood clots.) Aspirin should never be given to anyone younger than 18 years of age who is ill with a viral infection or fever. It may cause severe liver or brain damage.  · Your appetite may be poor, so a light diet is fine. Avoid dehydration by drinking 6 to 8 glasses of fluids per day (such as water, soft drinks, sports drinks, juices, tea, or soup). Extra fluids will help loosen secretions in the nose and lungs.  · Over-the-counter cough, cold, and sore-throat medicines will not shorten the length of the illness, but they may help to reduce symptoms. (Note: Do not use decongestants if you have high blood pressure.)  Follow-up care  Follow up with your healthcare provider, or as advised. If you had an X-ray or ECG (electrocardiogram), a specialist will review it. You will be notified of any new findings that may affect your care.  Note: If you are age 65 or older, or if you have a chronic lung disease or condition that affects your immune system, or you smoke, talk to your healthcare provider about having pneumococcal vaccinations and a yearly influenza  vaccination (flu shot).  When to seek medical advice  Call your healthcare provider right away if any of these occur:  · Fever of 100.4°F (38°C) or higher  · Coughing up increased amounts of colored sputum  · Weakness, drowsiness, headache, facial pain, ear pain, or a stiff neck  Call 911, or get immediate medical care  Contact emergency services right away if any of these occur:  · Coughing up blood  · Worsening weakness, drowsiness, headache, or stiff neck  · Trouble breathing, wheezing, or pain with breathing  Date Last Reviewed: 9/13/2015  © 8458-1572 [employer]. [address] [place] [id number]. All rights reserved. This information is not intended as a substitute for professional medical care. Always follow your healthcare professional's instructions.

## 2019-09-28 NOTE — PROGRESS NOTES
"Subjective:       Patient ID: Ximena Robbins is a 29 y.o. female.    Vitals:  height is 5' 2" (1.575 m) and weight is 53.5 kg (118 lb). Her temperature is 99.5 °F (37.5 °C). Her blood pressure is 102/76 and her pulse is 70. Her respiration is 18 and oxygen saturation is 100%.     Chief Complaint: Cough (cough x 5 days )    Pt. Has a history of acute bronchitis following viral respiratory infections.  Reports wheezing and cough that occurs mostly at night.  Requesting albuterol inhaler.     Cough   This is a new problem. The current episode started in the past 7 days. The problem has been unchanged. The problem occurs constantly. The cough is non-productive. Pertinent negatives include no chills, ear pain, eye redness, fever, hemoptysis, myalgias, rash, sore throat, shortness of breath or wheezing. Nothing aggravates the symptoms. She has tried nothing for the symptoms.       Constitution: Negative for chills, sweating, fatigue and fever.   HENT: Positive for congestion. Negative for ear pain, sinus pain, sinus pressure, sore throat and voice change.    Neck: Negative for painful lymph nodes.   Eyes: Negative for eye redness.   Respiratory: Positive for cough. Negative for chest tightness, sputum production, bloody sputum, COPD, shortness of breath, stridor, wheezing and asthma.    Gastrointestinal: Negative for nausea and vomiting.   Musculoskeletal: Negative for muscle ache.   Skin: Negative for rash.   Allergic/Immunologic: Negative for seasonal allergies and asthma.   Hematologic/Lymphatic: Negative for swollen lymph nodes.       Objective:      Physical Exam   Constitutional: She is oriented to person, place, and time. She appears well-developed and well-nourished. She is cooperative.  Non-toxic appearance. She does not appear ill. No distress.   HENT:   Head: Normocephalic and atraumatic.   Right Ear: Hearing, tympanic membrane, external ear and ear canal normal.   Left Ear: Hearing, tympanic membrane, " external ear and ear canal normal.   Nose: Mucosal edema and rhinorrhea present. No nasal deformity. No epistaxis. Right sinus exhibits no maxillary sinus tenderness and no frontal sinus tenderness. Left sinus exhibits no maxillary sinus tenderness and no frontal sinus tenderness.   Mouth/Throat: Uvula is midline and mucous membranes are normal. No uvula swelling. Posterior oropharyngeal erythema present. No oropharyngeal exudate or tonsillar abscesses. No tonsillar exudate.   Eyes: Pupils are equal, round, and reactive to light. Conjunctivae, EOM and lids are normal. No scleral icterus.   Neck: Trachea normal, normal range of motion and phonation normal. Neck supple. No neck rigidity.   Cardiovascular: Normal rate, regular rhythm and normal heart sounds.   Pulses:       Radial pulses are 2+ on the right side, and 2+ on the left side.   Pulmonary/Chest: Effort normal and breath sounds normal. No respiratory distress.   Abdominal: Soft. Normal appearance. There is no tenderness.   Musculoskeletal: Normal range of motion. She exhibits no edema.   Lymphadenopathy:        Head (right side): No submental, no submandibular, no tonsillar, no preauricular and no posterior auricular adenopathy present.        Head (left side): No submental, no submandibular, no tonsillar, no preauricular and no posterior auricular adenopathy present.     She has no cervical adenopathy.   Neurological: She is alert and oriented to person, place, and time. She exhibits normal muscle tone. Coordination and gait normal.   Skin: Skin is warm, dry and intact. No rash noted. She is not diaphoretic.   Psychiatric: She has a normal mood and affect. Her speech is normal.   Nursing note and vitals reviewed.      Assessment:       1. Acute bronchitis, unspecified organism    2. Cough    3. Upper respiratory infection with cough and congestion        Plan:         Acute bronchitis, unspecified organism    Cough    Upper respiratory infection with cough  and congestion    Other orders  -     albuterol (VENTOLIN HFA) 90 mcg/actuation inhaler; Inhale 2 puffs into the lungs every 6 (six) hours as needed for Wheezing (and cough). Rescue  Dispense: 18 g; Refill: 0  -     promethazine-dextromethorphan (PROMETHAZINE-DM) 6.25-15 mg/5 mL Syrp; Take 5 mLs by mouth nightly as needed (cough that keeps you awake at night).  Dispense: 118 mL; Refill: 0

## 2019-11-04 PROBLEM — Z3A.27 27 WEEKS GESTATION OF PREGNANCY: Status: RESOLVED | Noted: 2018-11-14 | Resolved: 2019-11-04

## 2020-02-04 ENCOUNTER — TELEPHONE (OUTPATIENT)
Dept: OBSTETRICS AND GYNECOLOGY | Facility: CLINIC | Age: 30
End: 2020-02-04

## 2020-02-04 DIAGNOSIS — N91.2 AMENORRHEA: Primary | ICD-10-CM

## 2020-02-04 NOTE — TELEPHONE ENCOUNTER
----- Message from Jamison Santos sent at 2/4/2020 10:23 AM CST -----  Can you put in order for dating U/S      lmp 12/31/2019

## 2020-02-10 ENCOUNTER — TELEPHONE (OUTPATIENT)
Dept: OBSTETRICS AND GYNECOLOGY | Facility: CLINIC | Age: 30
End: 2020-02-10

## 2020-02-15 ENCOUNTER — HOSPITAL ENCOUNTER (EMERGENCY)
Facility: OTHER | Age: 30
Discharge: HOME | End: 2020-02-15
Attending: EMERGENCY MEDICINE
Payer: COMMERCIAL

## 2020-02-15 VITALS
TEMPERATURE: 98 F | BODY MASS INDEX: 21.09 KG/M2 | DIASTOLIC BLOOD PRESSURE: 58 MMHG | OXYGEN SATURATION: 98 % | SYSTOLIC BLOOD PRESSURE: 106 MMHG | WEIGHT: 115.31 LBS | RESPIRATION RATE: 16 BRPM | HEART RATE: 84 BPM

## 2020-02-15 DIAGNOSIS — E87.1 HYPONATREMIA: ICD-10-CM

## 2020-02-15 DIAGNOSIS — O21.9 VOMITING DURING PREGNANCY: Primary | ICD-10-CM

## 2020-02-15 DIAGNOSIS — E87.29 STARVATION KETOACIDOSIS: ICD-10-CM

## 2020-02-15 DIAGNOSIS — E16.2 HYPOGLYCEMIA: ICD-10-CM

## 2020-02-15 DIAGNOSIS — T73.0XXA STARVATION KETOACIDOSIS: ICD-10-CM

## 2020-02-15 LAB
ALBUMIN SERPL BCP-MCNC: 4 G/DL (ref 3.5–5.2)
ALP SERPL-CCNC: 50 U/L (ref 55–135)
ALT SERPL W/O P-5'-P-CCNC: 17 U/L (ref 10–44)
ANION GAP SERPL CALC-SCNC: 14 MMOL/L (ref 8–16)
AST SERPL-CCNC: 24 U/L (ref 10–40)
B-HCG UR QL: POSITIVE
BASOPHILS # BLD AUTO: 0.04 K/UL (ref 0–0.2)
BASOPHILS NFR BLD: 0.3 % (ref 0–1.9)
BILIRUB SERPL-MCNC: 1.1 MG/DL (ref 0.1–1)
BILIRUB UR QL STRIP: NEGATIVE
BUN SERPL-MCNC: 14 MG/DL (ref 6–20)
CALCIUM SERPL-MCNC: 8.7 MG/DL (ref 8.7–10.5)
CHLORIDE SERPL-SCNC: 104 MMOL/L (ref 95–110)
CLARITY UR: ABNORMAL
CO2 SERPL-SCNC: 16 MMOL/L (ref 23–29)
COLOR UR: YELLOW
CREAT SERPL-MCNC: 0.7 MG/DL (ref 0.5–1.4)
CTP QC/QA: YES
DIFFERENTIAL METHOD: ABNORMAL
EOSINOPHIL # BLD AUTO: 0 K/UL (ref 0–0.5)
EOSINOPHIL NFR BLD: 0.1 % (ref 0–8)
ERYTHROCYTE [DISTWIDTH] IN BLOOD BY AUTOMATED COUNT: 11.8 % (ref 11.5–14.5)
EST. GFR  (AFRICAN AMERICAN): >60 ML/MIN/1.73 M^2
EST. GFR  (NON AFRICAN AMERICAN): >60 ML/MIN/1.73 M^2
GLUCOSE SERPL-MCNC: 64 MG/DL (ref 70–110)
GLUCOSE UR QL STRIP: NEGATIVE
HCG INTACT+B SERPL-ACNC: NORMAL MIU/ML
HCT VFR BLD AUTO: 38.8 % (ref 37–48.5)
HGB BLD-MCNC: 13.2 G/DL (ref 12–16)
HGB UR QL STRIP: ABNORMAL
IMM GRANULOCYTES # BLD AUTO: 0.04 K/UL (ref 0–0.04)
IMM GRANULOCYTES NFR BLD AUTO: 0.3 % (ref 0–0.5)
KETONES UR QL STRIP: ABNORMAL
LEUKOCYTE ESTERASE UR QL STRIP: NEGATIVE
LIPASE SERPL-CCNC: 45 U/L (ref 4–60)
LYMPHOCYTES # BLD AUTO: 1.5 K/UL (ref 1–4.8)
LYMPHOCYTES NFR BLD: 12.7 % (ref 18–48)
MCH RBC QN AUTO: 30.3 PG (ref 27–31)
MCHC RBC AUTO-ENTMCNC: 34 G/DL (ref 32–36)
MCV RBC AUTO: 89 FL (ref 82–98)
MONOCYTES # BLD AUTO: 0.7 K/UL (ref 0.3–1)
MONOCYTES NFR BLD: 5.5 % (ref 4–15)
NEUTROPHILS # BLD AUTO: 9.5 K/UL (ref 1.8–7.7)
NEUTROPHILS NFR BLD: 81.1 % (ref 38–73)
NITRITE UR QL STRIP: NEGATIVE
NRBC BLD-RTO: 0 /100 WBC
PH UR STRIP: 6 [PH] (ref 5–8)
PLATELET # BLD AUTO: 285 K/UL (ref 150–350)
PMV BLD AUTO: 9.5 FL (ref 9.2–12.9)
POCT GLUCOSE: 51 MG/DL (ref 70–110)
POCT GLUCOSE: 91 MG/DL (ref 70–110)
POTASSIUM SERPL-SCNC: 4.9 MMOL/L (ref 3.5–5.1)
PROT SERPL-MCNC: 7.5 G/DL (ref 6–8.4)
PROT UR QL STRIP: NEGATIVE
RBC # BLD AUTO: 4.35 M/UL (ref 4–5.4)
SODIUM SERPL-SCNC: 134 MMOL/L (ref 136–145)
SP GR UR STRIP: >=1.03 (ref 1–1.03)
URN SPEC COLLECT METH UR: ABNORMAL
UROBILINOGEN UR STRIP-ACNC: NEGATIVE EU/DL
WBC # BLD AUTO: 11.72 K/UL (ref 3.9–12.7)

## 2020-02-15 PROCEDURE — 82962 GLUCOSE BLOOD TEST: CPT | Mod: 91

## 2020-02-15 PROCEDURE — 80053 COMPREHEN METABOLIC PANEL: CPT

## 2020-02-15 PROCEDURE — 96361 HYDRATE IV INFUSION ADD-ON: CPT

## 2020-02-15 PROCEDURE — 96374 THER/PROPH/DIAG INJ IV PUSH: CPT

## 2020-02-15 PROCEDURE — 85025 COMPLETE CBC W/AUTO DIFF WBC: CPT

## 2020-02-15 PROCEDURE — 81003 URINALYSIS AUTO W/O SCOPE: CPT

## 2020-02-15 PROCEDURE — 99284 EMERGENCY DEPT VISIT MOD MDM: CPT | Mod: 25

## 2020-02-15 PROCEDURE — 84702 CHORIONIC GONADOTROPIN TEST: CPT

## 2020-02-15 PROCEDURE — 25000003 PHARM REV CODE 250: Performed by: PHYSICIAN ASSISTANT

## 2020-02-15 PROCEDURE — S0028 INJECTION, FAMOTIDINE, 20 MG: HCPCS | Performed by: PHYSICIAN ASSISTANT

## 2020-02-15 PROCEDURE — 81025 URINE PREGNANCY TEST: CPT | Performed by: EMERGENCY MEDICINE

## 2020-02-15 PROCEDURE — 83690 ASSAY OF LIPASE: CPT

## 2020-02-15 PROCEDURE — 63600175 PHARM REV CODE 636 W HCPCS: Performed by: PHYSICIAN ASSISTANT

## 2020-02-15 RX ORDER — FAMOTIDINE 10 MG/ML
20 INJECTION INTRAVENOUS
Status: COMPLETED | OUTPATIENT
Start: 2020-02-15 | End: 2020-02-15

## 2020-02-15 RX ORDER — DOXYLAMINE SUCCINATE AND PYRIDOXINE HYDROCHLORIDE, DELAYED RELEASE TABLETS 10 MG/10 MG 10; 10 MG/1; MG/1
2 TABLET, DELAYED RELEASE ORAL NIGHTLY
Qty: 60 TABLET | Refills: 0 | Status: SHIPPED | OUTPATIENT
Start: 2020-02-15 | End: 2020-06-18

## 2020-02-15 RX ORDER — METOCLOPRAMIDE 10 MG/1
10 TABLET ORAL EVERY 6 HOURS PRN
Qty: 20 TABLET | Refills: 0 | Status: SHIPPED | OUTPATIENT
Start: 2020-02-15 | End: 2020-03-04 | Stop reason: SDUPTHER

## 2020-02-15 RX ADMIN — FAMOTIDINE 20 MG: 10 INJECTION, SOLUTION INTRAVENOUS at 07:02

## 2020-02-15 RX ADMIN — SODIUM CHLORIDE 1000 ML: 0.9 INJECTION, SOLUTION INTRAVENOUS at 06:02

## 2020-02-16 NOTE — ED TRIAGE NOTES
Pt arrived with c/o nausea and vomiting x 3 days.  Vomited twice today, denies diarrhea or fever.  Reports epigastric pain starting today.  Pt reports positive pregnancy test 10 days ago, LMP 2019.  States she is dizzy and lightheaded due to lack of appetite.  Has been unable to get OBGYN appointment yet.  Reports this is her second pregnancy, previous pregnancy   delivery.  Denies any vaginal bleeding.

## 2020-02-16 NOTE — ED NOTES
AAOx4. Calm and cooperative. States nausea has improved. CBG=51. Pt given juice and crackers; pt encouraged to consume po as tolerated. Pt informed CBG will be rechecked in 30 mins. Pt verbalized understanding. Skin is warm and dry. Resp:16 even and unlabored. Spouse at BS. NADN. Two warm blankets provided. Recliner wheels locked and call light within reach.

## 2020-02-16 NOTE — ED PROVIDER NOTES
Encounter Date: 2/15/2020       History     Chief Complaint   Patient presents with    Nausea     pt with c/o nause  vomitng x 4 days. pt denies diarrhea. positive home pg test .     Vomiting     Patient is a 29-year-old  female, approximately 6 weeks gestational age, presenting to the emergency department with nausea and vomiting.  Patient states she had a positive home pregnancy test on .  Patient reports nausea and vomiting for the past 2-3 days.  She also reports decreased appetite.  Patient's  states he gave her a tablet of meclizine 1 hr prior to arrival.  Patient states this has improved her nausea and she has not had any further episodes of emesis.  Patient states she had upper abdominal pain after vomiting today.  She denies pelvic pain or vaginal bleeding.  She denies fever or urinary symptoms. Patient states she has also been feeling lightheaded for the past couple of days and attributes this to not eating and drinking normally.    The history is provided by the patient.     Review of patient's allergies indicates:  No Known Allergies  Past Medical History:   Diagnosis Date    Anemia      Past Surgical History:   Procedure Laterality Date     SECTION N/A 2018    Procedure:  SECTION;  Surgeon: Ina Harper DO;  Location: [facility] L&D;  Service: OB/GYN;  Laterality: N/A;     Family History   Problem Relation Age of Onset    Cardiomyopathy Neg Hx     Arrhythmia Neg Hx     Congenital heart disease Neg Hx     Early death Neg Hx     Heart attacks under age 50 Neg Hx     Pacemaker/defibrilator Neg Hx      Social History     Tobacco Use    Smoking status: Never Smoker    Smokeless tobacco: Never Used   Substance Use Topics    Alcohol use: No    Drug use: No     Review of Systems   Constitutional: Negative for chills and fever.   HENT: Negative for congestion and sore throat.    Respiratory: Negative for shortness of breath.    Cardiovascular: Negative  for chest pain.   Gastrointestinal: Positive for abdominal pain (Upper abdomen), nausea and vomiting. Negative for diarrhea.   Genitourinary: Negative for dysuria, pelvic pain and vaginal bleeding.   Musculoskeletal: Negative for back pain.   Skin: Negative for rash.   Allergic/Immunologic: Negative for immunocompromised state.   Neurological: Positive for light-headedness. Negative for headaches.       Physical Exam     Initial Vitals   BP Pulse Resp Temp SpO2   02/15/20 1806 02/15/20 1806 02/15/20 1806 02/15/20 1831 02/15/20 1806   103/66 73 18 98.5 °F (36.9 °C) 100 %      MAP       --                Physical Exam    Vitals reviewed.  Constitutional: Vital signs are normal. She is cooperative. No distress.   HENT:   Head: Normocephalic and atraumatic.   Mildly dry oral mucosa   Eyes: Conjunctivae and EOM are normal.   Neck: Normal range of motion. Neck supple.   Cardiovascular: Normal rate, regular rhythm and intact distal pulses.   Pulmonary/Chest: Breath sounds normal. She has no wheezes. She has no rhonchi. She has no rales.   Abdominal: Soft. Bowel sounds are normal. There is no tenderness. There is no rebound and no guarding.   Musculoskeletal: Normal range of motion.   Neurological: She is alert and oriented to person, place, and time. GCS eye subscore is 4. GCS verbal subscore is 5. GCS motor subscore is 6.   Skin: Skin is warm and dry. No rash noted.         ED Course   Procedures  Labs Reviewed   CBC W/ AUTO DIFFERENTIAL - Abnormal; Notable for the following components:       Result Value    Gran # (ANC) 9.5 (*)     Gran% 81.1 (*)     Lymph% 12.7 (*)     All other components within normal limits   COMPREHENSIVE METABOLIC PANEL - Abnormal; Notable for the following components:    Sodium 134 (*)     CO2 16 (*)     Glucose 64 (*)     Total Bilirubin 1.1 (*)     Alkaline Phosphatase 50 (*)     All other components within normal limits   URINALYSIS, REFLEX TO URINE CULTURE - Abnormal; Notable for the following  components:    Appearance, UA Hazy (*)     Specific Gravity, UA >=1.030 (*)     Ketones, UA 2+ (*)     Occult Blood UA Trace (*)     All other components within normal limits    Narrative:     Preferred Collection Type->Urine, Clean Catch   POCT URINE PREGNANCY - Abnormal; Notable for the following components:    POC Preg Test, Ur Positive (*)     All other components within normal limits   POCT GLUCOSE - Abnormal; Notable for the following components:    POCT Glucose 51 (*)     All other components within normal limits   LIPASE   HCG, QUANTITATIVE, PREGNANCY   POCT GLUCOSE   POCT GLUCOSE MONITORING CONTINUOUS          Imaging Results    None          Medical Decision Making:   Initial Assessment:   Urgent evaluation of a 29 y.o. female presenting to the emergency department complaining of nausea and vomiting during early pregnancy. Patient is afebrile, nontoxic appearing and hemodynamically stable.  - patient has no signs of acute abdomen on exam.  She does appear mildly dehydrated.  Her nausea is improving as she took a tablet of Reglan prior to arrival.  She has not had a confirmed IUP.  But she is not exhibiting signs or symptoms to suggest ectopic pregnancy.  Will provide patient IV hydration obtain lab work to rule metabolic disturbance.  Clinical Tests:   Lab Tests: Ordered and Reviewed  ED Management:  Chemistry reveals hypoglycemia, glucose is 64, corrected sodium is 133.  Bicarb is 16.  Normal anion gap.  -patient was given juice.  Repeat Accu-Chek after patient had small amount of juice is 51.  She was encouraged to finish drinking juice and given Erasmo crackers.  Will continue to monitor.  -patient's labs were consistent with fasting/starvation ketoacidosis.  Urinalysis reveals 2+ ketones without evidence of UTI.  -patient passed p.o. challenge without difficulty.  Repeat Accu-Chek is 91.  -patient will be sent home with Diclegis and Reglan.    - patient was given strict return precautions to the  emergency department.  She had no other complaints today and was stable at discharge. Patient has standing dating ultrasound ordered.                                 Clinical Impression:     1. Vomiting during pregnancy    2. Starvation ketoacidosis    3. Hypoglycemia    4. Hyponatremia                            Manjinder Cherry PA-C  02/15/20 2056

## 2020-02-16 NOTE — ED NOTES
Pt provided with cup of juice for PO challenge and low blood sugar, was instructed to sip over about 15 minutes.

## 2020-02-21 ENCOUNTER — HOSPITAL ENCOUNTER (EMERGENCY)
Facility: OTHER | Age: 30
Discharge: HOME | End: 2020-02-21
Attending: EMERGENCY MEDICINE
Payer: COMMERCIAL

## 2020-02-21 VITALS
WEIGHT: 116.88 LBS | TEMPERATURE: 98 F | OXYGEN SATURATION: 100 % | RESPIRATION RATE: 16 BRPM | HEIGHT: 63 IN | DIASTOLIC BLOOD PRESSURE: 64 MMHG | HEART RATE: 67 BPM | BODY MASS INDEX: 20.71 KG/M2 | SYSTOLIC BLOOD PRESSURE: 110 MMHG

## 2020-02-21 DIAGNOSIS — O26.899 PREGNANCY RELATED NAUSEA, ANTEPARTUM: ICD-10-CM

## 2020-02-21 DIAGNOSIS — T73.0XXA STARVATION KETOACIDOSIS: ICD-10-CM

## 2020-02-21 DIAGNOSIS — E87.29 STARVATION KETOACIDOSIS: ICD-10-CM

## 2020-02-21 DIAGNOSIS — R11.0 PREGNANCY RELATED NAUSEA, ANTEPARTUM: ICD-10-CM

## 2020-02-21 DIAGNOSIS — E86.0 DEHYDRATION: Primary | ICD-10-CM

## 2020-02-21 LAB
ALBUMIN SERPL BCP-MCNC: 4.3 G/DL (ref 3.5–5.2)
ALP SERPL-CCNC: 49 U/L (ref 55–135)
ALT SERPL W/O P-5'-P-CCNC: 12 U/L (ref 10–44)
ANION GAP SERPL CALC-SCNC: 10 MMOL/L (ref 8–16)
AST SERPL-CCNC: 13 U/L (ref 10–40)
B-HCG UR QL: POSITIVE
B-OH-BUTYR BLD STRIP-SCNC: 1.9 MMOL/L (ref 0–0.5)
BASOPHILS # BLD AUTO: 0.04 K/UL (ref 0–0.2)
BASOPHILS NFR BLD: 0.3 % (ref 0–1.9)
BILIRUB SERPL-MCNC: 0.9 MG/DL (ref 0.1–1)
BILIRUB UR QL STRIP: NEGATIVE
BUN SERPL-MCNC: 9 MG/DL (ref 6–20)
CALCIUM SERPL-MCNC: 9.4 MG/DL (ref 8.7–10.5)
CHLORIDE SERPL-SCNC: 103 MMOL/L (ref 95–110)
CLARITY UR: CLEAR
CO2 SERPL-SCNC: 21 MMOL/L (ref 23–29)
COLOR UR: YELLOW
CREAT SERPL-MCNC: 0.6 MG/DL (ref 0.5–1.4)
CTP QC/QA: YES
DIFFERENTIAL METHOD: ABNORMAL
EOSINOPHIL # BLD AUTO: 0 K/UL (ref 0–0.5)
EOSINOPHIL NFR BLD: 0.1 % (ref 0–8)
ERYTHROCYTE [DISTWIDTH] IN BLOOD BY AUTOMATED COUNT: 11.8 % (ref 11.5–14.5)
EST. GFR  (AFRICAN AMERICAN): >60 ML/MIN/1.73 M^2
EST. GFR  (NON AFRICAN AMERICAN): >60 ML/MIN/1.73 M^2
GLUCOSE SERPL-MCNC: 70 MG/DL (ref 70–110)
GLUCOSE UR QL STRIP: NEGATIVE
HCT VFR BLD AUTO: 40.8 % (ref 37–48.5)
HGB BLD-MCNC: 13.7 G/DL (ref 12–16)
HGB UR QL STRIP: ABNORMAL
IMM GRANULOCYTES # BLD AUTO: 0.05 K/UL (ref 0–0.04)
IMM GRANULOCYTES NFR BLD AUTO: 0.4 % (ref 0–0.5)
KETONES UR QL STRIP: ABNORMAL
LEUKOCYTE ESTERASE UR QL STRIP: NEGATIVE
LYMPHOCYTES # BLD AUTO: 1.5 K/UL (ref 1–4.8)
LYMPHOCYTES NFR BLD: 10.7 % (ref 18–48)
MAGNESIUM SERPL-MCNC: 2.7 MG/DL (ref 1.6–2.6)
MCH RBC QN AUTO: 29.7 PG (ref 27–31)
MCHC RBC AUTO-ENTMCNC: 33.6 G/DL (ref 32–36)
MCV RBC AUTO: 89 FL (ref 82–98)
MONOCYTES # BLD AUTO: 0.6 K/UL (ref 0.3–1)
MONOCYTES NFR BLD: 4.1 % (ref 4–15)
NEUTROPHILS # BLD AUTO: 11.5 K/UL (ref 1.8–7.7)
NEUTROPHILS NFR BLD: 84.4 % (ref 38–73)
NITRITE UR QL STRIP: NEGATIVE
NRBC BLD-RTO: 0 /100 WBC
PH UR STRIP: 6 [PH] (ref 5–8)
PHOSPHATE SERPL-MCNC: 3.1 MG/DL (ref 2.7–4.5)
PLATELET # BLD AUTO: 292 K/UL (ref 150–350)
PMV BLD AUTO: 10 FL (ref 9.2–12.9)
POCT GLUCOSE: 90 MG/DL (ref 70–110)
POTASSIUM SERPL-SCNC: 4 MMOL/L (ref 3.5–5.1)
PROT SERPL-MCNC: 7.7 G/DL (ref 6–8.4)
PROT UR QL STRIP: NEGATIVE
RBC # BLD AUTO: 4.61 M/UL (ref 4–5.4)
SODIUM SERPL-SCNC: 134 MMOL/L (ref 136–145)
SP GR UR STRIP: >=1.03 (ref 1–1.03)
URN SPEC COLLECT METH UR: ABNORMAL
UROBILINOGEN UR STRIP-ACNC: NEGATIVE EU/DL
WBC # BLD AUTO: 13.61 K/UL (ref 3.9–12.7)

## 2020-02-21 PROCEDURE — 99284 EMERGENCY DEPT VISIT MOD MDM: CPT | Mod: 25

## 2020-02-21 PROCEDURE — 82010 KETONE BODYS QUAN: CPT

## 2020-02-21 PROCEDURE — 63600175 PHARM REV CODE 636 W HCPCS: Performed by: EMERGENCY MEDICINE

## 2020-02-21 PROCEDURE — 80053 COMPREHEN METABOLIC PANEL: CPT

## 2020-02-21 PROCEDURE — 81025 URINE PREGNANCY TEST: CPT | Performed by: EMERGENCY MEDICINE

## 2020-02-21 PROCEDURE — 84100 ASSAY OF PHOSPHORUS: CPT

## 2020-02-21 PROCEDURE — 85025 COMPLETE CBC W/AUTO DIFF WBC: CPT

## 2020-02-21 PROCEDURE — 81003 URINALYSIS AUTO W/O SCOPE: CPT

## 2020-02-21 PROCEDURE — 96365 THER/PROPH/DIAG IV INF INIT: CPT

## 2020-02-21 PROCEDURE — 83735 ASSAY OF MAGNESIUM: CPT

## 2020-02-21 PROCEDURE — 82962 GLUCOSE BLOOD TEST: CPT

## 2020-02-21 PROCEDURE — 96361 HYDRATE IV INFUSION ADD-ON: CPT

## 2020-02-21 RX ADMIN — SODIUM CHLORIDE 1000 ML: 0.9 INJECTION, SOLUTION INTRAVENOUS at 09:02

## 2020-02-21 RX ADMIN — SODIUM CHLORIDE 1000 ML: 0.9 INJECTION, SOLUTION INTRAVENOUS at 07:02

## 2020-02-21 RX ADMIN — PROMETHAZINE HYDROCHLORIDE 25 MG: 25 INJECTION INTRAMUSCULAR; INTRAVENOUS at 08:02

## 2020-02-22 NOTE — ED PROVIDER NOTES
Encounter Date: 2020    SCRIBE #1 NOTE: I, Carina Avitia, am scribing for, and in the presence of, Dr. Cyr.       History     Chief Complaint   Patient presents with    Dizziness     reports she is 6 weeks pregnant, hasn't been eating much due to nausea    Nausea     no vomiting     Time seen by provider: 6:28 PM    This is a 29 y.o. female,  at approximately 6 weeks gestation, who presents with complaint of nausea for over one week. She was seen here six days ago for the same complaint and found to be hypoglycemic. She has taken meclizine and Reglan at home with no relief.  She did not fill prescription for Diclegis that was prescribed.  She reports loss of appetite for the past few days, dizziness upon waking up this morning, and intermittent sore throat. She denies fever, chills, congestion, rhinorrhea, cough, abdominal pain, vomiting, dysuria, or decreased urine output.  She states, I am not eating or drinking at all because I am scared of what will happen if I do.    The history is provided by the patient, medical records and the spouse.     Review of patient's allergies indicates:  No Known Allergies  Past Medical History:   Diagnosis Date    Anemia      Past Surgical History:   Procedure Laterality Date     SECTION N/A 2018    Procedure:  SECTION;  Surgeon: Ina Harper DO;  [facility];  Service: OB/GYN;  Laterality: N/A;     Family History   Problem Relation Age of Onset    Cardiomyopathy Neg Hx     Arrhythmia Neg Hx     Congenital heart disease Neg Hx     Early death Neg Hx     Heart attacks under age 50 Neg Hx     Pacemaker/defibrilator Neg Hx      Social History     Tobacco Use    Smoking status: Never Smoker    Smokeless tobacco: Never Used   Substance Use Topics    Alcohol use: No    Drug use: No     Review of Systems   Constitutional: Positive for appetite change. Negative for chills and fever.   HENT: Positive for sore throat. Negative  for congestion and rhinorrhea.    Eyes: Negative for visual disturbance.   Respiratory: Negative for cough and shortness of breath.    Cardiovascular: Negative for chest pain and palpitations.   Gastrointestinal: Positive for nausea. Negative for abdominal pain, diarrhea and vomiting.   Genitourinary: Negative for decreased urine volume, dysuria and vaginal discharge.   Musculoskeletal: Negative for joint swelling, neck pain and neck stiffness.   Skin: Negative for rash and wound.   Neurological: Positive for dizziness. Negative for weakness, numbness and headaches.   Psychiatric/Behavioral: Negative for confusion.       Physical Exam     Initial Vitals [02/21/20 1737]   BP Pulse Resp Temp SpO2   (!) 107/58 72 18 98.1 °F (36.7 °C) 98 %      MAP       --         Physical Exam    Nursing note and vitals reviewed.  Constitutional: She appears well-developed and well-nourished. She is not diaphoretic. No distress.   HENT:   Head: Normocephalic and atraumatic.   Right Ear: Tympanic membrane normal.   Left Ear: Tympanic membrane normal.   Nose: Nose normal.   Mouth/Throat: Oropharynx is clear and moist.   Eyes: Conjunctivae and EOM are normal. Pupils are equal, round, and reactive to light. No scleral icterus.   Neck: Normal range of motion. Neck supple.   Cardiovascular: Normal rate, regular rhythm and normal heart sounds. Exam reveals no gallop and no friction rub.    No murmur heard.  Pulmonary/Chest: Breath sounds normal. No respiratory distress. She has no wheezes. She has no rhonchi. She has no rales.   Abdominal: Soft. Bowel sounds are normal. She exhibits no distension. There is no tenderness. There is no rebound and no guarding.   Musculoskeletal: Normal range of motion. She exhibits no edema or tenderness.   Neurological: She is alert and oriented to person, place, and time. She has normal strength. GCS score is 15. GCS eye subscore is 4. GCS verbal subscore is 5. GCS motor subscore is 6.   Skin: Skin is warm  and dry.   Psychiatric: She has a normal mood and affect. Her behavior is normal. Judgment and thought content normal.         ED Course   Procedures  Labs Reviewed   URINALYSIS, REFLEX TO URINE CULTURE - Abnormal; Notable for the following components:       Result Value    Specific Gravity, UA >=1.030 (*)     Ketones, UA 3+ (*)     Occult Blood UA Trace (*)     All other components within normal limits    Narrative:     Preferred Collection Type->Urine, Clean Catch   CBC W/ AUTO DIFFERENTIAL - Abnormal; Notable for the following components:    WBC 13.61 (*)     Gran # (ANC) 11.5 (*)     Immature Grans (Abs) 0.05 (*)     Gran% 84.4 (*)     Lymph% 10.7 (*)     All other components within normal limits   COMPREHENSIVE METABOLIC PANEL - Abnormal; Notable for the following components:    Sodium 134 (*)     CO2 21 (*)     Alkaline Phosphatase 49 (*)     All other components within normal limits   MAGNESIUM - Abnormal; Notable for the following components:    Magnesium 2.7 (*)     All other components within normal limits   BETA - HYDROXYBUTYRATE, SERUM - Abnormal; Notable for the following components:    Beta-Hydroxybutyrate 1.9 (*)     All other components within normal limits   POCT URINE PREGNANCY - Abnormal; Notable for the following components:    POC Preg Test, Ur Positive (*)     All other components within normal limits   PHOSPHORUS   POCT GLUCOSE          Imaging Results    None          Medical Decision Making:   History:   Old Medical Records: I decided to obtain old medical records.  Clinical Tests:   Lab Tests: Ordered and Reviewed  ED Management:  Emergent evaluation a 29-year-old female who presents with complaint of nausea in pregnancy, now lightheaded and dizzy.  Vital signs reveal mild hypotension, afebrile.  I suspect this blood pressure is near normal given her thin body habitus and pregnant state.  Physical exam is benign.  Workup concerning for significant ketonemia and ketonuria.  She was hydrated  in after 1 L IV fluids did produce a urine sample which is concentrated with elevated specific gravity.  There is no major electrolyte disturbance.  Patient was counseled that she must take the Diclegis and prenatal vitamins, and she must force herself to eat and drink.  She is encouraged to follow closely with her OBGYN or to return for new or worsening symptoms. She did tolerate a by mouth challenge of juice in the ED.            Scribe Attestation:   Scribe #1: I performed the above scribed service and the documentation accurately describes the services I performed. I attest to the accuracy of the note.    Attending Attestation:           Physician Attestation for Scribe:  Physician Attestation Statement for Scribe #1: I, Dr. Cyr, reviewed documentation, as scribed by Carina Avitia in my presence, and it is both accurate and complete.                 ED Course as of Feb 21 2205 Fri Feb 21, 2020 2057 Discussed case with OBGYN.    [AC]      ED Course User Index  [AC] Carina Avitia                Clinical Impression:     1. Dehydration    2. Pregnancy related nausea, antepartum    3. Starvation ketoacidosis                              Niki Cyr MD  02/21/20 2206

## 2020-02-22 NOTE — DISCHARGE INSTRUCTIONS
Take a daily prenatal vitamin.  Take Diclegis medication each day to help prevent nausea.  You must eat and drink if you wish to have a healthy pregnancy.  Return to the emergency room for new or worsening symptoms.

## 2020-03-04 ENCOUNTER — TELEPHONE (OUTPATIENT)
Dept: OBSTETRICS AND GYNECOLOGY | Facility: CLINIC | Age: 30
End: 2020-03-04

## 2020-03-04 ENCOUNTER — CLINICAL SUPPORT (OUTPATIENT)
Dept: OBSTETRICS AND GYNECOLOGY | Facility: CLINIC | Age: 30
End: 2020-03-04
Payer: COMMERCIAL

## 2020-03-04 ENCOUNTER — PATIENT MESSAGE (OUTPATIENT)
Dept: OBSTETRICS AND GYNECOLOGY | Facility: CLINIC | Age: 30
End: 2020-03-04

## 2020-03-04 ENCOUNTER — OFFICE VISIT (OUTPATIENT)
Dept: OBSTETRICS AND GYNECOLOGY | Facility: CLINIC | Age: 30
End: 2020-03-04
Payer: COMMERCIAL

## 2020-03-04 ENCOUNTER — LAB VISIT (OUTPATIENT)
Dept: LAB | Facility: OTHER | Age: 30
End: 2020-03-04
Attending: NURSE PRACTITIONER
Payer: COMMERCIAL

## 2020-03-04 ENCOUNTER — PROCEDURE VISIT (OUTPATIENT)
Dept: OBSTETRICS AND GYNECOLOGY | Facility: CLINIC | Age: 30
End: 2020-03-04
Payer: COMMERCIAL

## 2020-03-04 VITALS — HEIGHT: 63 IN | DIASTOLIC BLOOD PRESSURE: 70 MMHG | BODY MASS INDEX: 20.7 KG/M2 | SYSTOLIC BLOOD PRESSURE: 108 MMHG

## 2020-03-04 DIAGNOSIS — O09.891 HISTORY OF PRETERM DELIVERY, CURRENTLY PREGNANT IN FIRST TRIMESTER: ICD-10-CM

## 2020-03-04 DIAGNOSIS — Z84.3 CONSANGUINITY: ICD-10-CM

## 2020-03-04 DIAGNOSIS — N91.2 AMENORRHEA: ICD-10-CM

## 2020-03-04 DIAGNOSIS — Z32.01 POSITIVE PREGNANCY TEST: ICD-10-CM

## 2020-03-04 DIAGNOSIS — O21.9 NAUSEA AND VOMITING DURING PREGNANCY: ICD-10-CM

## 2020-03-04 DIAGNOSIS — Z23 FLU VACCINE NEED: Primary | ICD-10-CM

## 2020-03-04 DIAGNOSIS — Z36.89 ESTABLISH GESTATIONAL AGE, ULTRASOUND: ICD-10-CM

## 2020-03-04 DIAGNOSIS — N91.2 AMENORRHEA: Primary | ICD-10-CM

## 2020-03-04 DIAGNOSIS — Z23 NEEDS FLU SHOT: ICD-10-CM

## 2020-03-04 LAB
ABO + RH BLD: NORMAL
ANION GAP SERPL CALC-SCNC: 9 MMOL/L (ref 8–16)
B-HCG UR QL: POSITIVE
BASOPHILS # BLD AUTO: 0.05 K/UL (ref 0–0.2)
BASOPHILS NFR BLD: 0.5 % (ref 0–1.9)
BLD GP AB SCN CELLS X3 SERPL QL: NORMAL
BUN SERPL-MCNC: 5 MG/DL (ref 6–20)
CALCIUM SERPL-MCNC: 9.3 MG/DL (ref 8.7–10.5)
CHLORIDE SERPL-SCNC: 103 MMOL/L (ref 95–110)
CO2 SERPL-SCNC: 24 MMOL/L (ref 23–29)
CREAT SERPL-MCNC: 0.6 MG/DL (ref 0.5–1.4)
CTP QC/QA: YES
DIFFERENTIAL METHOD: NORMAL
EOSINOPHIL # BLD AUTO: 0.1 K/UL (ref 0–0.5)
EOSINOPHIL NFR BLD: 0.8 % (ref 0–8)
ERYTHROCYTE [DISTWIDTH] IN BLOOD BY AUTOMATED COUNT: 12.1 % (ref 11.5–14.5)
EST. GFR  (AFRICAN AMERICAN): >60 ML/MIN/1.73 M^2
EST. GFR  (NON AFRICAN AMERICAN): >60 ML/MIN/1.73 M^2
GLUCOSE SERPL-MCNC: 61 MG/DL (ref 70–110)
HCT VFR BLD AUTO: 37.9 % (ref 37–48.5)
HGB BLD-MCNC: 12.3 G/DL (ref 12–16)
IMM GRANULOCYTES # BLD AUTO: 0.03 K/UL (ref 0–0.04)
IMM GRANULOCYTES NFR BLD AUTO: 0.3 % (ref 0–0.5)
LYMPHOCYTES # BLD AUTO: 2.3 K/UL (ref 1–4.8)
LYMPHOCYTES NFR BLD: 23.5 % (ref 18–48)
MCH RBC QN AUTO: 29 PG (ref 27–31)
MCHC RBC AUTO-ENTMCNC: 32.5 G/DL (ref 32–36)
MCV RBC AUTO: 89 FL (ref 82–98)
MONOCYTES # BLD AUTO: 0.6 K/UL (ref 0.3–1)
MONOCYTES NFR BLD: 6.6 % (ref 4–15)
NEUTROPHILS # BLD AUTO: 6.6 K/UL (ref 1.8–7.7)
NEUTROPHILS NFR BLD: 68.3 % (ref 38–73)
NRBC BLD-RTO: 0 /100 WBC
PLATELET # BLD AUTO: 279 K/UL (ref 150–350)
PMV BLD AUTO: 10.7 FL (ref 9.2–12.9)
POTASSIUM SERPL-SCNC: 3.6 MMOL/L (ref 3.5–5.1)
RBC # BLD AUTO: 4.24 M/UL (ref 4–5.4)
SODIUM SERPL-SCNC: 136 MMOL/L (ref 136–145)
WBC # BLD AUTO: 9.71 K/UL (ref 3.9–12.7)

## 2020-03-04 PROCEDURE — 90471 IMMUNIZATION ADMIN: CPT | Mod: S$GLB,,, | Performed by: NURSE PRACTITIONER

## 2020-03-04 PROCEDURE — 81025 POCT URINE PREGNANCY: ICD-10-PCS | Mod: S$GLB,,, | Performed by: NURSE PRACTITIONER

## 2020-03-04 PROCEDURE — 86703 HIV-1/HIV-2 1 RESULT ANTBDY: CPT

## 2020-03-04 PROCEDURE — 86901 BLOOD TYPING SEROLOGIC RH(D): CPT

## 2020-03-04 PROCEDURE — 81025 URINE PREGNANCY TEST: CPT | Mod: S$GLB,,, | Performed by: NURSE PRACTITIONER

## 2020-03-04 PROCEDURE — 87491 CHLMYD TRACH DNA AMP PROBE: CPT

## 2020-03-04 PROCEDURE — 86762 RUBELLA ANTIBODY: CPT

## 2020-03-04 PROCEDURE — 99999 PR PBB SHADOW E&M-EST. PATIENT-LVL III: ICD-10-PCS | Mod: PBBFAC,,, | Performed by: NURSE PRACTITIONER

## 2020-03-04 PROCEDURE — 76801 OB US < 14 WKS SINGLE FETUS: CPT | Mod: S$GLB,,, | Performed by: OBSTETRICS & GYNECOLOGY

## 2020-03-04 PROCEDURE — 87340 HEPATITIS B SURFACE AG IA: CPT

## 2020-03-04 PROCEDURE — 87077 CULTURE AEROBIC IDENTIFY: CPT

## 2020-03-04 PROCEDURE — 99999 PR PBB SHADOW E&M-EST. PATIENT-LVL I: ICD-10-PCS | Mod: PBBFAC,,,

## 2020-03-04 PROCEDURE — 85025 COMPLETE CBC W/AUTO DIFF WBC: CPT

## 2020-03-04 PROCEDURE — 90686 IIV4 VACC NO PRSV 0.5 ML IM: CPT | Mod: S$GLB,,, | Performed by: NURSE PRACTITIONER

## 2020-03-04 PROCEDURE — 87186 SC STD MICRODIL/AGAR DIL: CPT

## 2020-03-04 PROCEDURE — 99999 PR PBB SHADOW E&M-EST. PATIENT-LVL III: CPT | Mod: PBBFAC,,, | Performed by: NURSE PRACTITIONER

## 2020-03-04 PROCEDURE — 3008F PR BODY MASS INDEX (BMI) DOCUMENTED: ICD-10-PCS | Mod: CPTII,S$GLB,, | Performed by: NURSE PRACTITIONER

## 2020-03-04 PROCEDURE — 76801 PR US, OB <14WKS, TRANSABD, SINGLE GESTATION: ICD-10-PCS | Mod: S$GLB,,, | Performed by: OBSTETRICS & GYNECOLOGY

## 2020-03-04 PROCEDURE — 80048 BASIC METABOLIC PNL TOTAL CA: CPT

## 2020-03-04 PROCEDURE — 87086 URINE CULTURE/COLONY COUNT: CPT

## 2020-03-04 PROCEDURE — 99214 OFFICE O/P EST MOD 30 MIN: CPT | Mod: 25,S$GLB,, | Performed by: NURSE PRACTITIONER

## 2020-03-04 PROCEDURE — 90686 FLU VACCINE (QUAD) GREATER THAN OR EQUAL TO 3YO PRESERVATIVE FREE IM: ICD-10-PCS | Mod: S$GLB,,, | Performed by: NURSE PRACTITIONER

## 2020-03-04 PROCEDURE — 90471 FLU VACCINE (QUAD) GREATER THAN OR EQUAL TO 3YO PRESERVATIVE FREE IM: ICD-10-PCS | Mod: S$GLB,,, | Performed by: NURSE PRACTITIONER

## 2020-03-04 PROCEDURE — 99214 PR OFFICE/OUTPT VISIT, EST, LEVL IV, 30-39 MIN: ICD-10-PCS | Mod: 25,S$GLB,, | Performed by: NURSE PRACTITIONER

## 2020-03-04 PROCEDURE — 3008F BODY MASS INDEX DOCD: CPT | Mod: CPTII,S$GLB,, | Performed by: NURSE PRACTITIONER

## 2020-03-04 PROCEDURE — 36415 COLL VENOUS BLD VENIPUNCTURE: CPT

## 2020-03-04 PROCEDURE — 83020 HEMOGLOBIN ELECTROPHORESIS: CPT

## 2020-03-04 PROCEDURE — 99999 PR PBB SHADOW E&M-EST. PATIENT-LVL I: CPT | Mod: PBBFAC,,,

## 2020-03-04 PROCEDURE — 86592 SYPHILIS TEST NON-TREP QUAL: CPT

## 2020-03-04 PROCEDURE — 87088 URINE BACTERIA CULTURE: CPT

## 2020-03-04 RX ORDER — METOCLOPRAMIDE 10 MG/1
10 TABLET ORAL EVERY 6 HOURS PRN
Qty: 20 TABLET | Refills: 0 | Status: SHIPPED | OUTPATIENT
Start: 2020-03-04 | End: 2020-03-16 | Stop reason: SDUPTHER

## 2020-03-04 RX ORDER — METOCLOPRAMIDE 10 MG/1
10 TABLET ORAL EVERY 6 HOURS PRN
Qty: 20 TABLET | Refills: 0 | Status: SHIPPED | OUTPATIENT
Start: 2020-03-04 | End: 2020-03-04

## 2020-03-04 NOTE — TELEPHONE ENCOUNTER
----- Message from Rowena Khan sent at 3/4/2020  3:54 PM CST -----  Contact: David Garcia (Spouse)  Type:  Patient Returning Call    Who Called: MARNIE KAYE [[id number]]    Who Left Message for Patient: Lainey    Does the patient know what this is regarding?: no    Best Call Back Number: [phone]    Additional Information:

## 2020-03-04 NOTE — TELEPHONE ENCOUNTER
I have attempted without success to contact this patient by phone [facility] mychart message sent appt scheduled

## 2020-03-04 NOTE — PROGRESS NOTES
Here for Influenza - Quadrivalent - PF (ADULT) vaccine . Vaccine Information sheet given to patient. Patient without complaint of pain prior to or after injection. Immunization tolerated well and patient advised to wait in lobby 15 minutes. Report any adverse reactions.    Site:  [facility]

## 2020-03-04 NOTE — PROGRESS NOTES
CC: Positive Pregnancy Test    HISTORY OF PRESENT ILLNESS:    Ximena Robbins is a 29 y.o. female, ,  Presents today for a routine exam complaining of amenorrhea and positive home urine pregnancy test.  Patient's last menstrual period was 2019.   She is not currently on any contraception. FOB present for visit via telephone, internal medicine resident with [facility].   Reports nausea. Reports breast tenderness. Denies vaginal bleeding and pelvic pain.    Significant nausea and vomiting during this pregnancy with multiple visits to ED, reports currently able to tolerate food/fluids, taking Vitamin B6/Unisom, Reglan, Antivert prn for symptoms. Able to tolerate PNV. She desires flu vaccination.    No reported personal medical history for patient and FOB. Consanguineous relationship- 1st cousin. History of prior  delivery of infant with fetal cardiac anomaly, fetal cerebral ventriculomegaly, child passed away at 6 months of age. History of  delivery.       ROS:  GENERAL: No weight changes. No swelling. No fatigue. No fever.  CARDIOVASCULAR: No chest pain. No shortness of breath. No leg cramps.   NEUROLOGICAL: No headaches. No vision changes.  BREASTS: No pain. No lumps. No discharge.  ABDOMEN: No pain. No diarrhea. No constipation. Nausea and vomiting.   REPRODUCTIVE: No abnormal bleeding.   VULVA: No pain. No lesions. No itching.  VAGINA: No relaxation. No itching. No odor. No discharge. No lesions.  URINARY: No incontinence. No nocturia. No frequency. No dysuria.    MEDICATIONS AND ALLERGIES:  Reviewed        COMPREHENSIVE GYN HISTORY:  PAP History: Denies abnormal Paps. Last pap smear in 2018 with normal result.   Infection History: Denies STDs. Denies PID.  Benign History: Denies uterine fibroids. Denies ovarian cysts. Denies endometriosis. Denies other conditions.  Cancer History: Denies cervical cancer. Denies uterine cancer or hyperplasia. Denies ovarian cancer. Denies vulvar cancer or  "pre-cancer. Denies vaginal cancer or pre-cancer. Denies breast cancer. Denies colon cancer.  Sexual Activity History: Reports currently being sexually active  Menstrual History: None.  Contraception: None    /70   Ht 5' 2.99" (1.6 m)   LMP 2019   BMI 20.70 kg/m²     PE:  AFFECT: Calm, alert and oriented X 3. Interactive during exam  GENERAL: Appears well-nourished, well-developed, in no acute distress.  HEAD: Normocephalic, atruamatic  TEETH: Good dentition.  THYROID: No thyromegally   BREASTS: No masses, skin changes, nipple discharge or adenopathy bilaterally.  SKIN: Normal for race, warm, & dry. No lesions or rashes.  LUNGS: Easy and unlabored, clear to auscultation bilaterally.  HEART: Regular rate and rhythm   ABDOMEN: Soft and nontender without masses or organomegally.  VULVA: No lesions, masses or tenderness.  VAGINA: Moist and well rugated without lesions or discharge.  CERVIX: Moist and pink without lesions, discharge or tenderness.      UTERUS SIZE: 8 week size, nontender and without masses.  ADNEXA: No masses or tenderness.  ESTIMATE OF PELVIC CAPACITY: Adequate  EXTREMITIES: No cyanosis, clubbing or edema. No calf tenderness.  LYMPH NODES: No axillary or inguinal adenopathy.    PROCEDURES:  UPT Positive  Genprobe        ASSESSMENT/PLAN:  Amenorrhea  Positive urine pregnancy test (YASMIN: 10/6/20, EGA: 9w1d based on LMP)    -  Routine prenatal care    Nausea and vomiting in pregnancy    -  Education regarding lifestyle and dietary modifications    -  Refill of Reglan. OTC Vitamin B6/Unisom.    - Instructed to notify if persistent n/v or intolerance of food/fluids for further evaluation by OBGYN, verbalized understanding.      1st TRIMESTER COUNSELING: Discussed all, booklet provided:  Common complaints of pregnancy  HIV and other routine prenatal tests including  genetic screening  Risk factors identified by prenatal history  Oriented to practice - discussed anticipated course of " prenatal care & indications for Ultrasound  Childbirth classes/Hospital facilities   Nutrition and weight gain counseling  Toxoplasmosis precautions (Cats/Raw Meat)  Sexual activity and exercise  Environmental/Work hazards  Travel  Tobacco (Ask, Advise, Assess, Assist, and Arrange), as well as alcohol and drug use  Use of any medications (Including supplements, Vitamins, Herbs, or OTC Drugs)  Domestic violence  Seat belt use      TERATOLOGY COUNSELING:   Discussed indications and options for aneuploidy screening - pamphlets given    -  Pt desires [id number] genetic screening, pamphlet with number given,  will explore insurance coverage and will notify if desire NT/sequential screening instead.  Dating US today  FOLLOW-UP in 4 weeks with Dr. Krissy Lemons, KEISHA    OB/GYN

## 2020-03-04 NOTE — TELEPHONE ENCOUNTER
Called patient to get her scheduled for her initial OB appointment with . Left voicemail for her to give the office a call back.

## 2020-03-05 LAB
C TRACH DNA SPEC QL NAA+PROBE: NOT DETECTED
HBV SURFACE AG SERPL QL IA: NEGATIVE
HIV 1+2 AB+HIV1 P24 AG SERPL QL IA: NEGATIVE
N GONORRHOEA DNA SPEC QL NAA+PROBE: NOT DETECTED
RPR SER QL: NORMAL
RUBV IGG SER-ACNC: 65.3 IU/ML
RUBV IGG SER-IMP: REACTIVE

## 2020-03-06 ENCOUNTER — PATIENT MESSAGE (OUTPATIENT)
Dept: OBSTETRICS AND GYNECOLOGY | Facility: CLINIC | Age: 30
End: 2020-03-06

## 2020-03-06 LAB — BACTERIA UR CULT: ABNORMAL

## 2020-03-09 ENCOUNTER — PATIENT MESSAGE (OUTPATIENT)
Dept: OBSTETRICS AND GYNECOLOGY | Facility: CLINIC | Age: 30
End: 2020-03-09

## 2020-03-09 RX ORDER — NITROFURANTOIN 25; 75 MG/1; MG/1
100 CAPSULE ORAL 2 TIMES DAILY
Qty: 14 CAPSULE | Refills: 0 | Status: SHIPPED | OUTPATIENT
Start: 2020-03-09 | End: 2020-03-16

## 2020-03-12 LAB
HGB A2 MFR BLD HPLC: 2.7 % (ref 2.2–3.2)
HGB FRACT BLD ELPH-IMP: NORMAL
HGB FRACT BLD ELPH-IMP: NORMAL

## 2020-03-16 DIAGNOSIS — O21.9 NAUSEA AND VOMITING DURING PREGNANCY: ICD-10-CM

## 2020-03-16 RX ORDER — METOCLOPRAMIDE 10 MG/1
10 TABLET ORAL EVERY 6 HOURS PRN
Qty: 20 TABLET | Refills: 0 | Status: SHIPPED | OUTPATIENT
Start: 2020-03-16 | End: 2020-06-18

## 2020-03-17 ENCOUNTER — HOSPITAL ENCOUNTER (EMERGENCY)
Facility: OTHER | Age: 30
Discharge: HOME | End: 2020-03-17
Attending: OBSTETRICS & GYNECOLOGY
Payer: COMMERCIAL

## 2020-03-17 ENCOUNTER — PATIENT MESSAGE (OUTPATIENT)
Dept: OBSTETRICS AND GYNECOLOGY | Facility: CLINIC | Age: 30
End: 2020-03-17

## 2020-03-17 VITALS
SYSTOLIC BLOOD PRESSURE: 109 MMHG | OXYGEN SATURATION: 99 % | TEMPERATURE: 98 F | RESPIRATION RATE: 16 BRPM | HEART RATE: 85 BPM | DIASTOLIC BLOOD PRESSURE: 64 MMHG

## 2020-03-17 DIAGNOSIS — Z67.91 RH NEGATIVE STATE IN ANTEPARTUM PERIOD, FIRST TRIMESTER: ICD-10-CM

## 2020-03-17 DIAGNOSIS — O26.891 RH NEGATIVE STATE IN ANTEPARTUM PERIOD, FIRST TRIMESTER: ICD-10-CM

## 2020-03-17 DIAGNOSIS — Z3A.11 11 WEEKS GESTATION OF PREGNANCY: ICD-10-CM

## 2020-03-17 DIAGNOSIS — O20.0 THREATENED MISCARRIAGE: Primary | ICD-10-CM

## 2020-03-17 PROBLEM — O35.09X0 PREGNANCY COMPLICATED BY FETAL CEREBRAL VENTRICULOMEGALY: Status: RESOLVED | Noted: 2018-11-14 | Resolved: 2020-03-17

## 2020-03-17 PROBLEM — O28.8 NON-REACTIVE NST (NON-STRESS TEST): Status: RESOLVED | Noted: 2018-11-27 | Resolved: 2020-03-17

## 2020-03-17 PROBLEM — Z34.00 PREGNANCY, SUPERVISION, NORMAL, FIRST: Status: RESOLVED | Noted: 2018-08-09 | Resolved: 2020-03-17

## 2020-03-17 PROBLEM — O35.BXX0 FETAL CARDIAC ANOMALY AFFECTING PREGNANCY, ANTEPARTUM: Status: RESOLVED | Noted: 2018-11-14 | Resolved: 2020-03-17

## 2020-03-17 LAB
ABO + RH BLD: NORMAL
BASOPHILS # BLD AUTO: 0.04 K/UL (ref 0–0.2)
BASOPHILS NFR BLD: 0.4 % (ref 0–1.9)
BLD GP AB SCN CELLS X3 SERPL QL: NORMAL
DIFFERENTIAL METHOD: ABNORMAL
EOSINOPHIL # BLD AUTO: 0.1 K/UL (ref 0–0.5)
EOSINOPHIL NFR BLD: 0.6 % (ref 0–8)
ERYTHROCYTE [DISTWIDTH] IN BLOOD BY AUTOMATED COUNT: 12 % (ref 11.5–14.5)
HCT VFR BLD AUTO: 37.4 % (ref 37–48.5)
HGB BLD-MCNC: 13.2 G/DL (ref 12–16)
IMM GRANULOCYTES # BLD AUTO: 0.05 K/UL (ref 0–0.04)
IMM GRANULOCYTES NFR BLD AUTO: 0.5 % (ref 0–0.5)
INJECT RH IG VOL PATIENT: NORMAL ML
LYMPHOCYTES # BLD AUTO: 2.1 K/UL (ref 1–4.8)
LYMPHOCYTES NFR BLD: 19.7 % (ref 18–48)
MCH RBC QN AUTO: 29.8 PG (ref 27–31)
MCHC RBC AUTO-ENTMCNC: 35.3 G/DL (ref 32–36)
MCV RBC AUTO: 84 FL (ref 82–98)
MONOCYTES # BLD AUTO: 0.6 K/UL (ref 0.3–1)
MONOCYTES NFR BLD: 5.2 % (ref 4–15)
NEUTROPHILS # BLD AUTO: 7.7 K/UL (ref 1.8–7.7)
NEUTROPHILS NFR BLD: 73.6 % (ref 38–73)
NRBC BLD-RTO: 0 /100 WBC
PLATELET # BLD AUTO: 281 K/UL (ref 150–350)
PMV BLD AUTO: 9.8 FL (ref 9.2–12.9)
RBC # BLD AUTO: 4.43 M/UL (ref 4–5.4)
WBC # BLD AUTO: 10.51 K/UL (ref 3.9–12.7)

## 2020-03-17 PROCEDURE — 85025 COMPLETE CBC W/AUTO DIFF WBC: CPT

## 2020-03-17 PROCEDURE — 99284 EMERGENCY DEPT VISIT MOD MDM: CPT | Mod: ,,, | Performed by: OBSTETRICS & GYNECOLOGY

## 2020-03-17 PROCEDURE — 99284 EMERGENCY DEPT VISIT MOD MDM: CPT | Mod: 25

## 2020-03-17 PROCEDURE — 99284 PR EMERGENCY DEPT VISIT,LEVEL IV: ICD-10-PCS | Mod: ,,, | Performed by: OBSTETRICS & GYNECOLOGY

## 2020-03-17 PROCEDURE — 86850 RBC ANTIBODY SCREEN: CPT

## 2020-03-17 PROCEDURE — 63600519 RHOGAM PHARM REV CODE 636 ALT 250 W HCPCS: Performed by: OBSTETRICS & GYNECOLOGY

## 2020-03-17 PROCEDURE — 96372 THER/PROPH/DIAG INJ SC/IM: CPT

## 2020-03-17 RX ADMIN — HUMAN RHO(D) IMMUNE GLOBULIN 300 MCG: 300 INJECTION, SOLUTION INTRAMUSCULAR at 05:03

## 2020-03-17 NOTE — ED PROVIDER NOTES
Encounter Date: 3/17/2020       History     Chief Complaint   Patient presents with    Vaginal Bleeding     HPI   Ximena Robbins is a 29 y.o. [id number] at 11w2d who presents complaining of vaginal bleeding.Pt reports moderate bleeding that began 2 hours ago, repots passage of small clots. Denies passage of tissue. Endorses associated abdominal cramping.  This IUP is uncomplicated.      Review of patient's allergies indicates:  No Known Allergies  Past Medical History:   Diagnosis Date    Anemia      Past Surgical History:   Procedure Laterality Date     SECTION N/A 2018    Procedure:  SECTION;  Surgeon: Ina Harper DO;  Location: [facility] L&D;  Service: OB/GYN;  Laterality: N/A;     Family History   Problem Relation Age of Onset    Cardiomyopathy Neg Hx     Arrhythmia Neg Hx     Congenital heart disease Neg Hx     Early death Neg Hx     Heart attacks under age 50 Neg Hx     Pacemaker/defibrilator Neg Hx      Social History     Tobacco Use    Smoking status: Never Smoker    Smokeless tobacco: Never Used   Substance Use Topics    Alcohol use: No    Drug use: No     Review of Systems   Constitutional: Negative for chills and fever.   HENT: Negative for postnasal drip, rhinorrhea, sinus pressure and sore throat.    Eyes: Negative for photophobia and visual disturbance.   Respiratory: Negative for cough and shortness of breath.    Cardiovascular: Negative for chest pain and palpitations.   Gastrointestinal: Negative for nausea and vomiting.   Genitourinary: Negative for dysuria, frequency and urgency.   Musculoskeletal: Negative for back pain.   Skin: Negative for pallor and rash.   Neurological: Negative for dizziness, light-headedness and headaches.   Psychiatric/Behavioral: The patient is not nervous/anxious.        Physical Exam     Initial Vitals [20 0431]   BP Pulse Resp Temp SpO2   104/64 85 16 97.9 °F (36.6 °C) 99 %      MAP       --         Physical  Exam    Constitutional: She appears well-developed and well-nourished.   HENT:   Head: Normocephalic and atraumatic.   Cardiovascular: Normal rate, regular rhythm, normal heart sounds and intact distal pulses.   Pulmonary/Chest: Breath sounds normal.   Abdominal: Soft.   Genitourinary:   Genitourinary Comments: Moderate amount of vaginal bleeding cleared with 5 protocoswabs, 1 small clot easily cleared; no active bleeding, cervix closed    Neurological: She is alert and oriented to person, place, and time.   Skin: Skin is warm and dry.   Psychiatric: She has a normal mood and affect. Her behavior is normal. Judgment and thought content normal.         ED Course   Procedures  Labs Reviewed   CBC W/ AUTO DIFFERENTIAL - Abnormal; Notable for the following components:       Result Value    Immature Grans (Abs) 0.05 (*)     Gran% 73.6 (*)     All other components within normal limits   POCT URINALYSIS, DIPSTICK OR TABLET REAGENT, AUTOMATED, WITH MICROSCOP   TYPE & SCREEN   PREPARE RH IMMUNE GLOBULIN          Imaging Results    None          Medical Decision Making:   ED Management:  VSS  Exam with small amount of old blood in vault with small clot; cervix visually and digitally closed  Bedside US with +FHT 173bpm, CRL 11w3d; grossly normal fluid, small subchorionic hematoma 2x1cm  Pt counseled on US findings, given pain and bleeding precautions  Discharged in stable condition                   ED Course as of Mar 17 0600   Tue Mar 17, 2020   0521 Limited ultrasound: SLIUP, 11 3/7 wga by CRL; + ; grossly normal fluid; small subchorionic hemorrhage noted 2 x 1cm    [AV]      ED Course User Index  [AV] Pebbles Hooks MD                Clinical Impression:       ICD-10-CM ICD-9-CM   1. Threatened miscarriage O20.0 640.00   2. 11 weeks gestation of pregnancy Z3A.11 V22.2   3. Rh negative state in antepartum period, first trimester O26.891 646.83    Z67.91                                 Leesa Diaz,  MD  Resident  03/17/20 0608

## 2020-03-17 NOTE — ED NOTES
"Pt presents to the OB ED with c/o of vaginal bleeding that began about an hour ago. Pt states that the blood "oozed out of her pants". Pt also states that she has experienced abdominal cramping. Pt has no other complaints. Pt roomed. MD notified.  "

## 2020-04-01 ENCOUNTER — INITIAL PRENATAL (OUTPATIENT)
Dept: OBSTETRICS AND GYNECOLOGY | Facility: CLINIC | Age: 30
End: 2020-04-01
Payer: COMMERCIAL

## 2020-04-01 VITALS
DIASTOLIC BLOOD PRESSURE: 54 MMHG | SYSTOLIC BLOOD PRESSURE: 118 MMHG | BODY MASS INDEX: 19.34 KG/M2 | WEIGHT: 109.13 LBS

## 2020-04-01 DIAGNOSIS — Z67.91 RH NEGATIVE STATE IN ANTEPARTUM PERIOD: ICD-10-CM

## 2020-04-01 DIAGNOSIS — Z34.92 NORMAL PREGNANCY IN SECOND TRIMESTER: ICD-10-CM

## 2020-04-01 DIAGNOSIS — J45.20 MILD INTERMITTENT ASTHMA WITHOUT COMPLICATION: ICD-10-CM

## 2020-04-01 DIAGNOSIS — Z98.891 HISTORY OF CESAREAN DELIVERY: ICD-10-CM

## 2020-04-01 DIAGNOSIS — Z60.3 LANGUAGE BARRIER AFFECTING HEALTH CARE: ICD-10-CM

## 2020-04-01 DIAGNOSIS — Z75.8 LANGUAGE BARRIER AFFECTING HEALTH CARE: ICD-10-CM

## 2020-04-01 DIAGNOSIS — Z84.3 CONSANGUINITY: ICD-10-CM

## 2020-04-01 DIAGNOSIS — O09.299 HISTORY OF FETAL ABNORMALITY IN PREVIOUS PREGNANCY, CURRENTLY PREGNANT: ICD-10-CM

## 2020-04-01 DIAGNOSIS — O26.899 RH NEGATIVE STATE IN ANTEPARTUM PERIOD: ICD-10-CM

## 2020-04-01 PROCEDURE — 99999 PR PBB SHADOW E&M-EST. PATIENT-LVL III: CPT | Mod: PBBFAC,,, | Performed by: OBSTETRICS & GYNECOLOGY

## 2020-04-01 PROCEDURE — 0500F PR INITIAL PRENATAL CARE VISIT: ICD-10-PCS | Mod: S$GLB,,, | Performed by: OBSTETRICS & GYNECOLOGY

## 2020-04-01 PROCEDURE — 99999 PR PBB SHADOW E&M-EST. PATIENT-LVL III: ICD-10-PCS | Mod: PBBFAC,,, | Performed by: OBSTETRICS & GYNECOLOGY

## 2020-04-01 PROCEDURE — 0500F INITIAL PRENATAL CARE VISIT: CPT | Mod: S$GLB,,, | Performed by: OBSTETRICS & GYNECOLOGY

## 2020-04-01 SDOH — SOCIAL DETERMINANTS OF HEALTH (SDOH): ACCULTURATION DIFFICULTY: Z60.3

## 2020-04-01 NOTE — PROGRESS NOTES
Patient reports bleeding for the past two weeks.  Reports some heavy bleeding, other times spotting.  Also has pain and cramping sometimes.  FHT confirmed today.  Recent ultrasound in ED showed small subchorionic hemorrhage.  Discussed that bleeding could be from this, but also could be a pending miscarriage.    She is still having nausea and vomiting.  Medicines are helping.   Reviewed previous pregnancy with multiple fetal anomalies.  Discussed referral to [facility]- order placed.  Discussed materniti 21 test.  Patient to call insurance to determine if covered.  Has the lab order in the event that she decides to have it drawn.    RTC in 4 weeks.  Will not do virtual visits, language barrier likely to be a problem for this.

## 2020-04-14 ENCOUNTER — TELEPHONE (OUTPATIENT)
Dept: MATERNAL FETAL MEDICINE | Facility: CLINIC | Age: 30
End: 2020-04-14

## 2020-05-11 ENCOUNTER — PATIENT MESSAGE (OUTPATIENT)
Dept: MATERNAL FETAL MEDICINE | Facility: CLINIC | Age: 30
End: 2020-05-11

## 2020-05-15 ENCOUNTER — PROCEDURE VISIT (OUTPATIENT)
Dept: MATERNAL FETAL MEDICINE | Facility: CLINIC | Age: 30
End: 2020-05-15
Payer: COMMERCIAL

## 2020-05-15 ENCOUNTER — INITIAL CONSULT (OUTPATIENT)
Dept: MATERNAL FETAL MEDICINE | Facility: CLINIC | Age: 30
End: 2020-05-15
Attending: OBSTETRICS & GYNECOLOGY
Payer: COMMERCIAL

## 2020-05-15 VITALS
DIASTOLIC BLOOD PRESSURE: 60 MMHG | SYSTOLIC BLOOD PRESSURE: 100 MMHG | HEIGHT: 63 IN | BODY MASS INDEX: 19.88 KG/M2 | WEIGHT: 112.19 LBS

## 2020-05-15 DIAGNOSIS — Z36.3 ANTENATAL SCREENING FOR MALFORMATION USING ULTRASONICS: ICD-10-CM

## 2020-05-15 DIAGNOSIS — Z34.92 NORMAL PREGNANCY IN SECOND TRIMESTER: ICD-10-CM

## 2020-05-15 DIAGNOSIS — Z84.3 CONSANGUINITY: ICD-10-CM

## 2020-05-15 DIAGNOSIS — O99.891 CURRENT MATERNAL CONDITION AFFECTING PREGNANCY: ICD-10-CM

## 2020-05-15 DIAGNOSIS — Z60.3 LANGUAGE BARRIER AFFECTING HEALTH CARE: ICD-10-CM

## 2020-05-15 DIAGNOSIS — O09.299 HISTORY OF FETAL ABNORMALITY IN PREVIOUS PREGNANCY, CURRENTLY PREGNANT: ICD-10-CM

## 2020-05-15 DIAGNOSIS — Z3A.19 19 WEEKS GESTATION OF PREGNANCY: ICD-10-CM

## 2020-05-15 DIAGNOSIS — Z75.8 LANGUAGE BARRIER AFFECTING HEALTH CARE: ICD-10-CM

## 2020-05-15 DIAGNOSIS — Z36.89 ENCOUNTER FOR ULTRASOUND TO ASSESS FETAL GROWTH: Primary | ICD-10-CM

## 2020-05-15 DIAGNOSIS — J45.20 MILD INTERMITTENT ASTHMA WITHOUT COMPLICATION: ICD-10-CM

## 2020-05-15 PROCEDURE — 76811 OB US DETAILED SNGL FETUS: CPT | Mod: S$GLB,,, | Performed by: OBSTETRICS & GYNECOLOGY

## 2020-05-15 PROCEDURE — 99999 PR PBB SHADOW E&M-EST. PATIENT-LVL III: ICD-10-PCS | Mod: PBBFAC,,, | Performed by: OBSTETRICS & GYNECOLOGY

## 2020-05-15 PROCEDURE — 99212 OFFICE O/P EST SF 10 MIN: CPT | Mod: 25,S$GLB,, | Performed by: OBSTETRICS & GYNECOLOGY

## 2020-05-15 PROCEDURE — 99999 PR PBB SHADOW E&M-EST. PATIENT-LVL III: CPT | Mod: PBBFAC,,, | Performed by: OBSTETRICS & GYNECOLOGY

## 2020-05-15 PROCEDURE — 99212 PR OFFICE/OUTPT VISIT, EST, LEVL II, 10-19 MIN: ICD-10-PCS | Mod: 25,S$GLB,, | Performed by: OBSTETRICS & GYNECOLOGY

## 2020-05-15 PROCEDURE — 3008F BODY MASS INDEX DOCD: CPT | Mod: CPTII,S$GLB,, | Performed by: OBSTETRICS & GYNECOLOGY

## 2020-05-15 PROCEDURE — 3008F PR BODY MASS INDEX (BMI) DOCUMENTED: ICD-10-PCS | Mod: CPTII,S$GLB,, | Performed by: OBSTETRICS & GYNECOLOGY

## 2020-05-15 PROCEDURE — 76811 PR US, OB FETAL EVAL & EXAM, TRANSABDOM,FIRST GESTATION: ICD-10-PCS | Mod: S$GLB,,, | Performed by: OBSTETRICS & GYNECOLOGY

## 2020-05-15 SDOH — SOCIAL DETERMINANTS OF HEALTH (SDOH): ACCULTURATION DIFFICULTY: Z60.3

## 2020-05-15 NOTE — LETTER
May 15, 2020      Diana Rey MD  [address] [facility] [address]           [facility] 4th Fl  [address]  [facility] [address]  Phone: [phone]          Patient: Ximena Robbins   MR Number: [id number]   YOB: 1990   Date of Visit: 5/15/2020       Dear Dr. Diana Rey:    Thank you for referring Ximena Robbins to me for evaluation. Attached you will find relevant portions of my assessment and plan of care.    If you have questions, please do not hesitate to call me. I look forward to following Ximena Robbins along with you.    Sincerely,    Diana Wallis MD    Enclosure  CC:  No Recipients    If you would like to receive this communication electronically, please contact [email or web address] or [phone] to request more information on [employer] access.    For providers and/or their staff who would like to refer a patient to [facility], please contact us through our one-stop-shop provider referral line, [facility], at [phone].    If you feel you have received this communication in error or would no longer like to receive these types of communications, please e-mail [email or web address]

## 2020-05-20 ENCOUNTER — TELEPHONE (OUTPATIENT)
Dept: OBSTETRICS AND GYNECOLOGY | Facility: CLINIC | Age: 30
End: 2020-05-20

## 2020-05-21 ENCOUNTER — ROUTINE PRENATAL (OUTPATIENT)
Dept: OBSTETRICS AND GYNECOLOGY | Facility: CLINIC | Age: 30
End: 2020-05-21
Payer: COMMERCIAL

## 2020-05-21 VITALS — WEIGHT: 109.81 LBS | BODY MASS INDEX: 19.45 KG/M2 | DIASTOLIC BLOOD PRESSURE: 48 MMHG | SYSTOLIC BLOOD PRESSURE: 74 MMHG

## 2020-05-21 DIAGNOSIS — Z34.92 NORMAL PREGNANCY IN SECOND TRIMESTER: Primary | ICD-10-CM

## 2020-05-21 DIAGNOSIS — J45.20 MILD INTERMITTENT ASTHMA WITHOUT COMPLICATION: ICD-10-CM

## 2020-05-21 DIAGNOSIS — Z98.891 HISTORY OF CESAREAN DELIVERY: ICD-10-CM

## 2020-05-21 PROCEDURE — 0502F PR SUBSEQUENT PRENATAL CARE: ICD-10-PCS | Mod: CPTII,S$GLB,, | Performed by: OBSTETRICS & GYNECOLOGY

## 2020-05-21 PROCEDURE — 0502F SUBSEQUENT PRENATAL CARE: CPT | Mod: CPTII,S$GLB,, | Performed by: OBSTETRICS & GYNECOLOGY

## 2020-05-21 PROCEDURE — 99999 PR PBB SHADOW E&M-EST. PATIENT-LVL III: ICD-10-PCS | Mod: PBBFAC,,, | Performed by: OBSTETRICS & GYNECOLOGY

## 2020-05-21 PROCEDURE — 99999 PR PBB SHADOW E&M-EST. PATIENT-LVL III: CPT | Mod: PBBFAC,,, | Performed by: OBSTETRICS & GYNECOLOGY

## 2020-05-21 NOTE — PROGRESS NOTES
Good fetal movement.  No contractions, no vaginal bleeding, and no loss of fluid.  Plan for ob glucose and cbc next visit.  Has fetal echo scheduled.

## 2020-06-05 ENCOUNTER — OFFICE VISIT (OUTPATIENT)
Dept: PEDIATRIC CARDIOLOGY | Facility: CLINIC | Age: 30
End: 2020-06-05
Attending: PEDIATRICS
Payer: COMMERCIAL

## 2020-06-05 ENCOUNTER — CLINICAL SUPPORT (OUTPATIENT)
Dept: PEDIATRIC CARDIOLOGY | Facility: CLINIC | Age: 30
End: 2020-06-05
Attending: OBSTETRICS & GYNECOLOGY
Payer: COMMERCIAL

## 2020-06-05 VITALS
HEART RATE: 70 BPM | WEIGHT: 117.06 LBS | DIASTOLIC BLOOD PRESSURE: 60 MMHG | BODY MASS INDEX: 20.74 KG/M2 | SYSTOLIC BLOOD PRESSURE: 89 MMHG

## 2020-06-05 DIAGNOSIS — Z82.79 FAMILY HISTORY OF CONGENITAL HEART DISEASE: ICD-10-CM

## 2020-06-05 DIAGNOSIS — O09.299 HISTORY OF FETAL ABNORMALITY IN PREVIOUS PREGNANCY, CURRENTLY PREGNANT: ICD-10-CM

## 2020-06-05 DIAGNOSIS — O09.299 HISTORY OF FETAL ABNORMALITY IN PREVIOUS PREGNANCY, CURRENTLY PREGNANT: Primary | ICD-10-CM

## 2020-06-05 DIAGNOSIS — Z84.3 CONSANGUINITY: ICD-10-CM

## 2020-06-05 PROCEDURE — 76825 PR  SO2 FETAL HEART: ICD-10-PCS | Mod: S$GLB,,, | Performed by: PEDIATRICS

## 2020-06-05 PROCEDURE — 99204 OFFICE O/P NEW MOD 45 MIN: CPT | Mod: 25,S$GLB,, | Performed by: PEDIATRICS

## 2020-06-05 PROCEDURE — 76827 ECHO EXAM OF FETAL HEART: CPT | Mod: S$GLB,,, | Performed by: PEDIATRICS

## 2020-06-05 PROCEDURE — 99204 PR OFFICE/OUTPT VISIT, NEW, LEVL IV, 45-59 MIN: ICD-10-PCS | Mod: 25,S$GLB,, | Performed by: PEDIATRICS

## 2020-06-05 PROCEDURE — 99999 PR PBB SHADOW E&M-EST. PATIENT-LVL I: CPT | Mod: PBBFAC,,,

## 2020-06-05 PROCEDURE — 76825 ECHO EXAM OF FETAL HEART: CPT | Mod: S$GLB,,, | Performed by: PEDIATRICS

## 2020-06-05 PROCEDURE — 99999 PR PBB SHADOW E&M-EST. PATIENT-LVL III: CPT | Mod: PBBFAC,,, | Performed by: PEDIATRICS

## 2020-06-05 PROCEDURE — 99999 PR PBB SHADOW E&M-EST. PATIENT-LVL III: ICD-10-PCS | Mod: PBBFAC,,, | Performed by: PEDIATRICS

## 2020-06-05 PROCEDURE — 93325 PR DOPPLER COLOR FLOW VELOCITY MAP: ICD-10-PCS | Mod: S$GLB,,, | Performed by: PEDIATRICS

## 2020-06-05 PROCEDURE — 99999 PR PBB SHADOW E&M-EST. PATIENT-LVL I: ICD-10-PCS | Mod: PBBFAC,,,

## 2020-06-05 PROCEDURE — 76827 PR  SO2 FETAL HEART DOPPLER: ICD-10-PCS | Mod: S$GLB,,, | Performed by: PEDIATRICS

## 2020-06-05 PROCEDURE — 93325 DOPPLER ECHO COLOR FLOW MAPG: CPT | Mod: S$GLB,,, | Performed by: PEDIATRICS

## 2020-06-05 NOTE — LETTER
June 5, 2020        Diana Rey MD  [address] [facility] [address]             [facility]  [address] 4TH FLOOR  [facility] [address]  Phone: [phone]  Fax: [phone]   Patient: Ximena Robbins   MR Number: [id number]   YOB: 1990   Date of Visit: 6/5/2020       Dear Dr. Rey:    Thank you for referring Ximena Robbins to me for evaluation. Below are the relevant portions of my assessment and plan of care.            If you have questions, please do not hesitate to call me. I look forward to following Ximena along with you.    Sincerely,      Grazyna Trimble MD           CC  Diana Wallis MD

## 2020-06-05 NOTE — PROGRESS NOTES
[facility] [address] Fetal Cardiology Clinic    Today, I had the pleasure of evaluating Ximena Robbins who is now 30 y.o. and carrying her second pregnancy at 22 4/7 weeks gestation with an YASMIN of 18. She was referred for evaluation of the fetal heart due to prior child with congenital heart disease.  Her 1st baby had double the right ventricle with no outflow tract obstruction.  He also had tracheoesophageal fistula.  He was born premature and had a protracted NICU course complicated primarily by surgical on medical GI issues.  He ultimately  a few months of age in the NICU.      She is carrying a male fetus, yet unnamed.  Pregnancy thus far has been otherwise uncomplicated.    Obstetric History:    .  Her OB history is notable for prior  delivery.  As well as consanguinity.    Past Medical History:   Diagnosis Date    Anemia          Current Outpatient Medications:     albuterol (VENTOLIN HFA) 90 mcg/actuation inhaler, Inhale 2 puffs into the lungs every 6 (six) hours as needed for Wheezing (and cough). Rescue, Disp: 18 g, Rfl: 0    prenatal vit calc,iron,folic (PRENATAL VITAMIN ORAL), Take by mouth., Disp: , Rfl:     doxylamine-pyridoxine, vit B6, (DICLEGIS) 10-10 mg TbEC, Take 2 tablets by mouth every evening. (Patient not taking: Reported on 5/15/2020), Disp: 60 tablet, Rfl: 0    meclizine HCl (MECLIZINE ORAL), Take by mouth., Disp: , Rfl:     metoclopramide HCl (REGLAN) 10 MG tablet, Take 1 tablet (10 mg total) by mouth every 6 (six) hours as needed (nausea). (Patient not taking: Reported on 5/15/2020), Disp: 20 tablet, Rfl: 0     Review of patient's allergies indicates:  No Known Allergies    Family History:  Prior child with multiple anomalies including tracheoesophageal fistula and double outlet right ventricle, infant demise.      Social History: Ms. Ximena Robbins is  to the father of the baby. The father of the baby is involved.     FETAL  ECHOCARDIOGRAM (summary):  Fetal echocardiogram at 22 weeks and 4 days  Normal fetal echocardiogram  (Full report in electronic medical record)    Impression:  Single active male fetus at 22 4/7 wga.  Family history of multiple congenital anomalies and congenital heart disease and prior child.  Today's fetal echocardiogram is normal.     In fetuses with a parent or sibling with congential heart disease, the likelihood of congential heart disease in the infant is slightly greater than that of the general population (2-3/100). Fetal echocardiography is indicated to evaluate for congenital heart disease.     I discussed with her that fetal echocardiography is insufficiently sensitive to rule out all septal defects, anomalies of pulmonary and systemic veins, arch anomalies, and some valvar abnormalities, nor can it ensure that the ductus arteriosus and foramen ovale will spontaneously close.     Recommendations:  Location, timing, and mode of delivery will be determined by the obstetrical team.  She does not require further follow-up in the fetal echocardiography clinic, but I would be happy to see her again if additional questions or concerns arise.    Should there be any concerns about the baby's heart after birth, a post-saeid echocardiogram and cardiology consultation are recommended.     Grazyna Trimble MD, MSCI  Pediatric Cardiology  Pediatric Echocardiography, Fetal Echocardiography, Cardiac MRI  [facility] [address] [place]  [address]  Phone [phone], Fax [phone]    The above information was discussed in detail including the use of diagrams, with 30 minutes of total face to face time, with greater than 50% with counseling and coordination of care.  The discussion of the diagnosis and treatment options is as described above.

## 2020-06-18 ENCOUNTER — LAB VISIT (OUTPATIENT)
Dept: LAB | Facility: OTHER | Age: 30
End: 2020-06-18
Attending: OBSTETRICS & GYNECOLOGY
Payer: COMMERCIAL

## 2020-06-18 ENCOUNTER — ROUTINE PRENATAL (OUTPATIENT)
Dept: OBSTETRICS AND GYNECOLOGY | Facility: CLINIC | Age: 30
End: 2020-06-18
Payer: COMMERCIAL

## 2020-06-18 VITALS — DIASTOLIC BLOOD PRESSURE: 50 MMHG | BODY MASS INDEX: 20.78 KG/M2 | WEIGHT: 117.31 LBS | SYSTOLIC BLOOD PRESSURE: 90 MMHG

## 2020-06-18 DIAGNOSIS — Z34.92 NORMAL PREGNANCY IN SECOND TRIMESTER: ICD-10-CM

## 2020-06-18 DIAGNOSIS — Z34.92 NORMAL PREGNANCY IN SECOND TRIMESTER: Primary | ICD-10-CM

## 2020-06-18 LAB
BASOPHILS # BLD AUTO: 0.05 K/UL (ref 0–0.2)
BASOPHILS NFR BLD: 0.5 % (ref 0–1.9)
DIFFERENTIAL METHOD: ABNORMAL
EOSINOPHIL # BLD AUTO: 0.1 K/UL (ref 0–0.5)
EOSINOPHIL NFR BLD: 1 % (ref 0–8)
ERYTHROCYTE [DISTWIDTH] IN BLOOD BY AUTOMATED COUNT: 12.6 % (ref 11.5–14.5)
GLUCOSE SERPL-MCNC: 111 MG/DL (ref 70–140)
HCT VFR BLD AUTO: 34.2 % (ref 37–48.5)
HGB BLD-MCNC: 11.1 G/DL (ref 12–16)
IMM GRANULOCYTES # BLD AUTO: 0.18 K/UL (ref 0–0.04)
IMM GRANULOCYTES NFR BLD AUTO: 1.7 % (ref 0–0.5)
LYMPHOCYTES # BLD AUTO: 2.3 K/UL (ref 1–4.8)
LYMPHOCYTES NFR BLD: 21.2 % (ref 18–48)
MCH RBC QN AUTO: 30.4 PG (ref 27–31)
MCHC RBC AUTO-ENTMCNC: 32.5 G/DL (ref 32–36)
MCV RBC AUTO: 94 FL (ref 82–98)
MONOCYTES # BLD AUTO: 0.6 K/UL (ref 0.3–1)
MONOCYTES NFR BLD: 5.7 % (ref 4–15)
NEUTROPHILS # BLD AUTO: 7.6 K/UL (ref 1.8–7.7)
NEUTROPHILS NFR BLD: 69.9 % (ref 38–73)
NRBC BLD-RTO: 0 /100 WBC
PLATELET # BLD AUTO: 235 K/UL (ref 150–350)
PMV BLD AUTO: 9.7 FL (ref 9.2–12.9)
RBC # BLD AUTO: 3.65 M/UL (ref 4–5.4)
WBC # BLD AUTO: 10.86 K/UL (ref 3.9–12.7)

## 2020-06-18 PROCEDURE — 0502F SUBSEQUENT PRENATAL CARE: CPT | Mod: CPTII,S$GLB,, | Performed by: OBSTETRICS & GYNECOLOGY

## 2020-06-18 PROCEDURE — 36415 COLL VENOUS BLD VENIPUNCTURE: CPT

## 2020-06-18 PROCEDURE — 99999 PR PBB SHADOW E&M-EST. PATIENT-LVL III: ICD-10-PCS | Mod: PBBFAC,,, | Performed by: OBSTETRICS & GYNECOLOGY

## 2020-06-18 PROCEDURE — 0502F PR SUBSEQUENT PRENATAL CARE: ICD-10-PCS | Mod: CPTII,S$GLB,, | Performed by: OBSTETRICS & GYNECOLOGY

## 2020-06-18 PROCEDURE — 99999 PR PBB SHADOW E&M-EST. PATIENT-LVL III: CPT | Mod: PBBFAC,,, | Performed by: OBSTETRICS & GYNECOLOGY

## 2020-06-18 PROCEDURE — 82950 GLUCOSE TEST: CPT

## 2020-06-18 PROCEDURE — 85025 COMPLETE CBC W/AUTO DIFF WBC: CPT

## 2020-06-18 NOTE — PROGRESS NOTES
Good fetal movement.  No contractions, no vaginal bleeding, and no loss of fluid.  Discussed ob classes.  Labs today.

## 2020-06-19 ENCOUNTER — TELEPHONE (OUTPATIENT)
Dept: OBSTETRICS AND GYNECOLOGY | Facility: CLINIC | Age: 30
End: 2020-06-19

## 2020-06-19 NOTE — TELEPHONE ENCOUNTER
Called patient to get her scheduled for her follow up appointment with . Left voicemail for her to give the office a call back to schedule.

## 2020-06-22 ENCOUNTER — TELEPHONE (OUTPATIENT)
Dept: OBSTETRICS AND GYNECOLOGY | Facility: CLINIC | Age: 30
End: 2020-06-22

## 2020-06-22 NOTE — TELEPHONE ENCOUNTER
----- Message from Lainey Gallegos MA sent at 6/19/2020  8:43 AM CDT -----  This patient left without a follow up appointment.  Can you schedule it in one month?

## 2020-07-15 ENCOUNTER — ROUTINE PRENATAL (OUTPATIENT)
Dept: OBSTETRICS AND GYNECOLOGY | Facility: CLINIC | Age: 30
End: 2020-07-15
Payer: COMMERCIAL

## 2020-07-15 ENCOUNTER — HOSPITAL ENCOUNTER (OUTPATIENT)
Dept: RADIOLOGY | Facility: OTHER | Age: 30
Discharge: HOME | End: 2020-07-15
Attending: OBSTETRICS & GYNECOLOGY
Payer: COMMERCIAL

## 2020-07-15 ENCOUNTER — PROCEDURE VISIT (OUTPATIENT)
Dept: MATERNAL FETAL MEDICINE | Facility: CLINIC | Age: 30
End: 2020-07-15
Payer: COMMERCIAL

## 2020-07-15 VITALS — SYSTOLIC BLOOD PRESSURE: 90 MMHG | WEIGHT: 120.38 LBS | BODY MASS INDEX: 21.33 KG/M2 | DIASTOLIC BLOOD PRESSURE: 60 MMHG

## 2020-07-15 DIAGNOSIS — O09.299 HISTORY OF FETAL ABNORMALITY IN PREVIOUS PREGNANCY, CURRENTLY PREGNANT: ICD-10-CM

## 2020-07-15 DIAGNOSIS — R10.11 RUQ PAIN: ICD-10-CM

## 2020-07-15 DIAGNOSIS — Z34.92 NORMAL PREGNANCY IN SECOND TRIMESTER: Primary | ICD-10-CM

## 2020-07-15 DIAGNOSIS — O26.899 RH NEGATIVE STATE IN ANTEPARTUM PERIOD: ICD-10-CM

## 2020-07-15 DIAGNOSIS — Z67.91 RH NEGATIVE STATE IN ANTEPARTUM PERIOD: ICD-10-CM

## 2020-07-15 DIAGNOSIS — O40.3XX0 POLYHYDRAMNIOS IN THIRD TRIMESTER COMPLICATION, SINGLE OR UNSPECIFIED FETUS: ICD-10-CM

## 2020-07-15 DIAGNOSIS — Z36.89 ENCOUNTER FOR ULTRASOUND TO ASSESS FETAL GROWTH: ICD-10-CM

## 2020-07-15 PROCEDURE — 76816 OB US FOLLOW-UP PER FETUS: CPT | Mod: S$GLB,,, | Performed by: OBSTETRICS & GYNECOLOGY

## 2020-07-15 PROCEDURE — 90471 IMMUNIZATION ADMIN: CPT | Mod: S$GLB,,, | Performed by: OBSTETRICS & GYNECOLOGY

## 2020-07-15 PROCEDURE — 76700 US EXAM ABDOM COMPLETE: CPT | Mod: 26,,, | Performed by: RADIOLOGY

## 2020-07-15 PROCEDURE — 0502F SUBSEQUENT PRENATAL CARE: CPT | Mod: CPTII,S$GLB,, | Performed by: OBSTETRICS & GYNECOLOGY

## 2020-07-15 PROCEDURE — 99999 PR PBB SHADOW E&M-EST. PATIENT-LVL III: CPT | Mod: PBBFAC,,, | Performed by: OBSTETRICS & GYNECOLOGY

## 2020-07-15 PROCEDURE — 96372 RHO (D) IMMUNE GLOBULIN: ICD-10-PCS | Mod: 59,S$GLB,, | Performed by: OBSTETRICS & GYNECOLOGY

## 2020-07-15 PROCEDURE — 76816 PR  US,PREGNANT UTERUS,F/U,TRANSABD APP: ICD-10-PCS | Mod: S$GLB,,, | Performed by: OBSTETRICS & GYNECOLOGY

## 2020-07-15 PROCEDURE — 0502F PR SUBSEQUENT PRENATAL CARE: ICD-10-PCS | Mod: CPTII,S$GLB,, | Performed by: OBSTETRICS & GYNECOLOGY

## 2020-07-15 PROCEDURE — 76700 US EXAM ABDOM COMPLETE: CPT | Mod: TC

## 2020-07-15 PROCEDURE — 76700 US ABDOMEN COMPLETE: ICD-10-PCS | Mod: 26,,, | Performed by: RADIOLOGY

## 2020-07-15 PROCEDURE — 99999 PR PBB SHADOW E&M-EST. PATIENT-LVL III: ICD-10-PCS | Mod: PBBFAC,,, | Performed by: OBSTETRICS & GYNECOLOGY

## 2020-07-15 PROCEDURE — 90715 TDAP VACCINE GREATER THAN OR EQUAL TO 7YO IM: ICD-10-PCS | Mod: S$GLB,,, | Performed by: OBSTETRICS & GYNECOLOGY

## 2020-07-15 PROCEDURE — 96372 THER/PROPH/DIAG INJ SC/IM: CPT | Mod: 59,S$GLB,, | Performed by: OBSTETRICS & GYNECOLOGY

## 2020-07-15 PROCEDURE — 90715 TDAP VACCINE 7 YRS/> IM: CPT | Mod: S$GLB,,, | Performed by: OBSTETRICS & GYNECOLOGY

## 2020-07-15 PROCEDURE — 90471 TDAP VACCINE GREATER THAN OR EQUAL TO 7YO IM: ICD-10-PCS | Mod: S$GLB,,, | Performed by: OBSTETRICS & GYNECOLOGY

## 2020-07-15 NOTE — PROGRESS NOTES
Good fetal movement.  No contractions, no vaginal bleeding, and no loss of fluid.  Does complain of constant RUQ pain.  Does not get worse with eating.  No nausea or vomiting.  Just notices that the pain waxes and wanes.  Will get RUQ ultrasound today.   Rhogam and tdap today.  RTC 2 weeks.

## 2020-07-15 NOTE — NURSING
Here for TDAP injection. Patient without complaint of pain at this time, Injection given. Tolerated well. No pain noted after injection.  Advised to wait in lobby 15 minutes and report any adverse reactions.     Site:[facility]    Baby Friendly Handout Given to Patient      Here for rhogam injection, no complaints at this time. Verified patient is RH negative. No complaint of pain before or after injection. Patient advised to wait 15 minutes before leaving and report any adverse reactions.    Site:[facility]

## 2020-08-13 ENCOUNTER — ROUTINE PRENATAL (OUTPATIENT)
Dept: OBSTETRICS AND GYNECOLOGY | Facility: CLINIC | Age: 30
End: 2020-08-13
Payer: COMMERCIAL

## 2020-08-13 ENCOUNTER — PROCEDURE VISIT (OUTPATIENT)
Dept: MATERNAL FETAL MEDICINE | Facility: CLINIC | Age: 30
End: 2020-08-13
Payer: COMMERCIAL

## 2020-08-13 VITALS — BODY MASS INDEX: 21.84 KG/M2 | WEIGHT: 123.25 LBS | DIASTOLIC BLOOD PRESSURE: 64 MMHG | SYSTOLIC BLOOD PRESSURE: 92 MMHG

## 2020-08-13 DIAGNOSIS — Z34.92 NORMAL PREGNANCY IN SECOND TRIMESTER: Primary | ICD-10-CM

## 2020-08-13 DIAGNOSIS — Z67.91 RH NEGATIVE STATE IN ANTEPARTUM PERIOD: ICD-10-CM

## 2020-08-13 DIAGNOSIS — Z36.89 ENCOUNTER FOR ULTRASOUND TO ASSESS FETAL GROWTH: ICD-10-CM

## 2020-08-13 DIAGNOSIS — O09.299 HISTORY OF FETAL ABNORMALITY IN PREVIOUS PREGNANCY, CURRENTLY PREGNANT: ICD-10-CM

## 2020-08-13 DIAGNOSIS — O26.899 RH NEGATIVE STATE IN ANTEPARTUM PERIOD: ICD-10-CM

## 2020-08-13 PROCEDURE — 0502F PR SUBSEQUENT PRENATAL CARE: ICD-10-PCS | Mod: CPTII,S$GLB,, | Performed by: OBSTETRICS & GYNECOLOGY

## 2020-08-13 PROCEDURE — 99999 PR PBB SHADOW E&M-EST. PATIENT-LVL III: ICD-10-PCS | Mod: PBBFAC,,, | Performed by: OBSTETRICS & GYNECOLOGY

## 2020-08-13 PROCEDURE — 99999 PR PBB SHADOW E&M-EST. PATIENT-LVL III: CPT | Mod: PBBFAC,,, | Performed by: OBSTETRICS & GYNECOLOGY

## 2020-08-13 PROCEDURE — 0502F SUBSEQUENT PRENATAL CARE: CPT | Mod: CPTII,S$GLB,, | Performed by: OBSTETRICS & GYNECOLOGY

## 2020-08-13 PROCEDURE — 76816 OB US FOLLOW-UP PER FETUS: CPT | Mod: S$GLB,,, | Performed by: OBSTETRICS & GYNECOLOGY

## 2020-08-13 PROCEDURE — 76816 PR  US,PREGNANT UTERUS,F/U,TRANSABD APP: ICD-10-PCS | Mod: S$GLB,,, | Performed by: OBSTETRICS & GYNECOLOGY

## 2020-08-13 NOTE — PROGRESS NOTES
Good fetal movement.  No contractions, no vaginal bleeding, and no loss of fluid.    Rhogam and tdap completed.    Discussed  and risks associated with it with she and her partner.  Patient does desire to proceed with  if possible.  Normal ultrasound done today.

## 2020-08-24 ENCOUNTER — HOSPITAL ENCOUNTER (EMERGENCY)
Facility: OTHER | Age: 30
Discharge: HOME | End: 2020-08-24
Attending: OBSTETRICS & GYNECOLOGY
Payer: COMMERCIAL

## 2020-08-24 ENCOUNTER — HOSPITAL ENCOUNTER (EMERGENCY)
Facility: OTHER | Age: 30
Discharge: HOME | End: 2020-08-24
Payer: COMMERCIAL

## 2020-08-24 VITALS
TEMPERATURE: 98 F | SYSTOLIC BLOOD PRESSURE: 120 MMHG | DIASTOLIC BLOOD PRESSURE: 70 MMHG | RESPIRATION RATE: 16 BRPM | HEART RATE: 85 BPM | OXYGEN SATURATION: 98 %

## 2020-08-24 VITALS
RESPIRATION RATE: 18 BRPM | SYSTOLIC BLOOD PRESSURE: 100 MMHG | HEART RATE: 62 BPM | OXYGEN SATURATION: 100 % | DIASTOLIC BLOOD PRESSURE: 61 MMHG | TEMPERATURE: 98 F

## 2020-08-24 DIAGNOSIS — O47.00 PRETERM CONTRACTIONS: Primary | ICD-10-CM

## 2020-08-24 DIAGNOSIS — O36.8130 DECREASED FETAL MOVEMENTS IN THIRD TRIMESTER, SINGLE OR UNSPECIFIED FETUS: Primary | ICD-10-CM

## 2020-08-24 DIAGNOSIS — Z3A.34 34 WEEKS GESTATION OF PREGNANCY: ICD-10-CM

## 2020-08-24 DIAGNOSIS — Z98.891 HISTORY OF CESAREAN DELIVERY: ICD-10-CM

## 2020-08-24 DIAGNOSIS — Z87.51 HISTORY OF PRETERM DELIVERY: ICD-10-CM

## 2020-08-24 LAB
BILIRUB SERPL-MCNC: NORMAL MG/DL
BLOOD URINE, POC: NORMAL
COLOR, POC UA: NORMAL
GLUCOSE UR QL STRIP: NORMAL
KETONES UR QL STRIP: NORMAL
LEUKOCYTE ESTERASE URINE, POC: NORMAL
NITRITE, POC UA: NORMAL
PH, POC UA: NORMAL
PROTEIN, POC: NORMAL
SPECIFIC GRAVITY, POC UA: NORMAL
UROBILINOGEN, POC UA: NORMAL

## 2020-08-24 PROCEDURE — 59025 PR FETAL 2N-STRESS TEST: ICD-10-PCS | Mod: 26,,, | Performed by: OBSTETRICS & GYNECOLOGY

## 2020-08-24 PROCEDURE — 99283 EMERGENCY DEPT VISIT LOW MDM: CPT | Mod: ,,, | Performed by: OBSTETRICS & GYNECOLOGY

## 2020-08-24 PROCEDURE — 59025 FETAL NON-STRESS TEST: CPT | Mod: 76

## 2020-08-24 PROCEDURE — 99284 EMERGENCY DEPT VISIT MOD MDM: CPT | Mod: 25

## 2020-08-24 PROCEDURE — 81002 URINALYSIS NONAUTO W/O SCOPE: CPT

## 2020-08-24 PROCEDURE — 99283 PR EMERGENCY DEPT VISIT,LEVEL III: ICD-10-PCS | Mod: ,,, | Performed by: OBSTETRICS & GYNECOLOGY

## 2020-08-24 PROCEDURE — 96372 THER/PROPH/DIAG INJ SC/IM: CPT

## 2020-08-24 PROCEDURE — 99284 EMERGENCY DEPT VISIT MOD MDM: CPT | Mod: 25,27

## 2020-08-24 PROCEDURE — 59025 FETAL NON-STRESS TEST: CPT | Mod: 26,,, | Performed by: OBSTETRICS & GYNECOLOGY

## 2020-08-24 PROCEDURE — 63600175 PHARM REV CODE 636 W HCPCS: Performed by: STUDENT IN AN ORGANIZED HEALTH CARE EDUCATION/TRAINING PROGRAM

## 2020-08-24 PROCEDURE — 59025 FETAL NON-STRESS TEST: CPT

## 2020-08-24 RX ORDER — BETAMETHASONE SODIUM PHOSPHATE AND BETAMETHASONE ACETATE 3; 3 MG/ML; MG/ML
12 INJECTION, SUSPENSION INTRA-ARTICULAR; INTRALESIONAL; INTRAMUSCULAR; SOFT TISSUE
Status: DISCONTINUED | OUTPATIENT
Start: 2020-08-24 | End: 2020-08-24 | Stop reason: HOSPADM

## 2020-08-24 RX ADMIN — BETAMETHASONE SODIUM PHOSPHATE AND BETAMETHASONE ACETATE 12 MG: 3; 3 INJECTION, SUSPENSION INTRA-ARTICULAR; INTRALESIONAL; INTRAMUSCULAR at 09:08

## 2020-08-24 NOTE — ED PROVIDER NOTES
Encounter Date: 2020       History     Chief Complaint   Patient presents with    Contractions     Ms. Robbins is a 29 YO  at 34w0d presenting for contractions. She states the contractions started approximately 4 hours ago and are occurring every 3-4 minutes and are painful. Denies any LOF, vaginal bleeding, reports good fetal movement. Denies fevers, chills, urinary symptoms, changes in vaginal odor/discharge character.     This IUP is complicated by history of PTL at 29wga (fetal anomalies, demise at 6 mo old), asthma, Rh negative, h/o of LTCS, desires  (breech at last u/s on )            Review of patient's allergies indicates:  No Known Allergies  Past Medical History:   Diagnosis Date    Anemia      Past Surgical History:   Procedure Laterality Date     SECTION N/A 2018    Procedure:  SECTION;  Surgeon: Ina Harper DO;  Location: [facility] L&D;  Service: OB/GYN;  Laterality: N/A;     Family History   Problem Relation Age of Onset    Cardiomyopathy Neg Hx     Arrhythmia Neg Hx     Congenital heart disease Neg Hx     Early death Neg Hx     Heart attacks under age 50 Neg Hx     Pacemaker/defibrilator Neg Hx      Social History     Tobacco Use    Smoking status: Never Smoker    Smokeless tobacco: Never Used   Substance Use Topics    Alcohol use: No    Drug use: No     Review of Systems   Constitutional: Negative for fever.   HENT: Negative for sore throat.    Respiratory: Negative for shortness of breath.    Cardiovascular: Negative for chest pain.   Gastrointestinal: Positive for abdominal pain (contractions). Negative for nausea.   Genitourinary: Negative for dysuria, vaginal bleeding and vaginal discharge.   Musculoskeletal: Negative for back pain.   Skin: Negative for rash.   Neurological: Negative for weakness.   Hematological: Does not bruise/bleed easily.       Physical Exam     Initial Vitals [20 0755]   BP Pulse Resp Temp SpO2   (!) 117/57  (!) 59 18 97.8 °F (36.6 °C) 100 %      MAP       --         Physical Exam    Nursing note and vitals reviewed.  Constitutional: She appears well-developed and well-nourished.   HENT:   Head: Normocephalic and atraumatic.   Eyes: Conjunctivae and EOM are normal. Pupils are equal, round, and reactive to light.   Neck: Normal range of motion.   Cardiovascular: Normal rate.   Pulmonary/Chest: No respiratory distress.   Abdominal: Soft. There is no abdominal tenderness.   Genitourinary:    Vagina normal.     Musculoskeletal: Normal range of motion.   Neurological: She is alert and oriented to person, place, and time. She has normal reflexes.   Skin: Skin is warm and dry.   Psychiatric: She has a normal mood and affect. Her behavior is normal. Judgment and thought content normal.     OB LABOR EXAM:   Pre-Term Labor: No.   Membranes ruptured: No.   Method: Sterile vaginal exam per MD.   Vaginal Bleeding: none present.     Dilatation: 0.   Station: -3.   Effacement: 60% and 40%.   Amniotic Fluid Color: no fluid.   Amniotic Fluid Amount: none noted.         ED Course   Obtain Fetal nonstress test (NST)    Date/Time: 8/24/2020 10:47 AM  Performed by: Anel Santiago MD  Authorized by: Anel Santiago MD     Nonstress Test:     Variability:  6-25 BPM    Decelerations:  None    Accelerations:  15 bpm    Acoustic Stimulator: No      Baseline:  140    Uterine Irritability: No      Contractions:  Regular    Contraction Frequency:  3-5 min  Biophysical Profile:     Nonstress Test Interpretation: reactive      Overall Impression:  Reassuring      Labs Reviewed - No data to display       Imaging Results    None          Medical Decision Making:   ED Management:  Patient is 34 wga w/ significant history of PTD at 29 wga complaining of contractions since early this morning     -SVE: 0/40/-3--> 1/50/-3  -ctx Q3-5 min  -Urine negative  - NST reactive, reassuring   - Continue monitoring, PO hydration, repeat SVE unchanged   -BSUS vtx,  pt desires       Patient did not tolerate cervical checks well, there is a possibility that first check was also 1cm given that patient was in significant pain and it was difficult to find cervix due to posterior position.     Patient continues to have contractions on the monitor, though now less painful (per patient) and less frequently/patterned. Given this will dispo with strict return precautions including worsening/more frequent contractions, vaginal bleeding, LOF, decreased fetal movement.     Patient to follow up with primary OB in clinic tomorrow morning for second dose BMZ and repeat cervical check . Primary OB clinic notified.                                  Clinical Impression:       ICD-10-CM ICD-9-CM   1.  contractions  O47.9 644.00   2. 34 weeks gestation of pregnancy  Z3A.34 V22.2   3. History of  delivery  Z87.51 V13.21   4. History of  delivery  Z98.891 V45.89         Disposition:   Disposition: Discharged  Condition: Stable                        Anel Santiago MD  Resident  [phone]

## 2020-08-24 NOTE — DISCHARGE INSTRUCTIONS
Adapting to Pregnancy: Third Trimester    Although common during pregnancy, some discomforts may seem worse in the final weeks. Simple lifestyle changes can help. Take care of yourself. And ask your partner to help out with small tasks.  Limiting leg problems  Ways to combat leg issues:  · Wear support hose all day.  · Avoid snug shoes and clothes that bind, like tight pants and socks with elastic tops.  · Sit with your feet and legs raised often.  Caring for your breasts  Tips to follow include:  · Wash with plain water. Avoid using harsh soaps or rubbing alcohol. They may cause dryness.  · Wear a nursing bra for extra support. It can also hide any leaks from your nipples.  Controlling hemorrhoids  Ways to avoid hemorrhoids include:  · Eat foods that are high in fiber. Also, exercise and drink enough fluids. This will reduce constipation and hemorrhoids.  · Sleep and nap on your side. This limits pressure on the veins of your rectum.  · Try not to stand or sit for long periods.  Controlling back pain  As your body changes during pregnancy, your back must work in new ways. Back pain is due to many causes. Physical changes in your body can strain your back and its supporting muscles. Also, hormones (chemicals that carry messages throughout the body) increase during pregnancy. This can affect how your muscles and joints work together. All of these changes can lead to pain. Pain may be felt in the upper or lower back. Pain is also common in the pelvis. Some pregnant women have sciatica. This is pain caused by pressure on the sciatic nerve running down the back of the leg. Ask your healthcare provider for specific tips and exercises to help control your back pain.  Tips to help you rest  Good rest and sleep will help you feel better. Here are some ideas:  · Ask your partner to massage your shoulders, neck, or back.  · Limit the errands you do each day.  · Lie down in the afternoon or after work for a few  minutes.  · Take a warm bath before you go to sleep.  · Drink warm milk or teas without caffeine.  · Avoid coffee, black tea, and cola.  Stopping heartburn  · Avoid spicy or acidic foods.  · Eat small amounts more often. Eat slowly. · Wait 2 hours after eating before lying down.  · Sleep with your upper body raised 6 inches.   Managing mood swings  Ways to manage mood swings include:  · Know that mood changes are normal.  · Exercise often, but get plenty of rest.  · Address any concerns and limit stress. Talking to your partner, other women, or your healthcare provider may help.  Dealing with urinary frequency  Tips to deal with having to urinate often include:  · Drink plenty of water all day. If you drink a lot in the evening, though, you may have to get up more in the night.  · Limit coffee, black tea, and cola.  Date Last Reviewed: 8/16/2015  © 4438-5079 [employer]. [address] [place] [address]. All rights reserved. This information is not intended as a substitute for professional medical care. Always follow your healthcare professional's instructions.    Contact your primary OB or after hours at [phone] if you experience any of the following:    Contractions every 3-5 minutes for 2 or more hours.   A sudden gush or constant leaking of fluid.  Heavy vaginal bleeding.   If you're not feeling your baby move as much or not at all.  If you experience a constant headache, blurry vision, pain underneath your right rib, or sudden swelling of your hands, feet, and face.     Remember to stay hydrated; drink 8-10 bottles of water a day.     Maintain all follow-up appointments.

## 2020-08-24 NOTE — ED TRIAGE NOTES
"Pt presents to [facility] c/o contractions every 2-4 minutes.  Says they started 3-4 hours ago.  Pt denies pain, but says she can feel "tightening" in her abdominal area.  Denies LOF and VB but endorses FM.  Dr. Santiago notified of pt's arrival.   "

## 2020-08-25 ENCOUNTER — ROUTINE PRENATAL (OUTPATIENT)
Dept: OBSTETRICS AND GYNECOLOGY | Facility: CLINIC | Age: 30
End: 2020-08-25
Payer: COMMERCIAL

## 2020-08-25 VITALS — DIASTOLIC BLOOD PRESSURE: 58 MMHG | WEIGHT: 120.56 LBS | SYSTOLIC BLOOD PRESSURE: 98 MMHG | BODY MASS INDEX: 21.37 KG/M2

## 2020-08-25 DIAGNOSIS — Z34.92 NORMAL PREGNANCY IN SECOND TRIMESTER: Primary | ICD-10-CM

## 2020-08-25 PROCEDURE — 96372 PR INJECTION,THERAP/PROPH/DIAG2ST, IM OR SUBCUT: ICD-10-PCS | Mod: S$GLB,,, | Performed by: OBSTETRICS & GYNECOLOGY

## 2020-08-25 PROCEDURE — 99999 PR PBB SHADOW E&M-EST. PATIENT-LVL III: ICD-10-PCS | Mod: PBBFAC,,, | Performed by: OBSTETRICS & GYNECOLOGY

## 2020-08-25 PROCEDURE — 0502F PR SUBSEQUENT PRENATAL CARE: ICD-10-PCS | Mod: CPTII,S$GLB,, | Performed by: OBSTETRICS & GYNECOLOGY

## 2020-08-25 PROCEDURE — 0502F SUBSEQUENT PRENATAL CARE: CPT | Mod: CPTII,S$GLB,, | Performed by: OBSTETRICS & GYNECOLOGY

## 2020-08-25 PROCEDURE — 96372 THER/PROPH/DIAG INJ SC/IM: CPT | Mod: S$GLB,,, | Performed by: OBSTETRICS & GYNECOLOGY

## 2020-08-25 PROCEDURE — 99999 PR PBB SHADOW E&M-EST. PATIENT-LVL III: CPT | Mod: PBBFAC,,, | Performed by: OBSTETRICS & GYNECOLOGY

## 2020-08-25 RX ORDER — BETAMETHASONE SODIUM PHOSPHATE AND BETAMETHASONE ACETATE 3; 3 MG/ML; MG/ML
12 INJECTION, SUSPENSION INTRA-ARTICULAR; INTRALESIONAL; INTRAMUSCULAR; SOFT TISSUE ONCE
Status: COMPLETED | OUTPATIENT
Start: 2020-08-25 | End: 2020-08-25

## 2020-08-25 RX ADMIN — BETAMETHASONE SODIUM PHOSPHATE AND BETAMETHASONE ACETATE 12 MG: 3; 3 INJECTION, SUSPENSION INTRA-ARTICULAR; INTRALESIONAL; INTRAMUSCULAR; SOFT TISSUE at 01:08

## 2020-08-25 NOTE — ED PROVIDER NOTES
Encounter Date: 2020       History     Chief Complaint   Patient presents with    Decreased Fetal Movement     Ximena Robbins is a 30 y.o. [id number] at 34w0d presents complaining of decreased fetal movement. The patient was seen in [facility] earlier this afternoon for painful contractions and was found to be 1/50/-3. BMZ was given during this visit and patient was diagnosed with threatened PTL. The patient reports that her contractions have improved greatly since this afternoon, now only occasionally painful. However, she has noticed less fetal movement than usual since her evaluation. She has only noticed flickers of movement since 1700 and has not noticed any movements since arrival to [facility]. She denies any LOF or abnormal vaginal discharge.    This IUP is complicated by PTL at 29wga (fetal anomalies, demise at 6 mo old), asthma, Rh negative, h/o of LTCS, and desires .        Review of patient's allergies indicates:  No Known Allergies  Past Medical History:   Diagnosis Date    Anemia      Past Surgical History:   Procedure Laterality Date     SECTION N/A 2018    Procedure:  SECTION;  Surgeon: Ina Harper DO;  Location: [facility] L&D;  Service: OB/GYN;  Laterality: N/A;     Family History   Problem Relation Age of Onset    Cardiomyopathy Neg Hx     Arrhythmia Neg Hx     Congenital heart disease Neg Hx     Early death Neg Hx     Heart attacks under age 50 Neg Hx     Pacemaker/defibrilator Neg Hx      Social History     Tobacco Use    Smoking status: Never Smoker    Smokeless tobacco: Never Used   Substance Use Topics    Alcohol use: No    Drug use: No     Review of Systems   Constitutional: Negative for chills, fatigue and fever.   HENT: Negative for congestion.    Eyes: Negative for visual disturbance.   Respiratory: Negative for shortness of breath.    Cardiovascular: Negative for chest pain.   Gastrointestinal: Positive for abdominal pain (Occasional contractions). Negative  for nausea and vomiting.   Genitourinary: Negative for dysuria, flank pain, pelvic pain, vaginal bleeding and vaginal discharge.   Musculoskeletal: Negative for back pain.   Skin: Negative for rash.   Neurological: Negative for headaches.       Physical Exam     Initial Vitals [20 2130]   BP Pulse Resp Temp SpO2   120/70 67 16 97.9 °F (36.6 °C) 98 %      MAP       --         Physical Exam    Vitals reviewed.  Constitutional: She appears well-developed and well-nourished. No distress.   HENT:   Head: Normocephalic and atraumatic.   Eyes: EOM are normal.   Neck: Normal range of motion.   Cardiovascular: Normal rate.   Pulmonary/Chest: Breath sounds normal. No respiratory distress.   Abdominal: Soft. There is no abdominal tenderness. There is no rebound and no guarding.   Gravid uterus   Genitourinary:    Uterus normal.      Genitourinary Comments: SVE deferred per patient request     Musculoskeletal: Normal range of motion. No tenderness or edema.   Neurological: She is alert and oriented to person, place, and time.   Skin: Skin is warm and dry.   Psychiatric: She has a normal mood and affect. Thought content normal.         ED Course   Obtain Fetal nonstress test (NST)    Date/Time: 2020 9:49 PM  Performed by: Juliet Smalls MD  Authorized by: Juliet Smalls MD     Nonstress Test:     Variability:  6-25 BPM    Decelerations:  None    Accelerations:  15 bpm    Baseline:  130    Uterine Irritability: Yes      Contractions:  Irregular  Biophysical Profile:     Nonstress Test Interpretation: reactive      Overall Impression:  Reassuring      Labs Reviewed - No data to display       Imaging Results    None          Medical Decision Making:   Initial Assessment:   Ximena Robbins is a 30 y.o. [id number] at 34w0d presents complaining of decreased fetal movement.    ED Management:  VSS. NST reactive and reassuring with very irregular contractions. Patient declined SVE due to improvement in pain from  contractions and multiple SVE earlier today.    BSUS performed: fetus cephalic and active, gross movements seen. MVP 3.8 cm. Patient reports feeling kicks after ultrasound, feeling reassured. Patient was subsequently discharged home in stable condition with plans to follow up with primary OB tomorrow.    Other:   I have discussed this case with another health care provider.       <> Summary of the Discussion: Dr. Christensen              Attending Attestation:   Physician Attestation Statement for Resident:  As the supervising MD   Physician Attestation Statement: I have personally seen and examined this patient.   I agree with the above history. -:   As the supervising MD I agree with the above PE.    As the supervising MD I agree with the above treatment, course, plan, and disposition.   -: Patient evaluated and found to be stable, agree with resident's assessment and plan.  I was personally present during the critical portions of the procedure(s) performed by the resident and was immediately available in the ED to provide services and assistance as needed during the entire procedure.  I have reviewed the following: old records at this facility.                    ED Course as of Aug 24 2321   Mon Aug 24, 2020   2151 NST reactive/reassuring    [AR]      ED Course User Index  [AR] Rena Christensen MD            Clinical Impression:       ICD-10-CM ICD-9-CM   1. Decreased fetal movements in third trimester, single or unspecified fetus  O36.8130 655.73   2. 34 weeks gestation of pregnancy  Z3A.34 V22.2     Disposition:   Disposition: Discharged  Condition: Stable     ED Disposition Condition    Discharge Stable        ED Prescriptions     None        Follow-up Information    None                   Juliet Smalls M.D.  OB/GYN PGY-2     Juliet Smalls MD  Resident  08/24/20 2321       Rena Christensen MD  08/25/20 2000

## 2020-08-25 NOTE — DISCHARGE INSTRUCTIONS
Call clinic [phone] or L & D after hours at [phone] for vaginal bleeding, leakage of fluids, contractions 4-5 in one hour, decreased fetal movements ( 10 kicks in 2 hours), headache not relieved by Tylenol, blurry vision, or temp of 100.4 or greater.  Begin doing fetal kick counts, at least 10 movements in 2 hours starting at 28 weeks gestation.  Keep next clinic appointment.

## 2020-08-25 NOTE — ED NOTES
Pt arrived stating that she has not felt the baby move as much since 5 pm. She states that the baby is usually very active, and it feels like the baby is moving less this afternoon. Pt reports intermittent contractions. Pt denies vaginal bleeding and leakage of fluid. Pt placed on the monitor. Vital signs stable. Dr. Smalls notified.

## 2020-08-28 ENCOUNTER — TELEPHONE (OUTPATIENT)
Dept: OBSTETRICS AND GYNECOLOGY | Facility: CLINIC | Age: 30
End: 2020-08-28

## 2020-08-28 ENCOUNTER — HOSPITAL ENCOUNTER (EMERGENCY)
Facility: OTHER | Age: 30
Discharge: HOME | End: 2020-08-28
Attending: OBSTETRICS & GYNECOLOGY
Payer: COMMERCIAL

## 2020-08-28 ENCOUNTER — NURSE TRIAGE (OUTPATIENT)
Dept: ADMINISTRATIVE | Facility: CLINIC | Age: 30
End: 2020-08-28

## 2020-08-28 VITALS
RESPIRATION RATE: 18 BRPM | TEMPERATURE: 98 F | OXYGEN SATURATION: 99 % | SYSTOLIC BLOOD PRESSURE: 108 MMHG | HEART RATE: 71 BPM | DIASTOLIC BLOOD PRESSURE: 60 MMHG

## 2020-08-28 DIAGNOSIS — Z3A.34 34 WEEKS GESTATION OF PREGNANCY: ICD-10-CM

## 2020-08-28 DIAGNOSIS — Z98.891 H/O CESAREAN SECTION: ICD-10-CM

## 2020-08-28 DIAGNOSIS — Z03.71 ENCOUNTER FOR SUSPECTED PREMATURE RUPTURE OF AMNIOTIC MEMBRANES, WITH RUPTURE OF MEMBRANES NOT FOUND: Primary | ICD-10-CM

## 2020-08-28 PROCEDURE — 99284 EMERGENCY DEPT VISIT MOD MDM: CPT | Mod: 25

## 2020-08-28 PROCEDURE — 59025 FETAL NON-STRESS TEST: CPT | Mod: 26,,, | Performed by: OBSTETRICS & GYNECOLOGY

## 2020-08-28 PROCEDURE — 99284 PR EMERGENCY DEPT VISIT,LEVEL IV: ICD-10-PCS | Mod: 25,,, | Performed by: OBSTETRICS & GYNECOLOGY

## 2020-08-28 PROCEDURE — 99284 EMERGENCY DEPT VISIT MOD MDM: CPT | Mod: 25,,, | Performed by: OBSTETRICS & GYNECOLOGY

## 2020-08-28 PROCEDURE — 59025 FETAL NON-STRESS TEST: CPT

## 2020-08-28 PROCEDURE — 59025 PR FETAL 2N-STRESS TEST: ICD-10-PCS | Mod: 26,,, | Performed by: OBSTETRICS & GYNECOLOGY

## 2020-08-28 NOTE — TELEPHONE ENCOUNTER
"3 days ago,  contractions, 1 cm dilated. Yesterday yellowish vaginal discharge, wearing a pad. No worsening of contractions or abdominal pain. Patient is 34 weeks 4 days. Very thin fluid rather than discharge states that it is constant. Advised to go to L&D; verbalized understanding.     Reason for Disposition   Leakage of fluid from vagina    Additional Information   Negative: SEVERE constant abdominal pain (e.g., excruciating)   Negative: SEVERE bleeding (e.g., continuous red blood from vagina, or large blood clots)   Negative: Uncontrollable urge to push (i.e., feels like baby is coming out now)   Negative: Umbilical cord hanging out of the vagina (shiny, white, curled appearance, "like telephone cord")   Negative: Can see baby   Negative: Sounds like a life-threatening emergency to the triager   Negative: < 20 weeks pregnant    Protocols used: ST PREGNANCY - RUPTURE OF [id number]      "

## 2020-08-28 NOTE — TELEPHONE ENCOUNTER
Called patient to let her know that her appointment will be rescheduled due to  being in surgery that day. LVM with this information. Appointment Rescheduled.

## 2020-08-29 NOTE — ED PROVIDER NOTES
Encounter Date: 2020       History     Chief Complaint   Patient presents with    Rupture of Membranes     Ximena Robbins is a 30 y.o. [id number] at 34w4d presents complaining of vaginal discharge. The patient states that she was seen in the [facility] 3 days ago for decreased fetal movement and threatened PTL. Since that visit she has had a new yellow vaginal discharge. She is unsure if she is also experiencing leakage of fluid. She also endorses some vaginal spotting that has since resolved. Denies fevers, chills, nausea, vomiting, or dysuria.  This IUP is complicated by hx ofPTL at 29wga (fetal anomalies, demise at 6 mo old), asthma, Rh negative, h/o of LTCS, and desires  .  Patient reports contractions, denies vaginal bleeding, reports LOF.   Fetal Movement: normal.          Review of patient's allergies indicates:  No Known Allergies  Past Medical History:   Diagnosis Date    Anemia      Past Surgical History:   Procedure Laterality Date     SECTION N/A 2018    Procedure:  SECTION;  Surgeon: Ina Harper DO;  Location: [facility], [place], operated by [facility] L&D;  Service: OB/GYN;  Laterality: N/A;     Family History   Problem Relation Age of Onset    Cardiomyopathy Neg Hx     Arrhythmia Neg Hx     Congenital heart disease Neg Hx     Early death Neg Hx     Heart attacks under age 50 Neg Hx     Pacemaker/defibrilator Neg Hx      Social History     Tobacco Use    Smoking status: Never Smoker    Smokeless tobacco: Never Used   Substance Use Topics    Alcohol use: No    Drug use: No     Review of Systems   Constitutional: Negative for chills, fatigue and fever.   HENT: Negative for congestion, rhinorrhea and sore throat.    Eyes: Negative for visual disturbance.   Respiratory: Negative for cough, chest tightness and shortness of breath.    Cardiovascular: Negative for chest pain and leg swelling.   Gastrointestinal: Positive for abdominal pain. Negative for nausea and vomiting.   Genitourinary: Positive for  vaginal discharge. Negative for dysuria, pelvic pain and vaginal bleeding.   Musculoskeletal: Negative for back pain.   Neurological: Negative for dizziness and headaches.       Physical Exam     Initial Vitals   BP Pulse Resp Temp SpO2   08/28/20 1952 08/28/20 1952 08/28/20 1952 08/28/20 1952 08/28/20 1954   108/60 71 18 97.8 °F (36.6 °C) 99 %      MAP       --                Physical Exam    Constitutional: She appears well-developed and well-nourished. No distress.   HENT:   Head: Normocephalic and atraumatic.   Eyes: Conjunctivae and EOM are normal.   Neck: Normal range of motion. No thyromegaly present.   Cardiovascular: Normal rate.   Pulmonary/Chest:   Normal work of breathing.   Abdominal: Soft. There is no abdominal tenderness. There is no rebound and no guarding.   Gravid uterus.   Genitourinary:    Vaginal discharge (Mucous-like yellow/green discharge. No pooling of fluid) present.     Musculoskeletal: Normal range of motion. No edema.   Neurological: She is alert and oriented to person, place, and time.   Skin: Skin is warm and dry.   Psychiatric: She has a normal mood and affect. Thought content normal.     OB LABOR EXAM:   Pre-Term Labor: No.   Membranes ruptured: No.   Method: Sterile vaginal exam per MD and Sterile speculum exam per MD.   Vaginal Bleeding: none present.     Dilatation: 1.   Station: -3.   Effacement: 40%.             ED Course   Obtain Fetal nonstress test (NST)    Date/Time: 8/28/2020 9:42 PM  Performed by: Felecia Omer MD  Authorized by: Felecia Omer MD     Nonstress Test:     Variability:  6-25 BPM    Decelerations:  None    Accelerations:  10 bpm    Baseline:  130    Uterine Irritability: No      Contractions:  Not present  Biophysical Profile:     Nonstress Test Interpretation: reactive      Overall Impression:  Reassuring      Labs Reviewed - No data to display       Imaging Results    None          Medical Decision Making:   Initial Assessment:   Ximena Robbins is a 30  y.o. [id number] at 34w4d presents complaining of vaginal discharge.   ED Management:  - VSS, afebrile  - Patient not william on tocometry  - Yellow/green discharge without foul odor noted on exam  - No pooling of fluid  - Wet prep did not reveal WBCs, clue cells, or trichomonas  - Discussed with patient that her discharge is most likely physiologic and not infectious  - Patient stable for discharge home. Labor precautions given. Patient verbalized understanding, all questions answered. Plan discussed with hospitalist on call, who is in agreement with plan.               Attending Attestation:   Physician Attestation Statement for Resident:  As the supervising MD   Physician Attestation Statement: I have personally seen and examined this patient.   I agree with the above history. -:   As the supervising MD I agree with the above PE.    As the supervising MD I agree with the above treatment, course, plan, and disposition.   -:   NST  I independently reviewed the fetal non-stress test with the following interpretation:  130 BPM baseline  Variability: moderate  Accelerations: present  Decelerations: absent  Contractions: none  Category 1    Clinical Interpretation:reactive    Patient evaluated and found to be stable, agree with resident's assessment of physiologic vaginal discharge with no s/s  labor or infectious vaginosis and plan to discharge to home with precautions re s/s  labor.  I was personally present during the critical portions of the procedure(s) performed by the resident and was immediately available in the ED to provide services and assistance as needed during the entire procedure.  I have reviewed and agree with the residents interpretation of the following: lab data.  I have reviewed the following: old records at this facility.                      Felecia Omer M.D.  OB/GYN PGY-1               Clinical Impression:       ICD-10-CM ICD-9-CM   1. Encounter for suspected premature rupture of  amniotic membranes, with rupture of membranes not found  Z03.71 V89.01   2. 34 weeks gestation of pregnancy  Z3A.34 V22.2   3. H/O  section  Z98.891 V45.89             ED Disposition Condition    Discharge Good        ED Prescriptions     None        Follow-up Information    None                                    Felecia Omer MD  Resident  [phone]       Malu Watson MD  [phone]

## 2020-08-29 NOTE — ED NOTES
Patient discharged home to self care. Discharge instructions reviewed with patient and significant other (via telephone). Patient educated on signs and symptoms of  labor. Patient instructed to present to OB ED for decreased fetal movement, leakage of fluid, vaginal bleeding, or contractions 3-5 minutes apart. Patient and significant other verbalized understanding.

## 2020-08-29 NOTE — DISCHARGE INSTRUCTIONS
Call or present to Labor and Delivery [phone] or OB ED for any of the following:   - Leakage of fluid like your water broke  - Vaginal bleeding  - Not feeling baby move normally   - Contractions 3-5 minutes apart for 2 hours or more         Understanding  Labor  Going into labor before your 37th week of pregnancy is called  labor.  labor can cause your baby to be born too soon. This can lead to a number of health problems that may affect your baby.     Before labor, the cervix is thick and closed.       In  labor, the cervix begins to efface (thin) and dilate (open).      Symptoms of  labor  If you believe youre having  labor, get medical help right away. Contractions alone dont mean youre in  labor. What matters more are changes in your cervix (the lower end of the uterus). Symptoms of  labor include:  · Four or more contractions per hour  · Strong contractions  · Constant menstrual-like cramping  · Low-back pain  · Mucous or bloody vaginal discharge  · Bleeding or spotting in the second or third trimester  Evaluating  labor  Your healthcare provider will try to find out whether youre in  labor or whether youre just having contractions. He or she may watch you for a few hours. The following tests may be done:  · Pelvic exam to see if your cervix has effaced (thinned) and dilated (opened)  · Uterine activity monitoring to detect contractions  · Fetal monitoring to check the health of your baby  · Ultrasound to check your babys size and position  · Amniocentesis to check how mature your babys lungs are  Caring for yourself at home  If you have  contractions, but your cervix is still thick and closed, your healthcare provider may ask you to do the following at home:  · Drink plenty of water.  · Do fewer activities.  · Rest in bed on your side.  · Avoid intercourse and nipple stimulation.  When to call your healthcare  provider  Call your healthcare provider if you notice any of these:  · Four or more contractions per hour  · Bag of water breaks  · Bleeding or spotting   If you need hospital care   labor often requires that you have hospital care and complete bed rest. You may have an IV (intravenous) line to get fluids. You may be given pills or injections to help prevent contractions. Finally, you may receive medicine (corticosteroids) that helps your babys lungs mature more rapidly.  Are you at risk?  Any pregnant woman can have  labor. It may start for no reason. But these risk factors can increase your chances:  · Past  labor or past early birth  · Smoking, drug, or alcohol use during pregnancy  · Multiple fetuses (twins or more)  · Problems with the shape of the uterus  · Bleeding during the pregnancy  The dangers of  birth  A baby born too soon may have health problems. This is because the baby didnt have enough time to mature. Some of the risks for your baby include:  · Not breastfeeding or feeding well  · Having immature lungs  · Bleeding in the brain  · Dying  Reaching term  Your goal is to get as close to term as you can before giving birth. The closer you get to term, the higher your chance of having a healthy baby. Work with your healthcare provider. Together, you can take steps that may keep you from giving birth too early.  Date Last Reviewed: 2016-2017 [employer]. [address] [place] [address], [place] [address]. All rights reserved. This information is not intended as a substitute for professional medical care. Always follow your healthcare professional's instructions.

## 2020-09-03 ENCOUNTER — ROUTINE PRENATAL (OUTPATIENT)
Dept: OBSTETRICS AND GYNECOLOGY | Facility: CLINIC | Age: 30
End: 2020-09-03
Payer: COMMERCIAL

## 2020-09-03 VITALS
WEIGHT: 123.44 LBS | DIASTOLIC BLOOD PRESSURE: 62 MMHG | BODY MASS INDEX: 21.88 KG/M2 | SYSTOLIC BLOOD PRESSURE: 100 MMHG

## 2020-09-03 DIAGNOSIS — O09.893 SUPERVISION OF OTHER HIGH RISK PREGNANCIES, THIRD TRIMESTER: Primary | ICD-10-CM

## 2020-09-03 DIAGNOSIS — Z3A.35 35 WEEKS GESTATION OF PREGNANCY: ICD-10-CM

## 2020-09-03 PROCEDURE — 0502F SUBSEQUENT PRENATAL CARE: CPT | Mod: CPTII,S$GLB,, | Performed by: OBSTETRICS & GYNECOLOGY

## 2020-09-03 PROCEDURE — 99999 PR PBB SHADOW E&M-EST. PATIENT-LVL III: ICD-10-PCS | Mod: PBBFAC,,, | Performed by: OBSTETRICS & GYNECOLOGY

## 2020-09-03 PROCEDURE — 99999 PR PBB SHADOW E&M-EST. PATIENT-LVL III: CPT | Mod: PBBFAC,,, | Performed by: OBSTETRICS & GYNECOLOGY

## 2020-09-03 PROCEDURE — 87081 CULTURE SCREEN ONLY: CPT

## 2020-09-03 PROCEDURE — 0502F PR SUBSEQUENT PRENATAL CARE: ICD-10-PCS | Mod: CPTII,S$GLB,, | Performed by: OBSTETRICS & GYNECOLOGY

## 2020-09-03 NOTE — PROGRESS NOTES
Doing well. Discussed mode of delivery - patient desires  and desires IOL if no spontaneous labor. Discussed risks/benefits and limitations. Good FM. Denies VB, LOF, CTX. Labor, ROM and bleeding precautions. GBS collected. F/U 1 week with labs.

## 2020-09-05 LAB — BACTERIA SPEC AEROBE CULT: NORMAL

## 2020-09-15 ENCOUNTER — ROUTINE PRENATAL (OUTPATIENT)
Dept: OBSTETRICS AND GYNECOLOGY | Facility: CLINIC | Age: 30
End: 2020-09-15
Payer: COMMERCIAL

## 2020-09-15 VITALS
BODY MASS INDEX: 21.99 KG/M2 | WEIGHT: 124.13 LBS | DIASTOLIC BLOOD PRESSURE: 60 MMHG | SYSTOLIC BLOOD PRESSURE: 100 MMHG

## 2020-09-15 DIAGNOSIS — Z98.891 HISTORY OF CESAREAN DELIVERY: ICD-10-CM

## 2020-09-15 DIAGNOSIS — O09.893 SUPERVISION OF OTHER HIGH RISK PREGNANCIES, THIRD TRIMESTER: Primary | ICD-10-CM

## 2020-09-15 DIAGNOSIS — Z34.90 ENCOUNTER FOR ELECTIVE INDUCTION OF LABOR: ICD-10-CM

## 2020-09-15 PROCEDURE — 0502F PR SUBSEQUENT PRENATAL CARE: ICD-10-PCS | Mod: CPTII,S$GLB,, | Performed by: OBSTETRICS & GYNECOLOGY

## 2020-09-15 PROCEDURE — 99999 PR PBB SHADOW E&M-EST. PATIENT-LVL II: CPT | Mod: PBBFAC,,, | Performed by: OBSTETRICS & GYNECOLOGY

## 2020-09-15 PROCEDURE — 99999 PR PBB SHADOW E&M-EST. PATIENT-LVL II: ICD-10-PCS | Mod: PBBFAC,,, | Performed by: OBSTETRICS & GYNECOLOGY

## 2020-09-15 PROCEDURE — 0502F SUBSEQUENT PRENATAL CARE: CPT | Mod: CPTII,S$GLB,, | Performed by: OBSTETRICS & GYNECOLOGY

## 2020-09-15 NOTE — PROGRESS NOTES
Declined cervical exam. Felt the baby had flipped to breech but vertex on US. Discussed IOL around 39 weeks if no spontaneous labor. Reviewed r/b/a of IOL and limitations. Order placed.

## 2020-09-22 ENCOUNTER — ROUTINE PRENATAL (OUTPATIENT)
Dept: OBSTETRICS AND GYNECOLOGY | Facility: CLINIC | Age: 30
End: 2020-09-22
Payer: COMMERCIAL

## 2020-09-22 VITALS
DIASTOLIC BLOOD PRESSURE: 60 MMHG | BODY MASS INDEX: 21.99 KG/M2 | SYSTOLIC BLOOD PRESSURE: 100 MMHG | WEIGHT: 124.13 LBS

## 2020-09-22 DIAGNOSIS — O09.893 SUPERVISION OF OTHER HIGH RISK PREGNANCIES, THIRD TRIMESTER: Primary | ICD-10-CM

## 2020-09-22 DIAGNOSIS — Z98.891 HISTORY OF CESAREAN DELIVERY: ICD-10-CM

## 2020-09-22 DIAGNOSIS — Z01.818 PREOP EXAMINATION: ICD-10-CM

## 2020-09-22 PROCEDURE — 99999 PR PBB SHADOW E&M-EST. PATIENT-LVL II: CPT | Mod: PBBFAC,,, | Performed by: OBSTETRICS & GYNECOLOGY

## 2020-09-22 PROCEDURE — 0502F PR SUBSEQUENT PRENATAL CARE: ICD-10-PCS | Mod: CPTII,S$GLB,, | Performed by: OBSTETRICS & GYNECOLOGY

## 2020-09-22 PROCEDURE — 0502F SUBSEQUENT PRENATAL CARE: CPT | Mod: CPTII,S$GLB,, | Performed by: OBSTETRICS & GYNECOLOGY

## 2020-09-22 PROCEDURE — 99999 PR PBB SHADOW E&M-EST. PATIENT-LVL II: ICD-10-PCS | Mod: PBBFAC,,, | Performed by: OBSTETRICS & GYNECOLOGY

## 2020-09-22 RX ORDER — OXYTOCIN/RINGER'S LACTATE 30/500 ML
334 PLASTIC BAG, INJECTION (ML) INTRAVENOUS ONCE
Status: CANCELLED | OUTPATIENT
Start: 2020-09-22 | End: 2020-09-22

## 2020-09-22 RX ORDER — OXYTOCIN/RINGER'S LACTATE 30/500 ML
95 PLASTIC BAG, INJECTION (ML) INTRAVENOUS ONCE
Status: CANCELLED | OUTPATIENT
Start: 2020-09-22 | End: 2020-09-22

## 2020-09-22 RX ORDER — SIMETHICONE 80 MG
1 TABLET,CHEWABLE ORAL 4 TIMES DAILY PRN
Status: CANCELLED | OUTPATIENT
Start: 2020-09-22

## 2020-09-22 RX ORDER — CALCIUM CARBONATE 200(500)MG
500 TABLET,CHEWABLE ORAL 3 TIMES DAILY PRN
Status: CANCELLED | OUTPATIENT
Start: 2020-09-22

## 2020-09-22 RX ORDER — SODIUM CHLORIDE, SODIUM LACTATE, POTASSIUM CHLORIDE, CALCIUM CHLORIDE 600; 310; 30; 20 MG/100ML; MG/100ML; MG/100ML; MG/100ML
INJECTION, SOLUTION INTRAVENOUS CONTINUOUS
Status: CANCELLED | OUTPATIENT
Start: 2020-09-22

## 2020-09-22 RX ORDER — CARBOPROST TROMETHAMINE 250 UG/ML
250 INJECTION, SOLUTION INTRAMUSCULAR
Status: CANCELLED | OUTPATIENT
Start: 2020-09-22

## 2020-09-22 RX ORDER — MISOPROSTOL 100 UG/1
800 TABLET ORAL
Status: CANCELLED | OUTPATIENT
Start: 2020-09-22

## 2020-09-22 RX ORDER — SODIUM CHLORIDE 9 MG/ML
INJECTION, SOLUTION INTRAVENOUS
Status: CANCELLED | OUTPATIENT
Start: 2020-09-22

## 2020-09-22 RX ORDER — METHYLERGONOVINE MALEATE 0.2 MG/ML
200 INJECTION INTRAVENOUS
Status: CANCELLED | OUTPATIENT
Start: 2020-09-22

## 2020-09-22 RX ORDER — ONDANSETRON 4 MG/1
8 TABLET, ORALLY DISINTEGRATING ORAL EVERY 8 HOURS PRN
Status: CANCELLED | OUTPATIENT
Start: 2020-09-22

## 2020-09-22 RX ORDER — OXYTOCIN/RINGER'S LACTATE 30/500 ML
2 PLASTIC BAG, INJECTION (ML) INTRAVENOUS CONTINUOUS
Status: CANCELLED | OUTPATIENT
Start: 2020-09-22

## 2020-09-23 NOTE — H&P
HISTORY AND PHYSICAL                                                OBSTETRICS          Subjective:       Ximena Robbins is a 30 y.o.  female with IUP at 39w0d weeks gestation presents for scheduled induction of labor.   This IUP is complicated by history of LTCS at 29 weeks for NRFHTs (fetal anomalies) resulting in  death, Rh neg, mild intermittent asthma, language barrier.  Patient denies contractions, denies vaginal bleeding, denies LOF.   Fetal Movement: normal.     PMHx:   Past Medical History:   Diagnosis Date    Anemia        PSHx:   Past Surgical History:   Procedure Laterality Date     SECTION N/A 2018    Procedure:  SECTION;  Surgeon: Ina Harper DO;  Location: [facility], [place] L&D;  Service: OB/GYN;  Laterality: N/A;       All: Review of patient's allergies indicates:  No Known Allergies    Meds: (Not in a hospital admission)      SH:   Social History     Socioeconomic History    Marital status:      Spouse name: Not on file    Number of children: Not on file    Years of education: Not on file    Highest education level: Not on file   Occupational History    Not on file   Social Needs    Financial resource strain: Not on file    Food insecurity     Worry: Not on file     Inability: Not on file    Transportation needs     Medical: Not on file     Non-medical: Not on file   Tobacco Use    Smoking status: Never Smoker    Smokeless tobacco: Never Used   Substance and Sexual Activity    Alcohol use: No    Drug use: No    Sexual activity: Not on file   Lifestyle    Physical activity     Days per week: Not on file     Minutes per session: Not on file    Stress: Not on file   Relationships    Social connections     Talks on phone: Not on file     Gets together: Not on file     Attends Sikh service: Not on file     Active member of club or organization: Not on file     Attends meetings of clubs or organizations: Not on file     Relationship status:  Not on file   Other Topics Concern    Not on file   Social History Narrative    Not on file       FH:   Family History   Problem Relation Age of Onset    Cardiomyopathy Neg Hx     Arrhythmia Neg Hx     Congenital heart disease Neg Hx     Early death Neg Hx     Heart attacks under age 50 Neg Hx     Pacemaker/defibrilator Neg Hx        OBHx:   OB History    Para Term  AB Living   2 1 0 1 0 0   SAB TAB Ectopic Multiple Live Births   0 0 0 0 0      # Outcome Date GA Lbr Axel/2nd Weight Sex Delivery Anes PTL Lv   2 Current            1  18 29w5d   M CS-LTranv Spinal  FD      Name: ARIK KAYE      Apgar1: 2  Apgar5: 7       Objective:       /60   Wt 56.3 kg (124 lb 1.9 oz)   LMP 2019   BMI 21.99 kg/m²     Vitals:    20 1501   BP: 100/60   Weight: 56.3 kg (124 lb 1.9 oz)       General:   alert, appears stated age and cooperative   Lungs:   clear to auscultation bilaterally   Heart:   regular rate and rhythm, S1, S2 normal, no murmur, click, rub or gallop   Abdomen:  soft, non-tender; bowel sounds normal; no masses,  no organomegaly   Extremities negative edema, negative erythema   FHT: Defer to admission                 TOCO:    Presentations: cephalic by ultrasound on 9/15/20   Cervix:     Dilation: 1cm    Effacement: 50%    Station:  -3    Consistency: medium    Position: posterior   Sterile Speculum Exam: deferred    Lab Review  Blood Type O NEG  GBBS: negative  Rubella: Immune  RPR: NR  HIV: negative  HepB: negative       Assessment:     30 y.o.  female with IUP at 39w0d weeks gestation presents for scheduled induction of labor.     There are no hospital problems to display for this patient.         Plan:      Risks, benefits, alternatives and possible complications have been discussed in detail with the patient.   - Consents signed and to chart in office. Reviewed r/b/a of TOLAC induction and patient desires to proceed.  - COVID test scheduled.  -  Admit to Labor and Delivery unit  - Epidural per Anesthesia  - Draw CBC, T&S  - Notify Staff  - Recheck in 2 hrs or PRN    Post-Partum Hemorrhage risk - medium

## 2020-09-23 NOTE — PROGRESS NOTES
Reports contractions q5min this am, have decreased since about midday. Desires IOL/TOLAC at 39 weeks which has been scheduled.  and blood consents signed.   Good FM. Denies VB, LOF. Labor, ROM and bleeding precautions. F/U 1 week for IOL.

## 2020-09-25 ENCOUNTER — HOSPITAL ENCOUNTER (INPATIENT)
Facility: OTHER | Age: 30
LOS: 2 days | Discharge: HOME | End: 2020-09-27
Attending: OBSTETRICS & GYNECOLOGY | Admitting: OBSTETRICS & GYNECOLOGY
Payer: COMMERCIAL

## 2020-09-25 ENCOUNTER — ANESTHESIA EVENT (OUTPATIENT)
Dept: OBSTETRICS AND GYNECOLOGY | Facility: OTHER | Age: 30
End: 2020-09-25
Payer: COMMERCIAL

## 2020-09-25 ENCOUNTER — ANESTHESIA (OUTPATIENT)
Dept: OBSTETRICS AND GYNECOLOGY | Facility: OTHER | Age: 30
End: 2020-09-25
Payer: COMMERCIAL

## 2020-09-25 DIAGNOSIS — Z98.891 HISTORY OF CESAREAN DELIVERY: ICD-10-CM

## 2020-09-25 DIAGNOSIS — Z37.9 NORMAL LABOR: ICD-10-CM

## 2020-09-25 DIAGNOSIS — O34.219 VBAC (VAGINAL BIRTH AFTER CESAREAN): Primary | ICD-10-CM

## 2020-09-25 DIAGNOSIS — O09.893 SUPERVISION OF OTHER HIGH RISK PREGNANCIES, THIRD TRIMESTER: ICD-10-CM

## 2020-09-25 LAB
ABO + RH BLD: NORMAL
ALLENS TEST: ABNORMAL
ALLENS TEST: ABNORMAL
BASOPHILS # BLD AUTO: 0.04 K/UL (ref 0–0.2)
BASOPHILS NFR BLD: 0.4 % (ref 0–1.9)
BLD GP AB SCN CELLS X3 SERPL QL: NORMAL
BLOOD GROUP ANTIBODIES SERPL: NORMAL
DELSYS: ABNORMAL
DELSYS: ABNORMAL
DIFFERENTIAL METHOD: ABNORMAL
EOSINOPHIL # BLD AUTO: 0.1 K/UL (ref 0–0.5)
EOSINOPHIL NFR BLD: 0.6 % (ref 0–8)
ERYTHROCYTE [DISTWIDTH] IN BLOOD BY AUTOMATED COUNT: 12.7 % (ref 11.5–14.5)
HCO3 UR-SCNC: 17.3 MMOL/L (ref 24–28)
HCO3 UR-SCNC: 18.8 MMOL/L (ref 24–28)
HCT VFR BLD AUTO: 33.4 % (ref 37–48.5)
HGB BLD-MCNC: 10.9 G/DL (ref 12–16)
HIV 1+2 AB+HIV1 P24 AG SERPL QL IA: NEGATIVE
IMM GRANULOCYTES # BLD AUTO: 0.12 K/UL (ref 0–0.04)
IMM GRANULOCYTES NFR BLD AUTO: 1.2 % (ref 0–0.5)
LYMPHOCYTES # BLD AUTO: 2.6 K/UL (ref 1–4.8)
LYMPHOCYTES NFR BLD: 25.8 % (ref 18–48)
MCH RBC QN AUTO: 28.4 PG (ref 27–31)
MCHC RBC AUTO-ENTMCNC: 32.6 G/DL (ref 32–36)
MCV RBC AUTO: 87 FL (ref 82–98)
MONOCYTES # BLD AUTO: 0.8 K/UL (ref 0.3–1)
MONOCYTES NFR BLD: 7.5 % (ref 4–15)
NEUTROPHILS # BLD AUTO: 6.6 K/UL (ref 1.8–7.7)
NEUTROPHILS NFR BLD: 64.5 % (ref 38–73)
NRBC BLD-RTO: 0 /100 WBC
PCO2 BLDA: 47.3 MMHG (ref 35–45)
PCO2 BLDA: 53.6 MMHG (ref 35–45)
PH SMN: 7.15 [PH] (ref 7.35–7.45)
PH SMN: 7.17 [PH] (ref 7.35–7.45)
PLATELET # BLD AUTO: 254 K/UL (ref 150–350)
PMV BLD AUTO: 11.1 FL (ref 9.2–12.9)
PO2 BLDA: 17 MMHG (ref 80–100)
PO2 BLDA: 17 MMHG (ref 80–100)
POC BE: -10 MMOL/L
POC BE: -11 MMOL/L
POC SATURATED O2: 16 % (ref 95–100)
POC SATURATED O2: 16 % (ref 95–100)
RBC # BLD AUTO: 3.84 M/UL (ref 4–5.4)
SAMPLE: ABNORMAL
SAMPLE: ABNORMAL
SARS-COV-2 RDRP RESP QL NAA+PROBE: NEGATIVE
SITE: ABNORMAL
SITE: ABNORMAL
WBC # BLD AUTO: 10.2 K/UL (ref 3.9–12.7)

## 2020-09-25 PROCEDURE — 99285 EMERGENCY DEPT VISIT HI MDM: CPT | Mod: 25

## 2020-09-25 PROCEDURE — 25000003 PHARM REV CODE 250: Performed by: STUDENT IN AN ORGANIZED HEALTH CARE EDUCATION/TRAINING PROGRAM

## 2020-09-25 PROCEDURE — 11000001 HC ACUTE MED/SURG PRIVATE ROOM

## 2020-09-25 PROCEDURE — 63600175 PHARM REV CODE 636 W HCPCS: Performed by: STUDENT IN AN ORGANIZED HEALTH CARE EDUCATION/TRAINING PROGRAM

## 2020-09-25 PROCEDURE — 85025 COMPLETE CBC W/AUTO DIFF WBC: CPT

## 2020-09-25 PROCEDURE — U0002 COVID-19 LAB TEST NON-CDC: HCPCS

## 2020-09-25 PROCEDURE — 59025 FETAL NON-STRESS TEST: CPT

## 2020-09-25 PROCEDURE — 86870 RBC ANTIBODY IDENTIFICATION: CPT

## 2020-09-25 PROCEDURE — 59409 OBSTETRICAL CARE: CPT | Mod: QY,,, | Performed by: ANESTHESIOLOGY

## 2020-09-25 PROCEDURE — 86592 SYPHILIS TEST NON-TREP QUAL: CPT

## 2020-09-25 PROCEDURE — 99900035 HC TECH TIME PER 15 MIN (STAT)

## 2020-09-25 PROCEDURE — 63600175 PHARM REV CODE 636 W HCPCS: Performed by: OBSTETRICS & GYNECOLOGY

## 2020-09-25 PROCEDURE — C1751 CATH, INF, PER/CENT/MIDLINE: HCPCS | Performed by: ANESTHESIOLOGY

## 2020-09-25 PROCEDURE — 51702 INSERT TEMP BLADDER CATH: CPT

## 2020-09-25 PROCEDURE — 96365 THER/PROPH/DIAG IV INF INIT: CPT

## 2020-09-25 PROCEDURE — 96375 TX/PRO/DX INJ NEW DRUG ADDON: CPT

## 2020-09-25 PROCEDURE — 62326 NJX INTERLAMINAR LMBR/SAC: CPT | Performed by: STUDENT IN AN ORGANIZED HEALTH CARE EDUCATION/TRAINING PROGRAM

## 2020-09-25 PROCEDURE — 86703 HIV-1/HIV-2 1 RESULT ANTBDY: CPT | Mod: 91

## 2020-09-25 PROCEDURE — 86901 BLOOD TYPING SEROLOGIC RH(D): CPT

## 2020-09-25 PROCEDURE — 27200710 HC EPIDURAL INFUSION PUMP SET: Performed by: ANESTHESIOLOGY

## 2020-09-25 PROCEDURE — 25000003 PHARM REV CODE 250: Performed by: OBSTETRICS & GYNECOLOGY

## 2020-09-25 PROCEDURE — 59409 PRA ETRICAL CARE,VAG DELIV ONLY: ICD-10-PCS | Mod: QY,,, | Performed by: ANESTHESIOLOGY

## 2020-09-25 PROCEDURE — 72100002 HC LABOR CARE, 1ST 8 HOURS

## 2020-09-25 PROCEDURE — 86703 HIV-1/HIV-2 1 RESULT ANTBDY: CPT

## 2020-09-25 PROCEDURE — 82803 BLOOD GASES ANY COMBINATION: CPT

## 2020-09-25 PROCEDURE — 72200006 HC VAGINAL DELIVERY LEVEL III

## 2020-09-25 PROCEDURE — 59610 PR ROUT OB CARE,VAG DELIV,PREV C-SEC: ICD-10-PCS | Mod: ,,, | Performed by: OBSTETRICS & GYNECOLOGY

## 2020-09-25 RX ORDER — DIPHENHYDRAMINE HYDROCHLORIDE 50 MG/ML
25 INJECTION INTRAMUSCULAR; INTRAVENOUS EVERY 4 HOURS PRN
Status: DISCONTINUED | OUTPATIENT
Start: 2020-09-25 | End: 2020-09-27 | Stop reason: HOSPADM

## 2020-09-25 RX ORDER — SIMETHICONE 80 MG
1 TABLET,CHEWABLE ORAL 4 TIMES DAILY PRN
Status: DISCONTINUED | OUTPATIENT
Start: 2020-09-25 | End: 2020-09-25

## 2020-09-25 RX ORDER — BUTORPHANOL TARTRATE 1 MG/ML
1 INJECTION INTRAMUSCULAR; INTRAVENOUS ONCE
Status: COMPLETED | OUTPATIENT
Start: 2020-09-25 | End: 2020-09-25

## 2020-09-25 RX ORDER — DIPHENHYDRAMINE HCL 25 MG
25 CAPSULE ORAL EVERY 4 HOURS PRN
Status: DISCONTINUED | OUTPATIENT
Start: 2020-09-25 | End: 2020-09-27 | Stop reason: HOSPADM

## 2020-09-25 RX ORDER — FENTANYL/BUPIVACAINE/NS/PF 2MCG/ML-.1
PLASTIC BAG, INJECTION (ML) INJECTION CONTINUOUS
Status: DISCONTINUED | OUTPATIENT
Start: 2020-09-25 | End: 2020-09-27 | Stop reason: HOSPADM

## 2020-09-25 RX ORDER — BUPIVACAINE HYDROCHLORIDE 2.5 MG/ML
INJECTION, SOLUTION EPIDURAL; INFILTRATION; INTRACAUDAL
Status: DISPENSED
Start: 2020-09-25 | End: 2020-09-25

## 2020-09-25 RX ORDER — FENTANYL CITRATE 50 UG/ML
INJECTION, SOLUTION INTRAMUSCULAR; INTRAVENOUS
Status: DISCONTINUED | OUTPATIENT
Start: 2020-09-25 | End: 2020-09-25

## 2020-09-25 RX ORDER — MISOPROSTOL 200 UG/1
800 TABLET ORAL
Status: DISCONTINUED | OUTPATIENT
Start: 2020-09-25 | End: 2020-09-25

## 2020-09-25 RX ORDER — OXYTOCIN/RINGER'S LACTATE 30/500 ML
2 PLASTIC BAG, INJECTION (ML) INTRAVENOUS CONTINUOUS
Status: DISCONTINUED | OUTPATIENT
Start: 2020-09-25 | End: 2020-09-25

## 2020-09-25 RX ORDER — HYDROCORTISONE 25 MG/G
CREAM TOPICAL 3 TIMES DAILY PRN
Status: DISCONTINUED | OUTPATIENT
Start: 2020-09-25 | End: 2020-09-27 | Stop reason: HOSPADM

## 2020-09-25 RX ORDER — OXYTOCIN/RINGER'S LACTATE 30/500 ML
95 PLASTIC BAG, INJECTION (ML) INTRAVENOUS ONCE
Status: DISCONTINUED | OUTPATIENT
Start: 2020-09-25 | End: 2020-09-27 | Stop reason: HOSPADM

## 2020-09-25 RX ORDER — PRENATAL WITH FERROUS FUM AND FOLIC ACID 3080; 920; 120; 400; 22; 1.84; 3; 20; 10; 1; 12; 200; 27; 25; 2 [IU]/1; [IU]/1; MG/1; [IU]/1; MG/1; MG/1; MG/1; MG/1; MG/1; MG/1; UG/1; MG/1; MG/1; MG/1; MG/1
1 TABLET ORAL DAILY
Status: DISCONTINUED | OUTPATIENT
Start: 2020-09-26 | End: 2020-09-27 | Stop reason: HOSPADM

## 2020-09-25 RX ORDER — ONDANSETRON 8 MG/1
8 TABLET, ORALLY DISINTEGRATING ORAL EVERY 8 HOURS PRN
Status: DISCONTINUED | OUTPATIENT
Start: 2020-09-25 | End: 2020-09-27 | Stop reason: HOSPADM

## 2020-09-25 RX ORDER — DOCUSATE SODIUM 100 MG/1
200 CAPSULE, LIQUID FILLED ORAL 2 TIMES DAILY PRN
Status: DISCONTINUED | OUTPATIENT
Start: 2020-09-25 | End: 2020-09-27 | Stop reason: HOSPADM

## 2020-09-25 RX ORDER — OXYCODONE AND ACETAMINOPHEN 5; 325 MG/1; MG/1
1 TABLET ORAL EVERY 4 HOURS PRN
Status: DISCONTINUED | OUTPATIENT
Start: 2020-09-25 | End: 2020-09-25

## 2020-09-25 RX ORDER — OXYTOCIN/RINGER'S LACTATE 30/500 ML
334 PLASTIC BAG, INJECTION (ML) INTRAVENOUS ONCE
Status: DISCONTINUED | OUTPATIENT
Start: 2020-09-25 | End: 2020-09-25

## 2020-09-25 RX ORDER — OXYTOCIN/RINGER'S LACTATE 30/500 ML
95 PLASTIC BAG, INJECTION (ML) INTRAVENOUS ONCE
Status: DISCONTINUED | OUTPATIENT
Start: 2020-09-25 | End: 2020-09-25

## 2020-09-25 RX ORDER — IBUPROFEN 600 MG/1
600 TABLET ORAL EVERY 6 HOURS
Status: DISCONTINUED | OUTPATIENT
Start: 2020-09-25 | End: 2020-09-27 | Stop reason: HOSPADM

## 2020-09-25 RX ORDER — FENTANYL CITRATE 50 UG/ML
INJECTION, SOLUTION INTRAMUSCULAR; INTRAVENOUS
Status: COMPLETED
Start: 2020-09-25 | End: 2020-09-25

## 2020-09-25 RX ORDER — CALCIUM CARBONATE 200(500)MG
500 TABLET,CHEWABLE ORAL 3 TIMES DAILY PRN
Status: DISCONTINUED | OUTPATIENT
Start: 2020-09-25 | End: 2020-09-25

## 2020-09-25 RX ORDER — FAMOTIDINE 10 MG/ML
20 INJECTION INTRAVENOUS ONCE
Status: DISCONTINUED | OUTPATIENT
Start: 2020-09-25 | End: 2020-09-27 | Stop reason: HOSPADM

## 2020-09-25 RX ORDER — SODIUM CITRATE AND CITRIC ACID MONOHYDRATE 334; 500 MG/5ML; MG/5ML
30 SOLUTION ORAL ONCE
Status: DISCONTINUED | OUTPATIENT
Start: 2020-09-25 | End: 2020-09-27 | Stop reason: HOSPADM

## 2020-09-25 RX ORDER — SODIUM CHLORIDE, SODIUM LACTATE, POTASSIUM CHLORIDE, CALCIUM CHLORIDE 600; 310; 30; 20 MG/100ML; MG/100ML; MG/100ML; MG/100ML
INJECTION, SOLUTION INTRAVENOUS CONTINUOUS
Status: DISCONTINUED | OUTPATIENT
Start: 2020-09-25 | End: 2020-09-25

## 2020-09-25 RX ORDER — ONDANSETRON 8 MG/1
8 TABLET, ORALLY DISINTEGRATING ORAL EVERY 8 HOURS PRN
Status: DISCONTINUED | OUTPATIENT
Start: 2020-09-25 | End: 2020-09-25

## 2020-09-25 RX ORDER — HYDROCODONE BITARTRATE AND ACETAMINOPHEN 5; 325 MG/1; MG/1
1 TABLET ORAL EVERY 6 HOURS PRN
Status: DISCONTINUED | OUTPATIENT
Start: 2020-09-25 | End: 2020-09-27 | Stop reason: HOSPADM

## 2020-09-25 RX ORDER — HYDROCODONE BITARTRATE AND ACETAMINOPHEN 10; 325 MG/1; MG/1
1 TABLET ORAL EVERY 4 HOURS PRN
Status: DISCONTINUED | OUTPATIENT
Start: 2020-09-25 | End: 2020-09-27 | Stop reason: HOSPADM

## 2020-09-25 RX ORDER — ACETAMINOPHEN 325 MG/1
650 TABLET ORAL EVERY 6 HOURS PRN
Status: DISCONTINUED | OUTPATIENT
Start: 2020-09-25 | End: 2020-09-27 | Stop reason: HOSPADM

## 2020-09-25 RX ORDER — FENTANYL/BUPIVACAINE/NS/PF 2MCG/ML-.1
PLASTIC BAG, INJECTION (ML) INJECTION
Status: DISPENSED
Start: 2020-09-25 | End: 2020-09-25

## 2020-09-25 RX ORDER — METHYLERGONOVINE MALEATE 0.2 MG/ML
200 INJECTION INTRAVENOUS
Status: DISCONTINUED | OUTPATIENT
Start: 2020-09-25 | End: 2020-09-25

## 2020-09-25 RX ORDER — CARBOPROST TROMETHAMINE 250 UG/ML
250 INJECTION, SOLUTION INTRAMUSCULAR
Status: DISCONTINUED | OUTPATIENT
Start: 2020-09-25 | End: 2020-09-25

## 2020-09-25 RX ORDER — LIDOCAINE HYDROCHLORIDE AND EPINEPHRINE 15; 5 MG/ML; UG/ML
INJECTION, SOLUTION EPIDURAL
Status: DISCONTINUED | OUTPATIENT
Start: 2020-09-25 | End: 2020-09-25

## 2020-09-25 RX ORDER — SIMETHICONE 80 MG
1 TABLET,CHEWABLE ORAL EVERY 6 HOURS PRN
Status: DISCONTINUED | OUTPATIENT
Start: 2020-09-25 | End: 2020-09-27 | Stop reason: HOSPADM

## 2020-09-25 RX ORDER — SODIUM CHLORIDE 9 MG/ML
INJECTION, SOLUTION INTRAVENOUS
Status: DISCONTINUED | OUTPATIENT
Start: 2020-09-25 | End: 2020-09-25

## 2020-09-25 RX ADMIN — LIDOCAINE HYDROCHLORIDE,EPINEPHRINE BITARTRATE 3 ML: 15; .005 INJECTION, SOLUTION EPIDURAL; INFILTRATION; INTRACAUDAL; PERINEURAL at 10:09

## 2020-09-25 RX ADMIN — Medication 10 ML: at 10:09

## 2020-09-25 RX ADMIN — FENTANYL CITRATE 100 MCG: 50 INJECTION, SOLUTION INTRAMUSCULAR; INTRAVENOUS at 10:09

## 2020-09-25 RX ADMIN — ONDANSETRON 8 MG: 8 TABLET, ORALLY DISINTEGRATING ORAL at 11:09

## 2020-09-25 RX ADMIN — SODIUM CHLORIDE, SODIUM LACTATE, POTASSIUM CHLORIDE, AND CALCIUM CHLORIDE 1000 ML: .6; .31; .03; .02 INJECTION, SOLUTION INTRAVENOUS at 08:09

## 2020-09-25 RX ADMIN — IBUPROFEN 600 MG: 600 TABLET, FILM COATED ORAL at 06:09

## 2020-09-25 RX ADMIN — PROMETHAZINE HYDROCHLORIDE 12.5 MG: 25 INJECTION INTRAMUSCULAR; INTRAVENOUS at 08:09

## 2020-09-25 RX ADMIN — IBUPROFEN 600 MG: 600 TABLET, FILM COATED ORAL at 11:09

## 2020-09-25 RX ADMIN — SODIUM CHLORIDE, SODIUM LACTATE, POTASSIUM CHLORIDE, AND CALCIUM CHLORIDE: .6; .31; .03; .02 INJECTION, SOLUTION INTRAVENOUS at 11:09

## 2020-09-25 RX ADMIN — Medication 2 MILLI-UNITS/MIN: at 03:09

## 2020-09-25 RX ADMIN — BUTORPHANOL TARTRATE 1 MG: 1 INJECTION, SOLUTION INTRAMUSCULAR; INTRAVENOUS at 08:09

## 2020-09-25 RX ADMIN — DOCUSATE SODIUM 200 MG: 100 CAPSULE, LIQUID FILLED ORAL at 11:09

## 2020-09-25 RX ADMIN — Medication 2 MILLI-UNITS/MIN: at 05:09

## 2020-09-25 NOTE — ED PROVIDER NOTES
Encounter Date: 2020       History     Chief Complaint   Patient presents with    Contractions     HPI   Ximena Robbins is a 30 y.o. [id number] at 38w4d presents complaining of contractions.   This IUP is complicated by Rh-, h/o of c-sections (desires ), mild asthma, .  Patient reports contractions, denies vaginal bleeding, denies LOF.   Fetal Movement: normal.     Review of patient's allergies indicates:  No Known Allergies  Past Medical History:   Diagnosis Date    Anemia      Past Surgical History:   Procedure Laterality Date     SECTION N/A 2018    Procedure:  SECTION;  Surgeon: Ina Harper DO;  Location: [facility] L&D;  Service: OB/GYN;  Laterality: N/A;     Family History   Problem Relation Age of Onset    Cardiomyopathy Neg Hx     Arrhythmia Neg Hx     Congenital heart disease Neg Hx     Early death Neg Hx     Heart attacks under age 50 Neg Hx     Pacemaker/defibrilator Neg Hx      Social History     Tobacco Use    Smoking status: Never Smoker    Smokeless tobacco: Never Used   Substance Use Topics    Alcohol use: No    Drug use: No     Review of Systems   Constitutional: Negative for fever.   HENT: Negative for sore throat.    Respiratory: Negative for shortness of breath.    Cardiovascular: Negative for chest pain.   Gastrointestinal: Negative for nausea.   Genitourinary: Negative for dysuria.   Musculoskeletal: Negative for back pain.   Skin: Negative for rash.   Neurological: Negative for weakness.   Hematological: Does not bruise/bleed easily.       Physical Exam     Initial Vitals [20 0753]   BP Pulse Resp Temp SpO2   110/65 (!) 58 18 98 °F (36.7 °C) 100 %      MAP       --         Physical Exam    Vitals reviewed.  Constitutional: She appears well-developed and well-nourished. She is not diaphoretic. No distress.   HENT:   Head: Normocephalic and atraumatic.   Eyes: Conjunctivae and EOM are normal.   Neck: Normal range of motion. Neck supple.    Cardiovascular: Normal rate.   Pulmonary/Chest:   Normal work of breathing   Abdominal: Soft. There is no abdominal tenderness. There is no rebound and no guarding.   Gravid uterus   Musculoskeletal: Normal range of motion.   Neurological: She is alert and oriented to person, place, and time.   Skin: Skin is warm and dry.   Psychiatric: She has a normal mood and affect. Thought content normal.     OB LABOR EXAM:   Pre-Term Labor: No.   Membranes ruptured: No.   Method: Sterile vaginal exam per MD.   Vaginal Bleeding: none present.     Dilatation: 3.   Station: -2.   Effacement: 70%.             ED Course   Obtain Fetal nonstress test (NST)    Date/Time: 9/25/2020 8:12 AM  Performed by: Jeanette Murphy MD  Authorized by: Jeanette Murphy MD     Nonstress Test:     Variability:  6-25 BPM    Decelerations:  None    Accelerations:  15 bpm    Acoustic Stimulator: No      Baseline:  130    Contractions:  Regular    Contraction Frequency:  Q2-3  Biophysical Profile:     Nonstress Test Interpretation: reactive      Overall Impression:  Reassuring      Labs Reviewed   SARS-COV-2 RNA AMPLIFICATION, QUAL   CBC W/ AUTO DIFFERENTIAL   HIV 1 / 2 ANTIBODY   RAPID HIV   RPR   TYPE & SCREEN          Imaging Results    None          Medical Decision Making:   ED Management:  - VSS  - FHT: reactive and reassuring  - SVE: 3/70/-2 at 0815, will recheck in 2 hours >> repeat 4/80/-2. Admitted for labor management.  - s/p stadol, pheneren, IVF  Other:   I have discussed this case with another health care provider.                             Clinical Impression:     ICD-10-CM ICD-9-CM   1. Normal labor  O80 650    Z37.9                       Disposition:   Disposition: Admitted  Condition: Stable     ED Disposition Condition    Send to L&D                             Mandeep Escobedo MD  Resident  09/25/20 [id number]

## 2020-09-25 NOTE — ANESTHESIA PROCEDURE NOTES
Epidural    Patient location during procedure: OB   Reason for block: primary anesthetic   Diagnosis: iup   Start time: 9/25/2020 10:30 AM  Timeout: 9/25/2020 10:30 AM  End time: 9/25/2020 10:40 AM  Surgery related to: Vaginal Delivery    Staffing  Performing Provider: Gera Myrick MD  Authorizing Provider: Simona Flores MD        Preanesthetic Checklist  Completed: patient identified, site marked, surgical consent, pre-op evaluation, timeout performed, IV checked, risks and benefits discussed, monitors and equipment checked, anesthesia consent given, hand hygiene performed and patient being monitored  Preparation  Patient position: sitting  Prep: ChloraPrep  Patient monitoring: Pulse Ox  Epidural  Skin Anesthetic: lidocaine 1%  Skin Wheal: 3 mL  Administration type: continuous  Approach: midline  Interspace: L3-4    Injection technique: PRADEEP saline  Needle and Epidural Catheter  Needle type: Tuohy   Needle gauge: 17  Needle length: 3.5 inches  Needle insertion depth: 5.5 cm  Catheter type: springwound  Catheter size: 19 G  Catheter at skin depth: 10 cm  Test dose: 3 mL of lidocaine 1.5% with Epi 1-to-200,000  Additional Documentation: incremental injection, negative aspiration for heme and CSF, no paresthesia on injection, no signs/symptoms of IV or SA injection, no significant pain on injection and no significant complaints from patient  Needle localization: anatomical landmarks  Medications:  Volume per aspiration: 5 mL  Time between aspirations: 5 minutes  Assessment  Ease of block: easy  Patient's tolerance of the procedure: comfortable throughout block and no complaintsNo inadvertent dural puncture with Tuohy.  Dural puncture performed with spinal needle.

## 2020-09-25 NOTE — PROGRESS NOTES
09/25/20 1632   [facility] Note - Strip   Strip Reviewed by [facility] Nurse? Yes   [facility] Note - Communication   [place] Nurse Communicated with Bedside Nurse Regarding: Fetal Status   [facility] Note - Notification   Nurse Notified? Yes  (Recurrent decels. Rhonda JULIEN RN in room repositioning pt.)   Name of Nurse Rhonda JULIEN

## 2020-09-25 NOTE — L&D DELIVERY NOTE
[facility]  Vaginal Delivery   Operative Note    SUMMARY     After approximately 10 minutes of maternal pushing, decision made to proceed with operative delivery. Verbal consent was obtained. Patient was counseled on r/b/a. Charge RN, NICU, and anesthesia made aware. OB staff present. Decision made to attempt operative delivery labor room.    EFW 2400g. Patient's pelvis though to be adequate and appropriate for operative delivery.     Indication for operative delivery: non reassuring fetal status secondary to late decelerations to 70s during pushing.    Fetal position was confirmed is OA, vacuum applied to fetal head. Position and correct application again reconfirmed. 5 number of pulls with 4 of contractions were performed. 1 Pop off with first pull.  with fetal head descent. Once fetal head at perineum,  vacuum removed. Infant delivered OA over intact perineum.    Nuchal x1 reduced after delivery.   Male infant also tolerated the delivery well and was placed on mothers abdomen for skin to skin and bulb suctioning performed.  Cord clamped and cut.  Umbilical arterial gas and venous blood obtained.  L labial laceration noted and repaired in the usual fashion with 3-0 vicryl.  Bilateral periurethral and posterior vaginal abrasion noted and reapproximated with running unlocked sutures of 3-0 vicryl.  Placenta delivered spontaneously and IV pitocin given.  Uterine tone noted. No cervical lacerations.  Patient tolerated delivery well.  EBL 150cc  Staff present for entire procedure.  S/L/N counts correct x2.        Anel Santiago MD   PGY-1, OB-GYN        Indications: Normal labor  Pregnancy complicated by:   Patient Active Problem List   Diagnosis    Threatened miscarriage    Normal pregnancy in second trimester    Consanguinity    History of fetal abnormality in previous pregnancy, currently pregnant    History of  delivery    Rh negative state in antepartum period    Language  barrier affecting health care    Mild intermittent asthma without complication    Family history of congenital heart disease, first child    Supervision of other high risk pregnancies, third trimester     (vaginal birth after )    Normal labor     Admitting GA: 38w4d    Delivery Information for Urbano Robbins    Birth information:  YOB: 2020   Time of birth: 5:41 PM   Sex: male   Head Delivery Date/Time: 2020  5:40 PM   Delivery type: Vaginal, Vacuum (Extractor)   Gestational Age: 38w4d    Delivery Providers    Delivering clinician: Lisandra Kaufman MD   Provider Role    Anel Santiago MD Resident    Rhonda Valdovinos, RN Registered Nurse    Viki Harden, RN Registered Nurse    Erum Hull, RN Registered Nurse    Rhonda Penaloza, [facility] Surgical Tech            Measurements    Weight: 2400 g  Length: 50.2 cm  Head circumference: 33.7 cm  Chest circumference: 29.2 cm         Apgars    Living status: Living  Apgars:  1 min.:  5 min.:  10 min.:  15 min.:  20 min.:    Skin color:  0  1       Heart rate:  2  2       Reflex irritability:  2  2       Muscle tone:  2  2       Respiratory effort:  2  2       Total:  8  9       Apgars assigned by: NICU         Operative Delivery    Forceps attempted?: No  Vacuum extractor attempted?: Yes  Vacuum indications: Fetal Heart Rate or Rhythm Abnormality  Vacuum type: Kiwi Omni Cup (mushroom)  Vacuum application location: Mid, Outlet  First attempt time vacuum applied: 2020 17:37:00  First attempt time vacuum removed: 2020 17:38:00  Second attempt time vacuum applied: 2020 17:39:00  Second attempt time vacuum removed: 2020 17:41:00  Number of pop offs: 1  Number of pulls with vacuum: 5  Total vacuum application time: 3 minutes  Vacuum applied by: DR KAUFMAN  Failed vacuum delivery?: No         Shoulder Dystocia    Shoulder dystocia present?: No           Presentation    Presentation: Vertex  Position: Right  Occiput Anterior           Interventions/Resuscitation    Method: NICU Attended       Cord    Vessels: 3 vessels  Complications: Nuchal  Nuchal Intervention: reduced  Nuchal Cord Description: tight nuchal cord  Number of Loops: 1  Delayed Cord Clamping?: No  Cord Clamped Date/Time: 2020  5:41 PM  Cord Blood Disposition: Sent with Baby  Gases Sent?: Yes  Stem Cell Collection (by MD): No       Placenta    Placenta delivery date/time: 2020 1743  Placenta removal: Spontaneous  Placenta appearance: Intact  Placenta disposition: discarded           Labor Events:       labor: No     Labor Onset Date/Time:         Dilation Complete Date/Time: 2020 17:25     Start Pushing Date/Time: 2020 17:25       Start Pushing Date/Time: 2020 17:25     Rupture Date/Time: 20  1630         Rupture type:          Fluid Amount:       Fluid Color: Meconium Moderate      Fluid Odor:       Membrane Status: ARM (Artificial Rupture)               steroids: None     Antibiotics given for GBS: No     Induction:       Indications for induction:        Augmentation: amniotomy;oxytocin     Indications for augmentation: Fetal Heart Rate or Rhythm Abnormality;Ineffective Contraction Pattern     Labor complications: None     Additional complications:          Cervical ripening:                     Delivery:      Episiotomy: None     Indication for Episiotomy:       Perineal Lacerations: None Repaired:      Periurethral Laceration: bilateral Repaired: Yes   Labial Laceration: left Repaired: Yes   Sulcus Laceration:   Repaired:     Vaginal Laceration:   Repaired:     Cervical Laceration:   Repaired:     Repair suture:       Repair # of packets: 2     Last Value - EBL - Nursing (mL):       Sum - EBL - Nursing (mL): 0     Last Value - EBL - Anesthesia (mL):      Calculated QBL (mL): 150      Vaginal Sweep Performed: Yes     Surgicount Correct: Yes       Other providers:       Anesthesia    Method: Epidural           Details (if applicable):  Trial of Labor      Categorization:      Priority:     Indications for :     Incision Type:       Additional  information:  Forceps:    Vacuum:    Breech:    Observed anomalies    Other (Comments): NICU attended delivery for meconium fluid.

## 2020-09-25 NOTE — PLAN OF CARE
20 1040   OB SCREEN   Source of Information health record   Received Prenatal Care Yes   Any indications/suspicions for None   Is this a teen pregnancy No   Indication for adoption/Safe Haven No   Indication for DME/post-acute needs No   HIV (+) No   Any congenital  disorders No   Fetal demise/ death No       This patient has been screened for Social Work discharge planning needs. Based on  documentation in medical record , no discharge planning needs are anticipated at this time. Should any discharge planning needs arise, please consult . For urgent needs/consults, contact the  listed below at the number provided.     Carlene Alva LCSW    [facility]  [email or web address]    (phone) [phone] or  Tjq. [phone]  (fax) [phone]

## 2020-09-25 NOTE — ANESTHESIA PREPROCEDURE EVALUATION
2020  Ximena Robbins is a 30 y.o., female  female with IUP at 38w4d weeks gestation who presents with contraction.    This IUP is complicated by TOLAC, Rh-, h/o of heart defects in prior child (normal anatomy this pregnancy), and mild asthma.      OB History        2    Para   1    Term           1    AB        Living   0       SAB        TAB        Ectopic        Multiple   0    Live Births   0                   Patient Active Problem List   Diagnosis    Threatened miscarriage    Normal pregnancy in second trimester    Consanguinity    History of fetal abnormality in previous pregnancy, currently pregnant    History of  delivery    Rh negative state in antepartum period    Language barrier affecting health care    Mild intermittent asthma without complication    Family history of congenital heart disease, first child    Normal labor       Review of patient's allergies indicates:   Allergen Reactions    Nitrofurantoin Rash        No current facility-administered medications on file prior to encounter.      Current Outpatient Medications on File Prior to Encounter   Medication Sig Dispense Refill    prenatal vit calc,iron,folic (PRENATAL VITAMIN ORAL) Take by mouth.         Past Surgical History:   Procedure Laterality Date     SECTION N/A 2018    Procedure:  SECTION;  Surgeon: Ina Harper DO;  Location: [facility] L&D;  Service: OB/GYN;  Laterality: N/A;       Social History     Socioeconomic History    Marital status:      Spouse name: Not on file    Number of children: Not on file    Years of education: Not on file    Highest education level: Not on file   Occupational History    Not on file   Social Needs    Financial resource strain: Not on file    Food insecurity     Worry: Not on file     Inability: Not on file     Transportation needs     Medical: Not on file     Non-medical: Not on file   Tobacco Use    Smoking status: Never Smoker    Smokeless tobacco: Never Used   Substance and Sexual Activity    Alcohol use: No    Drug use: No    Sexual activity: Not on file   Lifestyle    Physical activity     Days per week: Not on file     Minutes per session: Not on file    Stress: Not on file   Relationships    Social connections     Talks on phone: Not on file     Gets together: Not on file     Attends Catholic service: Not on file     Active member of club or organization: Not on file     Attends meetings of clubs or organizations: Not on file     Relationship status: Not on file   Other Topics Concern    Not on file   Social History Narrative    Not on file         Vital Signs Range (Last 24H):  Temp:  [36.7 °C (98 °F)]   Pulse:  [54-63]   Resp:  [18]   BP: (110)/(65)   SpO2:  [99 %-100 %]       CBC: No results for input(s): WBC, RBC, HGB, HCT, PLT, MCV, MCH, MCHC in the last 72 hours.    CMP: No results for input(s): NA, K, CL, CO2, BUN, CREATININE, GLU, MG, PHOS, CALCIUM, ALBUMIN, PROT, ALKPHOS, ALT, AST, BILITOT in the last 72 hours.    INR  No results for input(s): PT, INR, PROTIME, APTT in the last 72 hours.        Diagnostic Studies:      EKD Echo:          Anesthesia Evaluation    I have reviewed the Patient Summary Reports.    I have reviewed the Nursing Notes. I have reviewed the NPO Status.   I have reviewed the Medications.     Review of Systems  Anesthesia Hx:  No problems with previous Anesthesia  History of prior surgery of interest to airway management or planning: Denies Family Hx of Anesthesia complications.   Denies Personal Hx of Anesthesia complications.   Social:  No Alcohol Use, Non-Smoker    Hematology/Oncology:  Hematology Normal   Oncology Normal     EENT/Dental:EENT/Dental Normal   Cardiovascular:  Cardiovascular Normal     Pulmonary:   Asthma asymptomatic    Renal/:  Renal/ Normal      Hepatic/GI:  Hepatic/GI Normal    Musculoskeletal:  Musculoskeletal Normal    Neurological:  Neurology Normal    Endocrine:  Endocrine Normal    Dermatological:  Skin Normal    Psych:  Psychiatric Normal           Physical Exam  General:  Well nourished    Airway/Jaw/Neck:  Airway Findings: Mouth Opening: Normal Tongue: Normal  General Airway Assessment: Adult  Mallampati: III  Improves to II with phonation.      Dental:  Dental Findings: In tact   Chest/Lungs:  Chest/Lungs Clear    Heart/Vascular:  Heart Findings: Normal       Mental Status:  Mental Status Findings:  Cooperative, Alert and Oriented         Anesthesia Plan  Type of Anesthesia, risks & benefits discussed:  Anesthesia Type:  general, epidural, CSE, spinal  Patient's Preference:   Intra-op Monitoring Plan: standard ASA monitors  Intra-op Monitoring Plan Comments:   Post Op Pain Control Plan: multimodal analgesia  Post Op Pain Control Plan Comments:   Induction:   IV  Beta Blocker:  Patient is not currently on a Beta-Blocker (No further documentation required).       Informed Consent: Patient understands risks and agrees with Anesthesia plan.  Questions answered. Anesthesia consent signed with patient.  ASA Score: 2     Day of Surgery Review of History & Physical:    H&P update referred to the surgeon.         Ready For Surgery From Anesthesia Perspective.

## 2020-09-25 NOTE — H&P
HISTORY AND PHYSICAL                                                OBSTETRICS          Subjective:       Ximena Robbins is a 30 y.o.  female with IUP at 38w4d weeks gestation who presents with contraction.    Patient reports contractions, denies vaginal bleeding, denies LOF.   Fetal Movement: normal.    This IUP is complicated by TOLAC, Rh-, h/o of heart defects in prior child (normal anatomy this pregnancy), and mild asthma.    Review of Systems   Constitutional: Negative for chills and fever.   Respiratory: Negative for cough and shortness of breath.    Cardiovascular: Negative for chest pain and palpitations.   Gastrointestinal: Negative for abdominal pain, constipation, diarrhea, nausea and vomiting.   Genitourinary: Negative for dysuria, urgency, vaginal bleeding, vaginal discharge and vaginal pain.   Musculoskeletal: Negative for arthralgias.   Integumentary:  Negative for rash.   Neurological: Negative for syncope and headaches.       PMHx:   Past Medical History:   Diagnosis Date    Anemia        PSHx:   Past Surgical History:   Procedure Laterality Date     SECTION N/A 2018    Procedure:  SECTION;  Surgeon: Ina Harper DO;  Location: [facility], [place], operated by [facility] L&D;  Service: OB/GYN;  Laterality: N/A;       All: Review of patient's allergies indicates:  No Known Allergies    Meds: (Not in a hospital admission)      SH:   Social History     Socioeconomic History    Marital status:      Spouse name: Not on file    Number of children: Not on file    Years of education: Not on file    Highest education level: Not on file   Occupational History    Not on file   Social Needs    Financial resource strain: Not on file    Food insecurity     Worry: Not on file     Inability: Not on file    Transportation needs     Medical: Not on file     Non-medical: Not on file   Tobacco Use    Smoking status: Never Smoker    Smokeless tobacco: Never Used   Substance and Sexual Activity     "Alcohol use: No    Drug use: No    Sexual activity: Not on file   Lifestyle    Physical activity     Days per week: Not on file     Minutes per session: Not on file    Stress: Not on file   Relationships    Social connections     Talks on phone: Not on file     Gets together: Not on file     Attends Anglican service: Not on file     Active member of club or organization: Not on file     Attends meetings of clubs or organizations: Not on file     Relationship status: Not on file   Other Topics Concern    Not on file   Social History Narrative    Not on file       FH:   Family History   Problem Relation Age of Onset    Cardiomyopathy Neg Hx     Arrhythmia Neg Hx     Congenital heart disease Neg Hx     Early death Neg Hx     Heart attacks under age 50 Neg Hx     Pacemaker/defibrilator Neg Hx        OBHx:   OB History    Para Term  AB Living   2 1 0 1 0 0   SAB TAB Ectopic Multiple Live Births   0 0 0 0 0      # Outcome Date GA Lbr Axel/2nd Weight Sex Delivery Anes PTL Lv   2 Current            1  18 29w5d   M CS-LTranv Spinal  FD      Name: ARIK KAYE      Apgar1: 2  Apgar5: 7       Objective:       /65   Pulse (!) 54   Temp 98 °F (36.7 °C)   Resp 18   Ht 5' 3" (1.6 m)   Wt 56.2 kg (124 lb)   LMP 2019   SpO2 99%   BMI 21.97 kg/m²     Vitals:    20 0753 20 0800 20 0830 20 0900   BP: 110/65      Pulse: (!) 58 (!) 58 63 (!) 54   Resp: 18      Temp: 98 °F (36.7 °C)      SpO2: 100% 99% 100% 99%   Weight: 56.2 kg (124 lb)      Height: 5' 3" (1.6 m)          General: NAD, alert, oriented, cooperative  HEENT: NCAT, EOM grossly intact  Lungs: Normal WOB  Heart: regular rate  Abdomen: gravid, soft, nondistended, nontender, no rebound or guarding  Extremities: no edema    FHT: 130 bpm, moderate BTBV, +accels, -decels; Cat 1 (reassuring)  [place]: q 2-3mins  Presentation: cephalic by ultrasound    Cervix: 4/80/-2    EFW by Leopold's: " 6.5#    Recent Growth Scan: 1933g (25%) at 32 wga    Lab Review  Blood Type O NEG  GBBS: negative  Rubella: Immune  RPR: NR  HIV: negative  HepB: negative       Assessment:       38w4d weeks gestation with TOLAC    Active Hospital Problems    Diagnosis  POA    *Normal labor [O80, Z37.9]  Not Applicable    Rh negative state in antepartum period [O26.899, Z67.91]  Yes    History of  delivery [Z98.891]  Not Applicable    Mild intermittent asthma without complication [J45.20]  Yes      Resolved Hospital Problems   No resolved problems to display.          Plan:     1. TOLAC   Risks, benefits, alternatives and possible complications have been discussed in detail with the patient.   - Consents signed and to chart  - Admit to Labor and Delivery unit  - Epidural per Anesthesia  - Draw CBC, T&S  - Notify Staff  - Recheck in 2 hrs or PRN  - Post-Partum Hemorrhage risk - medium    2. Mild Asthma  - albuterol PRN    3. Rh-  - will need postpartum rhogam workup      Jeanette Murphy MD/MPH  OB/GYN PGY1

## 2020-09-25 NOTE — PROGRESS NOTES
"LABOR NOTE    S:  Complaints: No.  Epidural working:  yes    MD to bedside for cervical exam due to patient discomfort     O: BP (!) 92/53   Pulse 78   Temp 98.2 °F (36.8 °C)   Resp 18   Ht 5' 3" (1.6 m)   Wt 56.2 kg (124 lb)   LMP 2019   SpO2 100%   Breastfeeding No   BMI 21.97 kg/m²       FHT: 130 Cat 1 (reassuring)  CTX: q 2-3 minutes  SVE: /100/0      ASSESSMENT:   30 y.o.  at 38w4d, normal labor     FHT reassuring    Active Hospital Problems    Diagnosis  POA    *Normal labor [O80, Z37.9]  Not Applicable    Supervision of other high risk pregnancies, third trimester [O09.893]  Not Applicable    Rh negative state in antepartum period [O26.899, Z67.91]  Yes    History of  delivery [Z98.891]  Not Applicable    Mild intermittent asthma without complication [J45.20]  Yes      Resolved Hospital Problems   No resolved problems to display.         PLAN:  Peanut ball   Continue Close Maternal/Fetal Monitoring  Restart pitocin   Recheck 2 hours or PRN    Anel Santiago MD   PGY-1, OB-GYN        "

## 2020-09-25 NOTE — PROGRESS NOTES
Denies complaints  Vitals:    09/25/20 1241   BP: 100/63   Pulse: 83   Resp:    Temp:      FHT: cat 1  TOCO: ctx every 1-2 min  SVE:  5/70/-2    A/p: tracing reassuring  Continue expectant management.

## 2020-09-26 LAB
ABO + RH BLD: NORMAL
BASOPHILS # BLD AUTO: 0.05 K/UL (ref 0–0.2)
BASOPHILS NFR BLD: 0.4 % (ref 0–1.9)
BLD GP AB SCN CELLS X3 SERPL QL: NORMAL
DIFFERENTIAL METHOD: ABNORMAL
EOSINOPHIL # BLD AUTO: 0 K/UL (ref 0–0.5)
EOSINOPHIL NFR BLD: 0.3 % (ref 0–8)
ERYTHROCYTE [DISTWIDTH] IN BLOOD BY AUTOMATED COUNT: 12.9 % (ref 11.5–14.5)
FETAL CELL SCN BLD QL ROSETTE: NORMAL
HCT VFR BLD AUTO: 34.7 % (ref 37–48.5)
HGB BLD-MCNC: 11.1 G/DL (ref 12–16)
HIV1+2 IGG SERPL QL IA.RAPID: NORMAL
IMM GRANULOCYTES # BLD AUTO: 0.11 K/UL (ref 0–0.04)
IMM GRANULOCYTES NFR BLD AUTO: 0.9 % (ref 0–0.5)
LYMPHOCYTES # BLD AUTO: 1.7 K/UL (ref 1–4.8)
LYMPHOCYTES NFR BLD: 12.9 % (ref 18–48)
MCH RBC QN AUTO: 27.9 PG (ref 27–31)
MCHC RBC AUTO-ENTMCNC: 32 G/DL (ref 32–36)
MCV RBC AUTO: 87 FL (ref 82–98)
MONOCYTES # BLD AUTO: 0.7 K/UL (ref 0.3–1)
MONOCYTES NFR BLD: 5.4 % (ref 4–15)
NEUTROPHILS # BLD AUTO: 10.3 K/UL (ref 1.8–7.7)
NEUTROPHILS NFR BLD: 80.1 % (ref 38–73)
NRBC BLD-RTO: 0 /100 WBC
PLATELET # BLD AUTO: 268 K/UL (ref 150–350)
PMV BLD AUTO: 10.4 FL (ref 9.2–12.9)
RBC # BLD AUTO: 3.98 M/UL (ref 4–5.4)
WBC # BLD AUTO: 12.91 K/UL (ref 3.9–12.7)

## 2020-09-26 PROCEDURE — 86901 BLOOD TYPING SEROLOGIC RH(D): CPT

## 2020-09-26 PROCEDURE — 85025 COMPLETE CBC W/AUTO DIFF WBC: CPT

## 2020-09-26 PROCEDURE — 85461 HEMOGLOBIN FETAL: CPT

## 2020-09-26 PROCEDURE — 25000003 PHARM REV CODE 250: Performed by: STUDENT IN AN ORGANIZED HEALTH CARE EDUCATION/TRAINING PROGRAM

## 2020-09-26 PROCEDURE — 36415 COLL VENOUS BLD VENIPUNCTURE: CPT

## 2020-09-26 PROCEDURE — 11000001 HC ACUTE MED/SURG PRIVATE ROOM

## 2020-09-26 PROCEDURE — 99233 PR SUBSEQUENT HOSPITAL CARE,LEVL III: ICD-10-PCS | Mod: ,,, | Performed by: OBSTETRICS & GYNECOLOGY

## 2020-09-26 PROCEDURE — 99233 SBSQ HOSP IP/OBS HIGH 50: CPT | Mod: ,,, | Performed by: OBSTETRICS & GYNECOLOGY

## 2020-09-26 RX ORDER — HYDROCODONE BITARTRATE AND ACETAMINOPHEN 5; 325 MG/1; MG/1
1 TABLET ORAL EVERY 6 HOURS PRN
Qty: 15 TABLET | Refills: 0 | Status: SHIPPED | OUTPATIENT
Start: 2020-09-26

## 2020-09-26 RX ORDER — DOCUSATE SODIUM 100 MG/1
200 CAPSULE, LIQUID FILLED ORAL 2 TIMES DAILY PRN
Qty: 60 CAPSULE | Refills: 2 | Status: SHIPPED | OUTPATIENT
Start: 2020-09-26

## 2020-09-26 RX ORDER — IBUPROFEN 600 MG/1
600 TABLET ORAL 3 TIMES DAILY
Qty: 60 TABLET | Refills: 2 | Status: SHIPPED | OUTPATIENT
Start: 2020-09-26

## 2020-09-26 RX ADMIN — IBUPROFEN 600 MG: 600 TABLET, FILM COATED ORAL at 08:09

## 2020-09-26 RX ADMIN — HYDROCODONE BITARTRATE AND ACETAMINOPHEN 1 TABLET: 10; 325 TABLET ORAL at 05:09

## 2020-09-26 RX ADMIN — IBUPROFEN 600 MG: 600 TABLET, FILM COATED ORAL at 01:09

## 2020-09-26 RX ADMIN — HYDROCODONE BITARTRATE AND ACETAMINOPHEN 1 TABLET: 10; 325 TABLET ORAL at 02:09

## 2020-09-26 RX ADMIN — DOCUSATE SODIUM 200 MG: 100 CAPSULE, LIQUID FILLED ORAL at 08:09

## 2020-09-26 RX ADMIN — IBUPROFEN 600 MG: 600 TABLET, FILM COATED ORAL at 05:09

## 2020-09-26 NOTE — LACTATION NOTE
09/26/20 1635   Maternal Assessment   Breast Shape Left:;round   Breast Density Left:;soft   Areola Left:;elastic   Nipples Left:;everted   Maternal Infant Feeding   Maternal Emotional State assist needed   Infant Positioning cross-cradle   Latch Assistance yes  (no latch achieved)    offered to use . Pt declined and preferred to contact  by phone. Pt shared she was interested in breastfeeding.  printed out basic breastfeeding in Latvian. Pt wanted to attempt to latch baby and shared that baby had a formula bottle prior to LC entered room.  educated Pt on baby's stomach size and shared baby may not be interested in eating. Pt continued to express interest in latching baby; however, Pt uncomfortable putting baby skin to skin. Pt preferred to keep baby swaddled.  discussed state modulation and attempted to encourage baby to open wide. Baby pursed lips.  reviewed steps to obtain latch. Pt acknowledged understanding. Pt aware to attempt to latch baby no longer than 10 minutes; FOB to put baby skin to skin while Pt hand expresses; FOB to top baby off with formula until content. All questions answered.

## 2020-09-26 NOTE — PROGRESS NOTES
POSTPARTUM PROGRESS NOTE     Ximena Robbins is a 30 y.o. female PPD #1 status post vacuum assisted  at 38w4d in a pregnancy complicated by TOLAC, Rh-, h/o of heart defects in prior child (normal anatomy this pregnancy), and mild asthma.. Patient is doing well this morning. She denies nausea, vomiting, fever or chills.  Patient reports mild abdominal pain that is adequately relieved by oral pain medications. Lochia is mild to moderate  and stable. Patient is voiding without difficulty and ambulating with no difficulty. She has passed flatus.  Patient does plan to breast feed. Per primary OB for contraception. Circumcision deferred due to blood glucose control in baby.      Objective:       Temp:  [97 °F (36.1 °C)-99 °F (37.2 °C)] 98.2 °F (36.8 °C)  Pulse:  [] 62  Resp:  [18] 18  SpO2:  [97 %-100 %] 100 %  BP: ()/(48-71) 102/55    General:   alert, appears stated age and cooperative   Lungs:   Non-labored respirations    Heart:   regular rate and rhythm   Abdomen:  Soft, nondistended    Uterus:  firm located at the umblicus.        Extremities: no pedal edema noted     Lab Review  No results found for this or any previous visit (from the past 4 hour(s)).    I/O    Intake/Output Summary (Last 24 hours) at 2020 0326  Last data filed at 2020 1800  Gross per 24 hour   Intake 1055.1 ml   Output 475 ml   Net 580.1 ml        Assessment:     Patient Active Problem List   Diagnosis    Threatened miscarriage    Normal pregnancy in second trimester    Consanguinity    History of fetal abnormality in previous pregnancy, currently pregnant    History of  delivery    Rh negative state in antepartum period    Language barrier affecting health care    Mild intermittent asthma without complication    Family history of congenital heart disease, first child    Supervision of other high risk pregnancies, third trimester     (vaginal birth after )    Normal labor        Plan:   1.  Postpartum care:  - Patient doing well. Continue routine management and advances.  - Continue PO pain meds. Pain well controlled.  - Heme: H/h 11/33.4 >>>   Recent Labs   Lab 09/25/20  0827   WBC 10.20   RBC 3.84*   HGB 10.9*   HCT 33.4*      MCV 87   MCH 28.4   MCHC 32.6     - Encourage ambulation  - Circumcision deferred   - Contraception to be discussed at 6 week pp visit  - Lactation consult PRN  - Rh -    2. Mild Asthma  - albuterol PRN     3. Rh-  -  postpartum rhogam workup         Dispo: As patient meets milestones, will plan to discharge.    Anel Santiago MD   PGY-1, OB-GYN

## 2020-09-26 NOTE — PLAN OF CARE
Discussed with Pt plan of care for the night to include adequate pain management. Talked with Pt about when to call the nurse regarding pain medication and bleeding.

## 2020-09-27 VITALS
BODY MASS INDEX: 21.97 KG/M2 | TEMPERATURE: 98 F | WEIGHT: 124 LBS | HEART RATE: 56 BPM | RESPIRATION RATE: 18 BRPM | OXYGEN SATURATION: 98 % | SYSTOLIC BLOOD PRESSURE: 100 MMHG | HEIGHT: 63 IN | DIASTOLIC BLOOD PRESSURE: 54 MMHG

## 2020-09-27 PROCEDURE — 99233 SBSQ HOSP IP/OBS HIGH 50: CPT | Mod: ,,, | Performed by: OBSTETRICS & GYNECOLOGY

## 2020-09-27 PROCEDURE — 25000003 PHARM REV CODE 250: Performed by: STUDENT IN AN ORGANIZED HEALTH CARE EDUCATION/TRAINING PROGRAM

## 2020-09-27 PROCEDURE — 99233 PR SUBSEQUENT HOSPITAL CARE,LEVL III: ICD-10-PCS | Mod: ,,, | Performed by: OBSTETRICS & GYNECOLOGY

## 2020-09-27 RX ADMIN — PRENATAL VIT W/ FE FUMARATE-FA TAB 27-0.8 MG 1 TABLET: 27-0.8 TAB at 09:09

## 2020-09-27 RX ADMIN — IBUPROFEN 600 MG: 600 TABLET, FILM COATED ORAL at 09:09

## 2020-09-27 RX ADMIN — IBUPROFEN 600 MG: 600 TABLET, FILM COATED ORAL at 03:09

## 2020-09-27 RX ADMIN — HYDROCODONE BITARTRATE AND ACETAMINOPHEN 1 TABLET: 10; 325 TABLET ORAL at 09:09

## 2020-09-27 NOTE — PROGRESS NOTES
POSTPARTUM PROGRESS NOTE     Ximena Robbins is a 30 y.o. female PPD #2 status post vacuum assisted  at 38w4d in a pregnancy complicated by TOLAC, Rh-, h/o of heart defects in prior child (normal anatomy this pregnancy), and mild asthma.. Patient is doing well this morning. She denies nausea, vomiting, fever or chills.  Patient reports mild abdominal pain that is adequately relieved by oral pain medications. Lochia is mild to moderate  and stable. Patient is voiding without difficulty and ambulating with no difficulty. She has passed flatus.  Patient does plan to breast feed. Per primary OB for contraception. Circumcision deferred due to blood glucose control in baby.      Objective:       Temp:  [97.9 °F (36.6 °C)-98.7 °F (37.1 °C)] 98.7 °F (37.1 °C)  Pulse:  [60-65] 63  Resp:  [16-18] 16  SpO2:  [98 %-100 %] 100 %  BP: ()/(50-67) 105/67    General:   alert, appears stated age and cooperative   Lungs:   Non-labored respirations    Heart:   regular rate and rhythm   Abdomen:  Soft, nondistended    Uterus:  firm located at the umblicus.    Extremities: no pedal edema noted     Lab Review  No results found for this or any previous visit (from the past 4 hour(s)).    I/O  No intake or output data in the 24 hours ending 20 0604     Assessment:     Patient Active Problem List   Diagnosis    Threatened miscarriage    Normal pregnancy in second trimester    Consanguinity    History of fetal abnormality in previous pregnancy, currently pregnant    History of  delivery    Rh negative state in antepartum period    Language barrier affecting health care    Mild intermittent asthma without complication    Family history of congenital heart disease, first child    Supervision of other high risk pregnancies, third trimester     (vaginal birth after )    Normal labor        Plan:   1. Postpartum care:  - Patient doing well. Continue routine management and advances.  - Continue PO  pain meds. Pain well controlled.  - Heme: H/h 11/33.4  - Encourage ambulation  - Circumcision deferred   - Contraception to be discussed at 6 week pp visit  - Lactation consult PRN  - Rh -    2. Mild Asthma  - albuterol PRN     3. Rh-  -  Baby Rh-         Dispo: As patient meets milestones, will plan to discharge.    Katherine Boecking, MD   PGY-1, OB-GYN

## 2020-09-27 NOTE — ANESTHESIA POSTPROCEDURE EVALUATION
Anesthesia Post Evaluation    Patient: Ximena Robbins    Procedure(s) Performed: * No procedures listed *    Final Anesthesia Type: epidural    Patient location during evaluation: floor  Patient participation: Yes- Able to Participate  Level of consciousness: awake and alert and oriented  Post-procedure vital signs: reviewed and stable  Pain management: adequate  Airway patency: patent    PONV status at discharge: No PONV  Anesthetic complications: no      Cardiovascular status: blood pressure returned to baseline and hemodynamically stable  Respiratory status: unassisted, spontaneous ventilation and room air  Hydration status: euvolemic  Follow-up not needed.          Vitals Value Taken Time   /61 09/26/20 1608   Temp 36.6 °C (97.9 °F) 09/26/20 1608   Pulse 65 09/26/20 1608   Resp 18 09/26/20 1608   SpO2 98 % 09/26/20 1608         No case tracking events are documented in the log.      Pain/Marilee Score: Pain Rating Prior to Med Admin: 4 (9/26/2020  8:02 PM)  Pain Rating Post Med Admin: 4 (9/26/2020  3:30 PM)

## 2020-09-27 NOTE — DISCHARGE SUMMARY
Delivery Discharge Summary  Obstetrics      Primary OB Clinician: Lisandra Kaufman MD      Admission date: 2020  Discharge date: 2020    Disposition: To home, self care    Discharge Diagnosis List:      Patient Active Problem List   Diagnosis    Threatened miscarriage    Normal pregnancy in second trimester    Consanguinity    History of fetal abnormality in previous pregnancy, currently pregnant    History of  delivery    Rh negative state in antepartum period    Language barrier affecting health care    Mild intermittent asthma without complication    Family history of congenital heart disease, first child    Supervision of other high risk pregnancies, third trimester     (vaginal birth after )    Normal labor       Procedure: [facility] Course:  Ximena Robbins is a 30 y.o. now , PPD #2 who was admitted on 2020 at 38w4d in labor for TOLAC. Patient was subsequently admitted to labor and delivery unit with signed consents.     Labor course was complicated by non reassuring fetal status during pushing and resulted in vacuum assisted .     Please see delivery note for further details. Her postpartum course was uncomplicated. On discharge day, patient's pain is controlled with oral pain medications. Pt is tolerating ambulation without SOB or CP, and regular diet without N/V. Reports lochia is mild. Denies any HA, vision changes, F/C, LE swelling. Denies any breast pain/soreness.    Pt in stable condition and ready for discharge. She has been instructed to start and/or continue medications and follow up with her obstetrics provider as listed below.    Pertinent studies:  CBC  Recent Labs   Lab 20  0827 20  0447   WBC 10.20 12.91*   HGB 10.9* 11.1*   HCT 33.4* 34.7*   MCV 87 87    268          Immunization History   Administered Date(s) Administered    Influenza - Quadrivalent - PF *Preferred* (6 months and older) 11/15/2018,  03/04/2020    Rho (D) Immune Globulin 11/27/2018, 07/15/2020    Rho (D) Immune Globulin - IM 08/03/2018, 11/30/2018, 03/17/2020    Tdap 11/27/2018, 07/15/2020        Delivery:    Episiotomy: None   Lacerations: None   Repair suture:     Repair # of packets: 2   Blood loss (ml):       Birth information:  YOB: 2020   Time of birth: 5:41 PM   Sex: male   Delivery type: Vaginal, Vacuum (Extractor)   Gestational Age: 38w4d    Delivery Clinician:      Other providers:       Additional  information:  Forceps:    Vacuum:    Breech:    Observed anomalies      Living?:           APGARS  One minute Five minutes Ten minutes   Skin color:         Heart rate:         Grimace:         Muscle tone:         Breathing:         Totals: 8  9        Placenta: Delivered:       appearance      Patient Instructions:   Current Discharge Medication List      START taking these medications    Details   docusate sodium (COLACE) 100 MG capsule Take 2 capsules (200 mg total) by mouth 2 (two) times daily as needed for Constipation.  Qty: 60 capsule, Refills: 2      HYDROcodone-acetaminophen (NORCO) 5-325 mg per tablet Take 1 tablet by mouth every 6 (six) hours as needed.  Qty: 15 tablet, Refills: 0    Comments: Quantity prescribed more than 7 day supply? No      ibuprofen (ADVIL,MOTRIN) 600 MG tablet Take 1 tablet (600 mg total) by mouth 3 (three) times daily.  Qty: 60 tablet, Refills: 2         CONTINUE these medications which have NOT CHANGED    Details   prenatal vit calc,iron,folic (PRENATAL VITAMIN ORAL) Take by mouth.             Discharge Procedure Orders   Diet Adult Regular     Pelvic Rest   Order Comments: Nothing in vagina for 6 weeks     Lifting restrictions   Order Comments: Nothing heavier than baby for 6 weeks     No driving until:   Order Comments: Until not taking narcotics     Notify your health care provider if you experience any of the following:  temperature >100.4     Notify your health care provider if you  experience any of the following:  persistent nausea and vomiting or diarrhea     Notify your health care provider if you experience any of the following:  severe uncontrolled pain     Notify your health care provider if you experience any of the following:  redness, tenderness, or signs of infection (pain, swelling, redness, odor or green/yellow discharge around incision site)     Notify your health care provider if you experience any of the following:  difficulty breathing or increased cough     Notify your health care provider if you experience any of the following:  severe persistent headache     Notify your health care provider if you experience any of the following:  persistent dizziness, light-headedness, or visual disturbances     Notify your health care provider if you experience any of the following:  increased confusion or weakness     Notify your health care provider if you experience any of the following:   Order Comments: Heavy vaginal bleeding     Pelvic Rest   Order Comments: Pelvic rest for at least 6 weeks. Nothing in vagina - no sex, tampons, or douching for 6 weeks     Notify your health care provider if you experience any of the following:   Order Comments: Heavy vaginal bleeding saturating more than 1 pad per hour for at least 2 hours.     Notify your health care provider if you experience any of the following:  increased confusion or weakness     Notify your health care provider if you experience any of the following:  persistent dizziness, light-headedness, or visual disturbances     Notify your health care provider if you experience any of the following:  severe persistent headache     Notify your health care provider if you experience any of the following:  difficulty breathing or increased cough     Notify your health care provider if you experience any of the following:  severe uncontrolled pain     Notify your health care provider if you experience any of the following:  persistent nausea  and vomiting or diarrhea     Notify your health care provider if you experience any of the following:  temperature >100.4     Activity as tolerated       Follow-up Information     Lisandra Kaufman MD In 6 weeks.    Specialty: Obstetrics and Gynecology  Why: Postpartum follow up  Contact information:  [address] [facility] [id number] [phone]                    Grazyna Gross MD  OBGYN PGY-3

## 2020-09-28 LAB — RPR SER QL: NORMAL

## 2020-09-29 ENCOUNTER — ROUTINE PRENATAL (OUTPATIENT)
Dept: OBSTETRICS AND GYNECOLOGY | Facility: CLINIC | Age: 30
End: 2020-09-29
Payer: COMMERCIAL

## 2020-09-29 VITALS — DIASTOLIC BLOOD PRESSURE: 58 MMHG | SYSTOLIC BLOOD PRESSURE: 98 MMHG

## 2020-09-29 DIAGNOSIS — R50.9 FEVER, UNKNOWN ORIGIN: ICD-10-CM

## 2020-09-29 PROCEDURE — 99213 OFFICE O/P EST LOW 20 MIN: CPT | Mod: 24,S$GLB,, | Performed by: OBSTETRICS & GYNECOLOGY

## 2020-09-29 PROCEDURE — 99999 PR PBB SHADOW E&M-EST. PATIENT-LVL II: CPT | Mod: PBBFAC,,, | Performed by: OBSTETRICS & GYNECOLOGY

## 2020-09-29 PROCEDURE — 99999 PR PBB SHADOW E&M-EST. PATIENT-LVL II: ICD-10-PCS | Mod: PBBFAC,,, | Performed by: OBSTETRICS & GYNECOLOGY

## 2020-09-29 PROCEDURE — 99213 PR OFFICE/OUTPT VISIT, EST, LEVL III, 20-29 MIN: ICD-10-PCS | Mod: 24,S$GLB,, | Performed by: OBSTETRICS & GYNECOLOGY

## 2020-09-29 NOTE — PROGRESS NOTES
CC: Post-partum follow-up    HPI:  Ximena Robbins is a 30 y.o. female  presents for post-partum visit s/p a . Reports fever/chills yesterday - temp 101. Feeling fine today. Milk did come in. No complaints.     Delivery Date: 20  Delivery MD: Mandeep  Gender: Male  Birth Weight: 2400 g  Breast Feeding: YES  Depression: NO  Contraception: pill    ROS:  GENERAL: No fever, chills, fatigability or weight loss.  VULVAR: No pain, no lesions and no itching.  VAGINAL: No relaxation, no itching, no discharge, no abnormal bleeding and no lesions.  ABDOMEN: No abdominal pain. Denies nausea. Denies vomiting. No diarrhea. No constipation  BREAST: Denies pain. No lumps. No discharge.  URINARY: No incontinence, no nocturia, no frequency and no dysuria.  CARDIOVASCULAR: No chest pain. No shortness of breath. No leg cramps.  NEUROLOGICAL: No headaches. No vision changes.    PHYSICAL EXAM:  BP (!) 98/58   LMP 2019 Temp 97.3  GENERAL: WDWN NAD  BREAST: no erythema or streaking  ABDOMEN: incision healing well, no erythema, induration or drainage  PELVIC: deferred      Diagnosis:  1. Postpartum care and examination    2. Fever, unknown origin        Plan:   Afebrile today  Urine dip neg  Exam benign  Possibly due to breast engorgement  Precautions reviewed         Patient was counseled today on A.C.S. Pap guidelines and recommendations for yearly pelvic exams and monthly self breast exams; to see her PCP for other health maintenance.    FOLLOW UP: in 5 weeks for pp visit.

## 2020-10-07 ENCOUNTER — PATIENT MESSAGE (OUTPATIENT)
Dept: OBSTETRICS AND GYNECOLOGY | Facility: CLINIC | Age: 30
End: 2020-10-07

## 2020-10-16 ENCOUNTER — PATIENT MESSAGE (OUTPATIENT)
Dept: ADMINISTRATIVE | Facility: OTHER | Age: 30
End: 2020-10-16

## 2020-11-10 ENCOUNTER — POSTPARTUM VISIT (OUTPATIENT)
Dept: OBSTETRICS AND GYNECOLOGY | Facility: CLINIC | Age: 30
End: 2020-11-10
Payer: COMMERCIAL

## 2020-11-10 VITALS
SYSTOLIC BLOOD PRESSURE: 100 MMHG | DIASTOLIC BLOOD PRESSURE: 68 MMHG | BODY MASS INDEX: 21.21 KG/M2 | WEIGHT: 119.69 LBS

## 2020-11-10 DIAGNOSIS — Z30.9 ENCOUNTER FOR CONTRACEPTIVE MANAGEMENT, UNSPECIFIED TYPE: Primary | ICD-10-CM

## 2020-11-10 LAB
B-HCG UR QL: NEGATIVE
CTP QC/QA: YES

## 2020-11-10 PROCEDURE — 99999 PR PBB SHADOW E&M-EST. PATIENT-LVL III: ICD-10-PCS | Mod: PBBFAC,,, | Performed by: OBSTETRICS & GYNECOLOGY

## 2020-11-10 PROCEDURE — 0503F POSTPARTUM CARE VISIT: CPT | Mod: S$GLB,,, | Performed by: OBSTETRICS & GYNECOLOGY

## 2020-11-10 PROCEDURE — 0503F PR POSTPARTUM CARE VISIT: ICD-10-PCS | Mod: S$GLB,,, | Performed by: OBSTETRICS & GYNECOLOGY

## 2020-11-10 PROCEDURE — 99999 PR PBB SHADOW E&M-EST. PATIENT-LVL III: CPT | Mod: PBBFAC,,, | Performed by: OBSTETRICS & GYNECOLOGY

## 2020-11-10 RX ORDER — NORETHINDRONE ACETATE AND ETHINYL ESTRADIOL .02; 1 MG/1; MG/1
1 TABLET ORAL DAILY
Qty: 90 TABLET | Refills: 3 | Status: SHIPPED | OUTPATIENT
Start: 2020-11-10 | End: 2020-12-30

## 2020-11-10 NOTE — PROGRESS NOTES
CC: Post-partum follow-up    HPI:  Ximena Robbins is a 30 y.o. female  presents for post-partum visit s/p a . Doing well. She had a few days of bleeding last week. She normally has regular cycles lasting 8 days.     Delivery Date: 20  Delivery MD: Mandeep  Gender: Male  Birth Weight: 2400 g  Breast Feeding: No  Depression: No  Contraception: pill    ROS:  GENERAL: No fever, chills, fatigability or weight loss.  VULVAR: No pain, no lesions and no itching.  VAGINAL: No relaxation, no itching, no discharge, no abnormal bleeding and no lesions.  ABDOMEN: No abdominal pain. Denies nausea. Denies vomiting. No diarrhea. No constipation  BREAST: Denies pain. No lumps. No discharge.  URINARY: No incontinence, no nocturia, no frequency and no dysuria.  CARDIOVASCULAR: No chest pain. No shortness of breath. No leg cramps.  NEUROLOGICAL: No headaches. No vision changes.    PHYSICAL EXAM:  /68   Wt 54.3 kg (119 lb 11.4 oz)   BMI 21.21 kg/m² Temp 97.3  GENERAL: WDWN NAD  PELVIC: Normal external female genitalia; vagina rugated, normal, lacerations healing well; cervix normal, no masses; uterus small mobile nontender    Diagnosis:  1. Encounter for contraceptive management, unspecified type        Plan:   Doing well postpartum  Pap up to date  Contraception - COCs. UPT neg.     Orders Placed This Encounter    POCT Urine Pregnancy    norethindrone-ethinyl estradiol (MICROGESTIN ) 1-20 mg-mcg per tablet       Patient was counseled today on A.C.S. Pap guidelines and recommendations for yearly pelvic exams and monthly self breast exams; to see her PCP for other health maintenance.    FOLLOW UP: in 5 weeks for pp visit.

## 2020-12-28 PROBLEM — Z37.9 NORMAL LABOR: Status: RESOLVED | Noted: 2020-09-25 | Resolved: 2020-12-28

## 2020-12-30 ENCOUNTER — PATIENT MESSAGE (OUTPATIENT)
Dept: OBSTETRICS AND GYNECOLOGY | Facility: CLINIC | Age: 30
End: 2020-12-30

## 2020-12-30 DIAGNOSIS — Z30.41 ENCOUNTER FOR SURVEILLANCE OF CONTRACEPTIVE PILLS: Primary | ICD-10-CM

## 2020-12-30 RX ORDER — ACETAMINOPHEN AND CODEINE PHOSPHATE 120; 12 MG/5ML; MG/5ML
1 SOLUTION ORAL DAILY
Qty: 30 TABLET | Refills: 11 | Status: SHIPPED | OUTPATIENT
Start: 2020-12-30 | End: 2021-10-25 | Stop reason: SDUPTHER

## 2021-03-08 PROBLEM — O09.299 HISTORY OF FETAL ABNORMALITY IN PREVIOUS PREGNANCY, CURRENTLY PREGNANT: Status: RESOLVED | Noted: 2020-04-01 | Resolved: 2021-03-08

## 2021-03-31 ENCOUNTER — HOSPITAL ENCOUNTER (OUTPATIENT)
Dept: RADIOLOGY | Facility: OTHER | Age: 31
Discharge: HOME | End: 2021-03-31
Attending: OBSTETRICS & GYNECOLOGY
Payer: COMMERCIAL

## 2021-03-31 ENCOUNTER — OFFICE VISIT (OUTPATIENT)
Dept: OBSTETRICS AND GYNECOLOGY | Facility: CLINIC | Age: 31
End: 2021-03-31
Payer: COMMERCIAL

## 2021-03-31 VITALS
BODY MASS INDEX: 22.38 KG/M2 | DIASTOLIC BLOOD PRESSURE: 58 MMHG | WEIGHT: 126.31 LBS | HEIGHT: 63 IN | SYSTOLIC BLOOD PRESSURE: 104 MMHG

## 2021-03-31 DIAGNOSIS — M54.50 CHRONIC BILATERAL LOW BACK PAIN WITHOUT SCIATICA: ICD-10-CM

## 2021-03-31 DIAGNOSIS — R10.2 PELVIC PAIN: Primary | ICD-10-CM

## 2021-03-31 DIAGNOSIS — G89.29 CHRONIC BILATERAL LOW BACK PAIN WITHOUT SCIATICA: ICD-10-CM

## 2021-03-31 DIAGNOSIS — R10.2 PELVIC PAIN: ICD-10-CM

## 2021-03-31 PROCEDURE — 99213 PR OFFICE/OUTPT VISIT, EST, LEVL III, 20-29 MIN: ICD-10-PCS | Mod: S$GLB,,, | Performed by: OBSTETRICS & GYNECOLOGY

## 2021-03-31 PROCEDURE — 99999 PR PBB SHADOW E&M-EST. PATIENT-LVL III: ICD-10-PCS | Mod: PBBFAC,,, | Performed by: OBSTETRICS & GYNECOLOGY

## 2021-03-31 PROCEDURE — 76856 US EXAM PELVIC COMPLETE: CPT | Mod: TC

## 2021-03-31 PROCEDURE — 76830 US PELVIS COMP WITH TRANSVAG NON-OB (XPD): ICD-10-PCS | Mod: 26,,, | Performed by: RADIOLOGY

## 2021-03-31 PROCEDURE — 76830 TRANSVAGINAL US NON-OB: CPT | Mod: 26,,, | Performed by: RADIOLOGY

## 2021-03-31 PROCEDURE — 99999 PR PBB SHADOW E&M-EST. PATIENT-LVL III: CPT | Mod: PBBFAC,,, | Performed by: OBSTETRICS & GYNECOLOGY

## 2021-03-31 PROCEDURE — 99213 OFFICE O/P EST LOW 20 MIN: CPT | Mod: S$GLB,,, | Performed by: OBSTETRICS & GYNECOLOGY

## 2021-03-31 PROCEDURE — 76856 US EXAM PELVIC COMPLETE: CPT | Mod: 26,,, | Performed by: RADIOLOGY

## 2021-03-31 PROCEDURE — 76856 US PELVIS COMP WITH TRANSVAG NON-OB (XPD): ICD-10-PCS | Mod: 26,,, | Performed by: RADIOLOGY

## 2021-03-31 PROCEDURE — 1126F PR PAIN SEVERITY QUANTIFIED, NO PAIN PRESENT: ICD-10-PCS | Mod: S$GLB,,, | Performed by: OBSTETRICS & GYNECOLOGY

## 2021-03-31 PROCEDURE — 3008F BODY MASS INDEX DOCD: CPT | Mod: CPTII,S$GLB,, | Performed by: OBSTETRICS & GYNECOLOGY

## 2021-03-31 PROCEDURE — 1126F AMNT PAIN NOTED NONE PRSNT: CPT | Mod: S$GLB,,, | Performed by: OBSTETRICS & GYNECOLOGY

## 2021-03-31 PROCEDURE — 3008F PR BODY MASS INDEX (BMI) DOCUMENTED: ICD-10-PCS | Mod: CPTII,S$GLB,, | Performed by: OBSTETRICS & GYNECOLOGY

## 2021-04-05 ENCOUNTER — PATIENT MESSAGE (OUTPATIENT)
Dept: OBSTETRICS AND GYNECOLOGY | Facility: CLINIC | Age: 31
End: 2021-04-05

## 2021-04-05 DIAGNOSIS — R79.89 LOW VITAMIN D LEVEL: Primary | ICD-10-CM

## 2021-04-05 RX ORDER — ERGOCALCIFEROL 1.25 MG/1
50000 CAPSULE ORAL
Qty: 8 CAPSULE | Refills: 0 | Status: SHIPPED | OUTPATIENT
Start: 2021-04-05 | End: 2021-05-05

## 2021-04-13 ENCOUNTER — PATIENT MESSAGE (OUTPATIENT)
Dept: RESEARCH | Facility: HOSPITAL | Age: 31
End: 2021-04-13

## 2021-05-06 ENCOUNTER — OFFICE VISIT (OUTPATIENT)
Dept: URGENT CARE | Facility: CLINIC | Age: 31
End: 2021-05-06
Payer: COMMERCIAL

## 2021-05-06 VITALS
DIASTOLIC BLOOD PRESSURE: 67 MMHG | RESPIRATION RATE: 17 BRPM | TEMPERATURE: 99 F | BODY MASS INDEX: 21.48 KG/M2 | OXYGEN SATURATION: 99 % | HEIGHT: 63 IN | SYSTOLIC BLOOD PRESSURE: 98 MMHG | HEART RATE: 69 BPM | WEIGHT: 121.25 LBS

## 2021-05-06 DIAGNOSIS — N30.01 ACUTE CYSTITIS WITH HEMATURIA: Primary | ICD-10-CM

## 2021-05-06 LAB
B-HCG UR QL: NEGATIVE
BILIRUB UR QL STRIP: POSITIVE
CTP QC/QA: YES
GLUCOSE UR QL STRIP: NEGATIVE
KETONES UR QL STRIP: NEGATIVE
LEUKOCYTE ESTERASE UR QL STRIP: NEGATIVE
PH, POC UA: 5.5 (ref 5–8)
POC BLOOD, URINE: POSITIVE
POC NITRATES, URINE: NEGATIVE
PROT UR QL STRIP: NEGATIVE
SP GR UR STRIP: 1.01 (ref 1–1.03)
UROBILINOGEN UR STRIP-ACNC: ABNORMAL (ref 0.1–1.1)

## 2021-05-06 PROCEDURE — 81003 URINALYSIS AUTO W/O SCOPE: CPT | Mod: QW,S$GLB,, | Performed by: STUDENT IN AN ORGANIZED HEALTH CARE EDUCATION/TRAINING PROGRAM

## 2021-05-06 PROCEDURE — 81003 POCT URINALYSIS, DIPSTICK, AUTOMATED, W/O SCOPE: ICD-10-PCS | Mod: QW,S$GLB,, | Performed by: STUDENT IN AN ORGANIZED HEALTH CARE EDUCATION/TRAINING PROGRAM

## 2021-05-06 PROCEDURE — 3008F BODY MASS INDEX DOCD: CPT | Mod: CPTII,S$GLB,, | Performed by: STUDENT IN AN ORGANIZED HEALTH CARE EDUCATION/TRAINING PROGRAM

## 2021-05-06 PROCEDURE — 99214 PR OFFICE/OUTPT VISIT, EST, LEVL IV, 30-39 MIN: ICD-10-PCS | Mod: 25,S$GLB,, | Performed by: STUDENT IN AN ORGANIZED HEALTH CARE EDUCATION/TRAINING PROGRAM

## 2021-05-06 PROCEDURE — 81025 POCT URINE PREGNANCY: ICD-10-PCS | Mod: S$GLB,,, | Performed by: STUDENT IN AN ORGANIZED HEALTH CARE EDUCATION/TRAINING PROGRAM

## 2021-05-06 PROCEDURE — 3008F PR BODY MASS INDEX (BMI) DOCUMENTED: ICD-10-PCS | Mod: CPTII,S$GLB,, | Performed by: STUDENT IN AN ORGANIZED HEALTH CARE EDUCATION/TRAINING PROGRAM

## 2021-05-06 PROCEDURE — 99214 OFFICE O/P EST MOD 30 MIN: CPT | Mod: 25,S$GLB,, | Performed by: STUDENT IN AN ORGANIZED HEALTH CARE EDUCATION/TRAINING PROGRAM

## 2021-05-06 PROCEDURE — 81025 URINE PREGNANCY TEST: CPT | Mod: S$GLB,,, | Performed by: STUDENT IN AN ORGANIZED HEALTH CARE EDUCATION/TRAINING PROGRAM

## 2021-05-06 RX ORDER — SULFAMETHOXAZOLE AND TRIMETHOPRIM 800; 160 MG/1; MG/1
1 TABLET ORAL EVERY 12 HOURS
Qty: 6 TABLET | Refills: 0 | Status: SHIPPED | OUTPATIENT
Start: 2021-05-06 | End: 2021-05-09

## 2021-05-19 ENCOUNTER — IMMUNIZATION (OUTPATIENT)
Dept: PRIMARY CARE CLINIC | Facility: CLINIC | Age: 31
End: 2021-05-19
Payer: COMMERCIAL

## 2021-05-19 DIAGNOSIS — Z23 NEED FOR VACCINATION: Primary | ICD-10-CM

## 2021-05-19 PROCEDURE — 91300 COVID-19, MRNA, LNP-S, PF, 30 MCG/0.3 ML DOSE VACCINE: CPT | Mod: PBBFAC | Performed by: INTERNAL MEDICINE

## 2021-05-19 NOTE — PROGRESS NOTES
09/25/20 1114   [facility] Angelique Note - Strip   Strip Reviewed by Angel[facility] Nurse? Yes   [facility] Angelique Note - Communication   [place] Nurse Communicated with Bedside Nurse Regarding: Fetal Status   [facility] Angelique Note - Notification   Nurse Notified? Yes  (Prolonged decel. RN at bedside per Anne Alanis)   Name of Nurse Anne GLOVER      [email or web address]

## 2021-06-09 ENCOUNTER — IMMUNIZATION (OUTPATIENT)
Dept: PRIMARY CARE CLINIC | Facility: CLINIC | Age: 31
End: 2021-06-09
Payer: COMMERCIAL

## 2021-06-09 DIAGNOSIS — Z23 NEED FOR VACCINATION: Primary | ICD-10-CM

## 2021-06-09 PROCEDURE — 91300 COVID-19, MRNA, LNP-S, PF, 30 MCG/0.3 ML DOSE VACCINE: CPT | Mod: S$GLB,,, | Performed by: INTERNAL MEDICINE

## 2021-06-09 PROCEDURE — 91300 COVID-19, MRNA, LNP-S, PF, 30 MCG/0.3 ML DOSE VACCINE: ICD-10-PCS | Mod: S$GLB,,, | Performed by: INTERNAL MEDICINE

## 2021-06-09 PROCEDURE — 0002A COVID-19, MRNA, LNP-S, PF, 30 MCG/0.3 ML DOSE VACCINE: ICD-10-PCS | Mod: CV19,S$GLB,, | Performed by: INTERNAL MEDICINE

## 2021-06-09 PROCEDURE — 0002A COVID-19, MRNA, LNP-S, PF, 30 MCG/0.3 ML DOSE VACCINE: CPT | Mod: CV19,S$GLB,, | Performed by: INTERNAL MEDICINE

## 2021-10-25 ENCOUNTER — PATIENT MESSAGE (OUTPATIENT)
Dept: OBSTETRICS AND GYNECOLOGY | Facility: CLINIC | Age: 31
End: 2021-10-25
Payer: COMMERCIAL

## 2021-10-25 DIAGNOSIS — Z30.41 ENCOUNTER FOR SURVEILLANCE OF CONTRACEPTIVE PILLS: ICD-10-CM

## 2021-10-26 RX ORDER — ACETAMINOPHEN AND CODEINE PHOSPHATE 120; 12 MG/5ML; MG/5ML
1 SOLUTION ORAL DAILY
Qty: 30 TABLET | Refills: 1 | Status: SHIPPED | OUTPATIENT
Start: 2021-10-26 | End: 2021-11-23

## 2022-06-12 DIAGNOSIS — Z30.41 ENCOUNTER FOR SURVEILLANCE OF CONTRACEPTIVE PILLS: ICD-10-CM

## 2022-06-13 RX ORDER — NORETHINDRONE 0.35 MG/1
TABLET ORAL
Qty: 84 TABLET | Refills: 0 | Status: SHIPPED | OUTPATIENT
Start: 2022-06-13

## 2024-05-01 NOTE — TELEPHONE ENCOUNTER
----- Message from Shahla Rivas sent at 5/1/2024 11:06 AM CDT -----  Regarding: reschedule MRI  Pt is calling to get MRI reschedule.Call back # [phone]   Fetal echo scheduled 11/14 at 8 in the pediatric clinic.  Offered pm appt 11/12, but would not work with 's schedule.  He is aware of clinic location.